# Patient Record
Sex: FEMALE | Race: WHITE | NOT HISPANIC OR LATINO | Employment: OTHER | URBAN - METROPOLITAN AREA
[De-identification: names, ages, dates, MRNs, and addresses within clinical notes are randomized per-mention and may not be internally consistent; named-entity substitution may affect disease eponyms.]

---

## 2023-02-23 VITALS
TEMPERATURE: 98.8 F | RESPIRATION RATE: 16 BRPM | SYSTOLIC BLOOD PRESSURE: 199 MMHG | DIASTOLIC BLOOD PRESSURE: 80 MMHG | OXYGEN SATURATION: 96 % | HEART RATE: 64 BPM

## 2023-05-12 ENCOUNTER — HOSPITAL ENCOUNTER (OUTPATIENT)
Dept: DATA CONVERSION | Facility: HOSPITAL | Age: 88
End: 2023-05-20
Attending: PHYSICAL MEDICINE & REHABILITATION
Payer: MEDICARE

## 2023-05-13 LAB
ANION GAP IN SER/PLAS: 13 MMOL/L (ref 10–20)
CALCIUM (MG/DL) IN SER/PLAS: 9 MG/DL (ref 8.6–10.3)
CARBON DIOXIDE, TOTAL (MMOL/L) IN SER/PLAS: 21 MMOL/L (ref 21–32)
CHLORIDE (MMOL/L) IN SER/PLAS: 105 MMOL/L (ref 98–107)
CREATININE (MG/DL) IN SER/PLAS: 0.74 MG/DL (ref 0.5–1.05)
ERYTHROCYTE DISTRIBUTION WIDTH (RATIO) BY AUTOMATED COUNT: 13.2 % (ref 11.5–14.5)
ERYTHROCYTE MEAN CORPUSCULAR HEMOGLOBIN CONCENTRATION (G/DL) BY AUTOMATED: 33.7 G/DL (ref 32–36)
ERYTHROCYTE MEAN CORPUSCULAR VOLUME (FL) BY AUTOMATED COUNT: 95 FL (ref 80–100)
ERYTHROCYTES (10*6/UL) IN BLOOD BY AUTOMATED COUNT: 4.04 X10E12/L (ref 4–5.2)
GFR FEMALE: 75 ML/MIN/1.73M2
GLUCOSE (MG/DL) IN SER/PLAS: 174 MG/DL (ref 74–99)
HEMATOCRIT (%) IN BLOOD BY AUTOMATED COUNT: 38.3 % (ref 36–46)
HEMOGLOBIN (G/DL) IN BLOOD: 12.9 G/DL (ref 12–16)
LEUKOCYTES (10*3/UL) IN BLOOD BY AUTOMATED COUNT: 9.9 X10E9/L (ref 4.4–11.3)
PLATELETS (10*3/UL) IN BLOOD AUTOMATED COUNT: 227 X10E9/L (ref 150–450)
POTASSIUM (MMOL/L) IN SER/PLAS: 4.4 MMOL/L (ref 3.5–5.3)
SODIUM (MMOL/L) IN SER/PLAS: 135 MMOL/L (ref 136–145)
UREA NITROGEN (MG/DL) IN SER/PLAS: 16 MG/DL (ref 6–23)

## 2023-05-17 LAB
ANION GAP IN SER/PLAS: 12 MMOL/L (ref 10–20)
CALCIUM (MG/DL) IN SER/PLAS: 8.8 MG/DL (ref 8.6–10.3)
CARBON DIOXIDE, TOTAL (MMOL/L) IN SER/PLAS: 24 MMOL/L (ref 21–32)
CHLORIDE (MMOL/L) IN SER/PLAS: 107 MMOL/L (ref 98–107)
CREATININE (MG/DL) IN SER/PLAS: 0.63 MG/DL (ref 0.5–1.05)
ERYTHROCYTE DISTRIBUTION WIDTH (RATIO) BY AUTOMATED COUNT: 13.2 % (ref 11.5–14.5)
ERYTHROCYTE MEAN CORPUSCULAR HEMOGLOBIN CONCENTRATION (G/DL) BY AUTOMATED: 33.7 G/DL (ref 32–36)
ERYTHROCYTE MEAN CORPUSCULAR VOLUME (FL) BY AUTOMATED COUNT: 94 FL (ref 80–100)
ERYTHROCYTES (10*6/UL) IN BLOOD BY AUTOMATED COUNT: 3.64 X10E12/L (ref 4–5.2)
GFR FEMALE: 82 ML/MIN/1.73M2
GLUCOSE (MG/DL) IN SER/PLAS: 221 MG/DL (ref 74–99)
HEMATOCRIT (%) IN BLOOD BY AUTOMATED COUNT: 34.1 % (ref 36–46)
HEMOGLOBIN (G/DL) IN BLOOD: 11.5 G/DL (ref 12–16)
LEUKOCYTES (10*3/UL) IN BLOOD BY AUTOMATED COUNT: 7.3 X10E9/L (ref 4.4–11.3)
PLATELETS (10*3/UL) IN BLOOD AUTOMATED COUNT: 262 X10E9/L (ref 150–450)
POTASSIUM (MMOL/L) IN SER/PLAS: 4 MMOL/L (ref 3.5–5.3)
SODIUM (MMOL/L) IN SER/PLAS: 139 MMOL/L (ref 136–145)
UREA NITROGEN (MG/DL) IN SER/PLAS: 13 MG/DL (ref 6–23)

## 2023-05-18 LAB
ANION GAP IN SER/PLAS: NORMAL
CALCIUM (MG/DL) IN SER/PLAS: NORMAL
CARBON DIOXIDE, TOTAL (MMOL/L) IN SER/PLAS: NORMAL
CHLORIDE (MMOL/L) IN SER/PLAS: NORMAL
CREATININE (MG/DL) IN SER/PLAS: NORMAL
ERYTHROCYTE DISTRIBUTION WIDTH (RATIO) BY AUTOMATED COUNT: NORMAL
ERYTHROCYTE MEAN CORPUSCULAR HEMOGLOBIN CONCENTRATION (G/DL) BY AUTOMATED: NORMAL
ERYTHROCYTE MEAN CORPUSCULAR VOLUME (FL) BY AUTOMATED COUNT: NORMAL
ERYTHROCYTES (10*6/UL) IN BLOOD BY AUTOMATED COUNT: NORMAL
GFR FEMALE: NORMAL
GFR MALE: NORMAL
GLUCOSE (MG/DL) IN SER/PLAS: NORMAL
HEMATOCRIT (%) IN BLOOD BY AUTOMATED COUNT: NORMAL
HEMOGLOBIN (G/DL) IN BLOOD: NORMAL
LEUKOCYTES (10*3/UL) IN BLOOD BY AUTOMATED COUNT: NORMAL
NRBC (PER 100 WBCS) BY AUTOMATED COUNT: NORMAL
PLATELETS (10*3/UL) IN BLOOD AUTOMATED COUNT: NORMAL
POTASSIUM (MMOL/L) IN SER/PLAS: NORMAL
SODIUM (MMOL/L) IN SER/PLAS: NORMAL
UREA NITROGEN (MG/DL) IN SER/PLAS: NORMAL

## 2023-06-24 ENCOUNTER — NURSING HOME VISIT (OUTPATIENT)
Dept: POST ACUTE CARE | Facility: EXTERNAL LOCATION | Age: 88
End: 2023-06-24
Payer: MEDICARE

## 2023-06-24 DIAGNOSIS — I63.9 ACUTE CEREBROVASCULAR ACCIDENT (MULTI): Primary | ICD-10-CM

## 2023-06-24 DIAGNOSIS — E78.2 DM TYPE 2 WITH DIABETIC MIXED HYPERLIPIDEMIA (MULTI): ICD-10-CM

## 2023-06-24 DIAGNOSIS — R42 VERTIGO: ICD-10-CM

## 2023-06-24 DIAGNOSIS — E11.49 DIABETES MELLITUS TYPE 2 WITH NEUROLOGICAL MANIFESTATIONS (MULTI): ICD-10-CM

## 2023-06-24 DIAGNOSIS — I10 DIABETES MELLITUS WITH COINCIDENT HYPERTENSION (MULTI): ICD-10-CM

## 2023-06-24 DIAGNOSIS — M54.10 RADICULOPATHY, UNSPECIFIED SPINAL REGION: ICD-10-CM

## 2023-06-24 DIAGNOSIS — F32.0 CURRENT MILD EPISODE OF MAJOR DEPRESSIVE DISORDER, UNSPECIFIED WHETHER RECURRENT (CMS-HCC): ICD-10-CM

## 2023-06-24 DIAGNOSIS — E11.69 DM TYPE 2 WITH DIABETIC MIXED HYPERLIPIDEMIA (MULTI): ICD-10-CM

## 2023-06-24 DIAGNOSIS — E11.9 DIABETES MELLITUS WITH COINCIDENT HYPERTENSION (MULTI): ICD-10-CM

## 2023-06-24 DIAGNOSIS — N18.30 STAGE 3 CHRONIC KIDNEY DISEASE, UNSPECIFIED WHETHER STAGE 3A OR 3B CKD (MULTI): ICD-10-CM

## 2023-06-24 DIAGNOSIS — E03.9 ACQUIRED HYPOTHYROIDISM: ICD-10-CM

## 2023-06-24 PROBLEM — F41.8 DEPRESSION WITH ANXIETY: Status: ACTIVE | Noted: 2023-06-24

## 2023-06-24 PROBLEM — I15.2 HYPERTENSION ASSOCIATED WITH DIABETES (MULTI): Status: ACTIVE | Noted: 2023-06-24

## 2023-06-24 PROBLEM — E04.2 MULTINODULAR GOITER: Status: ACTIVE | Noted: 2023-06-24

## 2023-06-24 PROBLEM — M10.9 GOUT: Status: ACTIVE | Noted: 2023-06-24

## 2023-06-24 PROBLEM — E11.59 HYPERTENSION ASSOCIATED WITH DIABETES (MULTI): Status: ACTIVE | Noted: 2023-06-24

## 2023-06-24 PROBLEM — M19.90 ARTHRITIS: Status: ACTIVE | Noted: 2023-06-24

## 2023-06-24 PROBLEM — E11.40 DIABETIC NEUROPATHY (MULTI): Status: ACTIVE | Noted: 2023-06-24

## 2023-06-24 PROBLEM — Z95.0 PACEMAKER: Status: ACTIVE | Noted: 2023-06-24

## 2023-06-24 PROBLEM — F41.9 ANXIETY: Status: ACTIVE | Noted: 2023-06-24

## 2023-06-24 PROBLEM — M48.062 SPINAL STENOSIS, LUMBAR REGION, WITH NEUROGENIC CLAUDICATION: Status: ACTIVE | Noted: 2023-06-24

## 2023-06-24 PROBLEM — I25.10 ATHEROSCLEROSIS OF CORONARY ARTERY: Status: ACTIVE | Noted: 2023-06-24

## 2023-06-24 PROBLEM — I44.2 CHB (COMPLETE HEART BLOCK) (MULTI): Status: ACTIVE | Noted: 2023-06-24

## 2023-06-24 PROBLEM — I44.30 AV BLOCK: Status: ACTIVE | Noted: 2023-06-24

## 2023-06-24 PROCEDURE — 99306 1ST NF CARE HIGH MDM 50: CPT | Performed by: INTERNAL MEDICINE

## 2023-06-24 NOTE — PROGRESS NOTES
Subjective   Patient ID: Toshia Méndez is a 89 y.o. female who presents for postop dizziness vertigo lower extremity weakness moderate pain decreased ADL mobility ambulatory crisis  Assessment/Plan     Problem List Items Addressed This Visit    None      HPI89 year old female admitted to [facility] on 6/15 for rehab. Had a CVA on 5/7, received TNK with a subsequent hemorrhagic conversion. Was discharged to rehab and returned home. After returning home, presented to [facility] ED on 5/27 with stroke like symptoms. Was admitted and again discharged. Again presented to [facility] ED on 6/6 with dizziness, increased LLE weakness, and SOB. Thought to be post CVA related vertigo. Also developed BLE numbness L>R, most consistent with compressive mononeuropathy or radiculopathy. Did have a MRI on day of discharge.     PMHx: Iron deficiency anemia, CVA with post hemorrhagic conversion (LLE weakness), TIA, DM II, PUD, CAD, HTN, anxiety, DDD, CKD 3a, DVT, HLD, hypothyroidism, OA, AV block due to AV ablation, EF 70-75%, goiter     PSHx: PCI with stent, CABG, lumbar surgery, miguel, tonsillectomy, D&C, pacer, cataract extraction, meniscectomy of R knee, dilatation of esophageal narrowing      Social Hx: , lives alone. Denies smoking, ETOH.  6/22: BUN 10 Cr 0.6 GFR >60 Mg 1.5 gluc 192 Hg 12.2 Hct 35.4 WBC 4.7   6/16: Na-136 Cl-103 K-4.4 Glucose-193 BUN-13 Creat-0.6 Phos-4 Mag-1.4 H&H-11.7/33.8 WBC-5.1 Platelets-213    Not on File    No current outpatient medications on file.     No current facility-administered medications for this visit.      Order Order Status Revised Last Ordered    Bisacodyl Suppository 10 MG Active 6/15/2023     MIRALAX (Polyetheylene glycol) LAXATIVE POWDER Active 6/15/2023     Tuberculin PPD Solution 5 UNIT/0.1ML Active 6/15/2023   This order is potentially duplicated by other orders on the chart. Click to view details.    Multivitamin Adult Oral Tablet (Multiple Vitamin) Active 6/16/2023      Antifungal External Powder 2 % (Miconazole Nitrate (Topical)) Active 6/15/2023     Atorvastatin Calcium Oral Tablet 80 MG (Atorvastatin Calcium) Active 6/16/2023 6/15/2023    Biotin Oral Tablet 5 MG (Biotin) Active 6/16/2023   This order is potentially duplicated by other orders on the chart. Click to view details.    Metoprolol Tartrate Oral Tablet 25 MG (Metoprolol Tartrate) Active 6/15/2023 6/15/2023  This order is potentially duplicated by other orders on the chart. Click to view details.    Losartan Potassium Oral Tablet 100 MG (Losartan Potassium) Active 6/16/2023 6/15/2023    Aspirin Oral Tablet (Aspirin) Active 6/16/2023   This order is potentially duplicated by other orders on the chart. Click to view details.    amLODIPine Besylate Oral Tablet 10 MG (Amlodipine Besylate) Active 6/16/2023 6/15/2023  This order is potentially duplicated by other orders on the chart. Click to view details.    Vitamin C Oral Tablet 500 MG (Ascorbic Acid) Active 6/16/2023     Fish Oil Oral Capsule 1200 MG (Omega-3 Fatty Acids) Active 6/16/2023     Omeprazole 20 MG Capsule delayed release Active 6/16/2023 6/15/2023  This order is potentially duplicated by other orders on the chart. Click to view details.    chlorproMAZINE HCl Oral Tablet 10 MG (Chlorpromazine HCl) Active 6/15/2023 6/15/2023    Folic Acid Oral Tablet 1 MG (Folic Acid) Active 6/16/2023     Levothyroxine Sodium Tablet 137 MCG Active 6/16/2023 6/15/2023    Nitroglycerin Tablet Sublingual 0.4 MG Active 6/15/2023 6/15/2023  This order is potentially duplicated by other orders on the chart. Click to view details.    Furosemide Tablet 20 MG Active 6/15/2023 6/15/2023  This order is potentially duplicated by other orders on the chart. Click to view details.    Milk of Magnesia Suspension 400 MG/5ML (Magnesium Hydroxide) Active 6/15/2023     Acetaminophen Tablet 325 MG Active 6/15/2023     Glucagon Emergency Active 6/15/2023   This order is potentially duplicated by other  orders on the chart. Click to view details.    Trifluoperazine HCl Oral Tablet 2 MG (Trifluoperazine HCl) Active 6/17/2023 6/16/2023    ALPRAZolam Oral Tablet 0.25 MG (Alprazolam) Active 6/17/2023 6/16/2023    HumaLOG KwikPen 100 UNIT/ML Solution pen-injector Active 6/19/2023 6/17/2023    Glimepiride Oral Tablet 4 MG (Glimepiride) Active 6/20/2023 6/20/2023  This order is potentially duplicated by other orders on the chart. Click to view details.    Lasix Oral Tablet 20 MG (Furosemide) Active 6/21/2023 6/20/2023  This order is potentially duplicated by other orders on the chart. Click to view details.    MagOx 400 Oral Tablet (Magnesium Oxide (Mg Supplement)) Active 6/22/2023 6/22/2023    Jardiance Oral Tablet 10 MG (Empagliflozin) Active 6/24/2023 6/23/2023  Objective   There were no vitals taken for this visit.  Physical Exam  Current Vitals   BP: 130/70 mmHg  6/23/2023 11:10    Temp:97.6 °F  6/23/2023 11:10  Pulse:78 bpm  6/23/2023 11:10  Weight:156.3 Lbs  6/19/2023 15:09  Resp:18 Breaths/min  6/23/2023 11:10  BS:255 mg/dL  6/24/2023 08:45  O2:97 %  6/23/2023 11:10  Pain:0  6/24/2023 08:50  Constitutional:       General: He is not in acute distress.     Appearance: Normal appearance.   HENT:   Carotid bruit  Eyes:      Extraocular Movements: Extraocular movements intact.      Conjunctiva/sclera: Conjunctivae normal.   Cardiovascular:   Heart murmur irregular  Pulmonary:     Crackle Skin:     General: Skin is warm.   Neurological: Poststroke sequela with the weakness tingling numbness upper lower extremity more on the left compared to right    Psychiatric:         Anxiety depression  Musculoskeletal arthritis    Review of Systems    Orders Only on 05/17/2023   Component Date Value Ref Range Status    WBC 05/18/2023 CANCELED   Final-Edited    nRBC 05/18/2023 CANCELED   Final-Edited    RBC 05/18/2023 CANCELED   Final-Edited    Hemoglobin 05/18/2023 CANCELED   Final-Edited    Hematocrit 05/18/2023 CANCELED    Final-Edited    MCV 05/18/2023 CANCELED   Final-Edited    MCHC 05/18/2023 CANCELED   Final-Edited    Platelets 05/18/2023 CANCELED   Final-Edited    RDW 05/18/2023 CANCELED   Final-Edited   Orders Only on 05/17/2023   Component Date Value Ref Range Status    Glucose 05/18/2023 CANCELED   Final-Edited    Sodium 05/18/2023 CANCELED   Final-Edited    Potassium 05/18/2023 CANCELED   Final-Edited    Chloride 05/18/2023 CANCELED   Final-Edited    Bicarbonate 05/18/2023 CANCELED   Final-Edited    Anion Gap 05/18/2023 CANCELED   Final-Edited    Urea Nitrogen 05/18/2023 CANCELED   Final-Edited    Creatinine 05/18/2023 CANCELED   Final-Edited    GFR Female 05/18/2023 CANCELED   Final-Edited    GFR MALE 05/18/2023 CANCELED   Final-Edited    Calcium 05/18/2023 CANCELED   Final-Edited   Orders Only on 05/17/2023   Component Date Value Ref Range Status    WBC 05/17/2023 7.3  4.4 - 11.3 x10E9/L Final    RBC 05/17/2023 3.64 (L)  4.00 - 5.20 x10E12/L Final    Hemoglobin 05/17/2023 11.5 (L)  12.0 - 16.0 g/dL Final    Hematocrit 05/17/2023 34.1 (L)  36.0 - 46.0 % Final    MCV 05/17/2023 94  80 - 100 fL Final    MCHC 05/17/2023 33.7  32.0 - 36.0 g/dL Final    Platelets 05/17/2023 262  150 - 450 x10E9/L Final    RDW 05/17/2023 13.2  11.5 - 14.5 % Final   Orders Only on 05/17/2023   Component Date Value Ref Range Status    Glucose 05/17/2023 221 (H)  74 - 99 mg/dL Final    Sodium 05/17/2023 139  136 - 145 mmol/L Final    Potassium 05/17/2023 4.0  3.5 - 5.3 mmol/L Final    Chloride 05/17/2023 107  98 - 107 mmol/L Final    Bicarbonate 05/17/2023 24  21 - 32 mmol/L Final    Anion Gap 05/17/2023 12  10 - 20 mmol/L Final    Urea Nitrogen 05/17/2023 13  6 - 23 mg/dL Final    Creatinine 05/17/2023 0.63  0.50 - 1.05 mg/dL Final    GFR Female 05/17/2023 82  >90 mL/min/1.73m2 Final    Calcium 05/17/2023 8.8  8.6 - 10.3 mg/dL Final   Orders Only on 05/12/2023   Component Date Value Ref Range Status    WBC 05/13/2023 9.9  4.4 - 11.3 x10E9/L Final     RBC 05/13/2023 4.04  4.00 - 5.20 x10E12/L Final    Hemoglobin 05/13/2023 12.9  12.0 - 16.0 g/dL Final    Hematocrit 05/13/2023 38.3  36.0 - 46.0 % Final    MCV 05/13/2023 95  80 - 100 fL Final    MCHC 05/13/2023 33.7  32.0 - 36.0 g/dL Final    Platelets 05/13/2023 227  150 - 450 x10E9/L Final    RDW 05/13/2023 13.2  11.5 - 14.5 % Final   Orders Only on 05/12/2023   Component Date Value Ref Range Status    Glucose 05/13/2023 174 (H)  74 - 99 mg/dL Final    Sodium 05/13/2023 135 (L)  136 - 145 mmol/L Final    Potassium 05/13/2023 4.4  3.5 - 5.3 mmol/L Final    Chloride 05/13/2023 105  98 - 107 mmol/L Final    Bicarbonate 05/13/2023 21  21 - 32 mmol/L Final    Anion Gap 05/13/2023 13  10 - 20 mmol/L Final    Urea Nitrogen 05/13/2023 16  6 - 23 mg/dL Final    Creatinine 05/13/2023 0.74  0.50 - 1.05 mg/dL Final    GFR Female 05/13/2023 75  >90 mL/min/1.73m2 Final    Calcium 05/13/2023 9.0  8.6 - 10.3 mg/dL Final       Radiology: Reviewed imaging in powerchart.  Electrocardiogram 12 Lead    Result Date: 6/24/2023  Sinus rhythm with 1st degree AV block Left bundle branch block Abnormal ECG When compared with ECG of 26-MAY-2023 17:39, No significant change was found Confirmed by Leo Hamlin ([id number]) on 6/24/2023 1:08:08 AM    XR foot    Result Date: 6/12/2023  Interpreted By:  ESTRELLITA HENRY MD MRN: [id number] Patient Name: ESA MCNALLY  STUDY: FOOT, 2 VIEWS;  6/12/2023 4:35 pm  INDICATION: pain .  COMPARISON: None.  ACCESSION NUMBER(S): [id number]  ORDERING CLINICIAN: BRYNN CABRERA  FINDINGS: Bony structures:  Intact. Plantar and posterior calcaneal enthesophytes are noted.  Joint spaces:  Marked hallux valgus angulation and severe hammertoe deformities. Is mild dorsal osteophytosis at the tarsus.  Soft tissues:  Unremarkable without significant edema or radiodense foreign body  Other:  None significant      No acute findings.    CT brain attack head wo IV contrast    Result Date: 6/11/2023  Interpreted By:  BOOM  MD MARILU MRN: [id number] Patient Name: ESA MCNALLY  STUDY: NR CT BRAIN ATTACK HEAD WO CONTRAST;  6/11/2023 2:48 am  INDICATION: left leg numbness .  COMPARISON: 6/8/2023  ACCESSION NUMBER(S): [id number]  ORDERING CLINICIAN: AHMET ADAMSON  TECHNIQUE: Contiguous axial images of the head were obtained without intravenous contrast.  FINDINGS: There is stable edema in the right parietal and occipital lobe compatible with patient's known recent bone infarct. There is mild petechial hemorrhage or laminar necrosis which is stable in comparison to prior examination. No hydrocephalus. No midline shift. The basal cisterns are preserved. The paranasal sinuses are clear and well pneumatized.      Stable edema in the right parietal and occipital lobe corresponding to patient's known recent infarct. Mild hyperdensity at site of infarct is also stable and compatible with petechial hemorrhage or cortical laminar necrosis.      CT head wo IV contrast    Result Date: 6/9/2023  Interpreted By:  JEANMARIE MONROE MD, PHD MRN: [id number] Patient Name: ESA MCNALLY  STUDY: CT HEAD WO CONTRAST;  6/8/2023 11:46 pm  INDICATION: follow up petechial hemorrhage .  COMPARISON: 06/08/2023 approximately 4:00 p.m.  ACCESSION NUMBER(S): [id number]  ORDERING CLINICIAN: WILY SALOMON  TECHNIQUE: Noncontrast axial CT scan of head was performed 06/08/2023 at approximately 11:45 p.m.. Angled reformats in brain and bone windows were generated. The images were reviewed in bone, brain, blood and soft tissue windows.  FINDINGS: CSF Spaces: There remains sulcal effacement in right occipital and posterior medial parietal distribution. The ventricles, other sulci and basal cisterns are within normal limits. There is no extraaxial fluid collection.  Parenchyma: There remains involving hypodensity in the right superior occipital and predominantly posteromedial parietal distribution. The grey-white differentiation is otherwise intact. There is no  greater mass effect or midline shift. There remains some subtle hyperdensity on adjacent gyri without interval progression. There is no hematoma..  Calvarium: The calvarium remains within normal limits..  Paranasal sinuses and mastoids: Visualized paranasal sinuses and mastoids are clear.      Stable exam with of all vein nonhemorrhagic infarct principally in right posteromedial parietal distribution with some extension to superior occipital. There is an adjacent sulcal effacement and no hematoma.  Interpreted within [place], [place]    Venous Duplex Ultrasound DVT    Result Date: 6/8/2023  [facility] [address] [address] [place] [address] Tel [phone] Fax [phone] Vascular Lab Report Lower Venous Duplex Ultrasound Patient Name:     ESA MCNALLY Reading Physician:   [id number] Annel Hernandez MD, RPVI Study Date:       6/8/2023             Referring Physician: TALHA OVIEDO MRN/PID:          [id number]             PCP: Accession/Order#: [id number]            CC Report to: YOB: 1929            Technologist:        Renetta Wilder RVT Gender:           F                    Technologist 2: Admission Status: Inpatient            Location Performed:  [facility] Diagnosis/ICD: M79.89-Left leg swelling; M79.89-Right leg swelling Procedure/CPT: 96093 Peripheral venous duplex scan for DVT complete-[id number] Pertinent History: HTN, CAD and CVA. CONCLUSIONS: Right Lower Venous: No evidence of acute deep vein thrombus visualized in the right lower extremity. Left Lower Venous: No evidence of acute deep vein thrombus visualized in the left lower extremity. Comparison: Compared with study from 5/8/2023, no significant change.Negative for deep venous thrombus in bilateral lower extremities. Imaging & Doppler Findings: Right                 Compressible Thrombus        Flow Distal External Iliac     Yes        None   Spontaneous/Phasic CFV                       Yes         None   Spontaneous/Phasic PFV                       Yes        None FV Proximal               Yes        None   Spontaneous/Phasic FV Mid                    Yes        None FV Distal                 Yes        None Popliteal                 Yes        None   Spontaneous/Phasic Peroneal                  Yes        None PTV                       Yes        None Left                  Compress Thrombus        Flow Distal External Iliac   Yes      None   Spontaneous/Phasic CFV                     Yes      None   Spontaneous/Phasic PFV                     Yes      None FV Proximal             Yes      None   Spontaneous/Phasic FV Mid                  Yes      None FV Distal               Yes      None Popliteal               Yes      None   Spontaneous/Phasic Peroneal                Yes      None PTV                     Yes      None [id number] Annel Hernandez MD, RPVI Electronically signed by [id number] Annel Hernandez MD, RPVI on 6/8/2023 at 5:11:09    CT brain attack head wo IV contrast    Result Date: 6/8/2023  Interpreted By:  JESSE FISHER MD MRN: [id number] Patient Name: ESA MCNALLY  STUDY: NR CT BRAIN ATTACK HEAD WO CONTRAST;  6/8/2023 3:59 pm  INDICATION: dizziness and headache .  COMPARISON: Previous exam is from 06/08/2023 at 7:49 a.m...  ACCESSION NUMBER(S): [id number]  ORDERING CLINICIAN: BRYNN CABRERA  TECHNIQUE: Routine axial images were obtained from the skull base through the vertex.  Sagittal and coronal reconstruction images were generated. Brain, subdural, and bone windows were reviewed.  FINDINGS: INTRACRANIAL: Mild prominence of ventricles and sulci. There is mild patchy hypodensity throughout the deep periventricular white matter. There is an acute hypodense infarct in the posterior-medial right parietal-occipital region. There several foci of faint petechial hemorrhage along the periphery of the infarct, not significantly changed from the most recent prior exam. There is no new acute infarct and there is  no new acute intracranial bleed. There is stable effacement of adjacent sulci and gyri. No discernible or measurable midline shift. There is slight mass effect upon the right trigone. No destructive bone lesion. No depressed skull fracture. Skullbase arterial calcifications in the carotid siphons and vertebral arteries.  EXTRACRANIAL: Visualized paranasal sinuses were clear. Visualized mastoid air cells were clear.      Grossly stable acute right PCA distribution infarct with subtle small foci peripheral petechial hemorrhage, not significantly changed.  Mild volume loss.  Mild chronic white matter ischemic disease in the deep periventricular regions.   Document Only: The critical information above was relayed directly by me by Doc Halo to BRYNN CABRERA on 6/8/2023 at 4:10 pm  .    CT head wo IV contrast    Result Date: 6/8/2023  Interpreted By:  ALFIE BOURNE MD, PHD MRN: [id number] Patient Name: ESA MCNALLY  STUDY: CT HEAD WO CONTRAST;  6/8/2023 7:57 am  INDICATION: follow up vertigo; can't have MRI 2/2 PPM .  COMPARISON: Head CT, 06/06/2023 and 05/28/2020  ACCESSION NUMBER(S): [id number]  ORDERING CLINICIAN: WILY SALOMON  TECHNIQUE: Noncontrast axial CT scan of the head was performed. Angled reformats in brain and bone windows were generated. The images were reviewed in bone, brain, blood and soft tissue windows.  FINDINGS: Loss of gray-white differentiation and decreased attenuation in the left parietal and occipital lobes with associated sulcal effacement, similar to previous. An area of cortical hyperdensity at the anterior margin of the infarct (sagittal image 41) is more conspicuous than on the prior study and consistent with petechial hemorrhage. No new intracranial hemorrhage or loss of gray-white differentiation. Unchanged effacement of the atrium and occipital horn of the right lateral ventricle. No midline shift or herniation. The basal cisterns are patent.  Nonspecific low attenuation in the  white matter is similar to previous and likely secondary to chronic small vessel ischemic disease. Ventricular size is otherwise within normal limits. Bilateral lens replacements are again noted. The calvarium is unremarkable. The visualized portions of the paranasal sinuses and mastoid air cells are clear.      Evolving right PCA territory infarct with increased conspicuity of the associated petechial hemorrhage, mass effect is unchanged.    NM lung perfusion particulate    Result Date: 6/7/2023  Interpreted By:  ISADORA SAEZ MD MRN: [id number] Patient Name: ESA MCNALLY  STUDY: PERFUSION LUNG;  6/7/2023 12:06 pm  INDICATION: SOB, elevated d dimer .  COMPARISON: Chest x-ray from 06/06/2023.  ACCESSION NUMBER(S): [id number]  ORDERING CLINICIAN: KARLI MALIN  TECHNIQUE: DIVISION OF NUCLEAR MEDICINE PERFUSION LUNG SCANS  Multiple perfusion images of the lungs were acquired after the intravenous administration of 4.4 mCi of Tc-99m macroaggregated albumin (MAA). In addition, SPECT/CT of the chest was performed.  FINDINGS: Planar perfusion images of both lungs demonstrate mild heterogeneity throughout the lung fields bilaterally. No distinct wedge-shaped subsegmental or segmental perfusion defect seen on SPECT/CT to suggest acute pulmonary embolism..      1. No distinct wedge-shaped subsegmental or segmental perfusion defect seen on SPECT/CT to suggest acute pulmonary embolism (low probability).  The interpretation above is based on modified PIOPED II and PISAPED criteria.  This study was analyzed and interpreted at [place], [place].    CT head wo IV contrast    Result Date: 6/6/2023  Interpreted By:  ARCELIA BARRON MD MRN: [id number] Patient Name: ESA MCNALLY  STUDY: CT HEAD WO CONTRAST;  6/6/2023 6:36 pm  INDICATION: recent stroke hx, weak today, sob, headache  COMPARISON: 05/28/2023  ACCESSION NUMBER(S): [id number]  ORDERING CLINICIAN: JUVENCIO HERNADEZ  TECHNIQUE: Axial noncontrast CT images  of head with coronal and sagittal reconstructed images.  FINDINGS: CT HEAD:  Evidence of right parietal infarct not measurably changed from most recent examination with evolving ischemic changes. No evidence of acute hemorrhage. No mass effect or midline shift. No overt stigmata of new infarction  Vascular calcifications are seen.  If persistent concern, consider further evaluation with MRI mastoid air cells and paranasal sinuses otherwise clear      CT HEAD: Unchanged right parietal subacute infarction. No evidence of hemorrhagic conversion. If persistent concern, further evaluation can be considered with MRI . Stable examination when compared to prior    XR chest 1 view    Result Date: 6/6/2023  Interpreted By:  HAMZAH JHA MD MRN: [id number] Patient Name: ESA MCNALLY  STUDY: CHEST 1 VIEW;  6/6/2023 5:42 pm  INDICATION: Chest Pain .  COMPARISON: Chest radiograph 05/26/2020  ACCESSION NUMBER(S): [id number]  ORDERING CLINICIAN: JUVENCIO HERNADEZ  FINDINGS:   CARDIOMEDIASTINAL SILHOUETTE: Cardiomediastinal silhouette is stable in size and configuration. Left-sided pulse generator device.  LUNGS: No consolidation, pneumothorax, or significant effusion. Similar appearing presumed chronic reticular opacities bilaterally.  ABDOMEN: No remarkable upper abdominal findings.  BONES: No acute osseous changes.      1.  No evidence of acute cardiopulmonary process.      Electrocardiogram 12 Lead    Result Date: 5/31/2023  Sinus rhythm with 1st degree AV block Rightward axis Left bundle branch block Abnormal ECG When compared with ECG of 08-MAY-2023 09:39, No significant change was found Confirmed by Elvin Abdullahi ([id number]) on 5/31/2023 9:46:28 AM    CT head wo IV contrast    Result Date: 5/28/2023  Interpreted By:  MAGY OROZCO MD MRN: [id number] Patient Name: IRVING MCNALLYUERITE  STUDY: CT HEAD WO CONTRAST;  5/28/2023 8:15 am  INDICATION: leg weakness .  COMPARISON: 05/27/2023.  ACCESSION NUMBER(S): [id number]  ORDERING  CLINICIAN: ARCELIA CAMPBELL  TECHNIQUE: Contiguous unenhanced axial images were obtained through the brain.  FINDINGS: INTRACRANIAL: There is redemonstration of an evolving subacute infarct in the right parietal lobe posteriorly near midline with wedge-shaped region of low-attenuation parenchymal edema similar to prior. No new intracranial bleed is seen. There is regional sulcal effacement. No midline shift. No extra-axial fluid collection or hydrocephalus is seen. Irregular atherosclerotic calcifications again demonstrated in the internal carotid artery and vertebral artery segments.  Bones are intact.  EXTRACRANIAL: Visualized paranasal sinuses and mastoids are clear.      Evolving subacute infarct of the right parietal lobe posteriorly near midline similar to prior. No new intracranial bleed.      CT cervical spine wo IV contrast    Result Date: 5/27/2023  Interpreted By:  JESSE FISHER MD MRN: [id number] Patient Name: ESA MCNALLY  STUDY: CT C-SPINE WO CONTRAST;  5/27/2023 9:34 am  INDICATION: S/P recent stroke with hemorrhagic conversion, now presenting w with bilateral LE weakness .  COMPARISON: None.  ACCESSION NUMBER(S): [id number]  ORDERING CLINICIAN: BENITA RESENDEZ  TECHNIQUE: Thin section axial images were obtained from the skull base down through the thoracic inlet. Sagittal and coronal reconstruction images were generated. Soft tissue, lung, and bone windows were reviewed.  FINDINGS: VERTEBRAL BODIES AND POSTERIOR ELEMENTS: There is slight disc space narrowing at C5-6. Mild-to-moderate endplate osteophytosis at C5-6. Very mild endplate spurring at C2-3, C4-5 and C6-7. Mild anterior spurring also at C7-T1 and T1-2 with disc space height preservation. There is joint space loss with spurring in the anterior superior aspect of the atlantoaxial joint. Mild calcification of the transverse ligament posterior to the dens. Slight posterior disc bulge at C5-6. Multilevel interfacet hypertrophy with spur  formation. Uncovertebral hypertrophy with spur formation bilaterally at C5-6 and C6-7 and on the left at C4-5. No cervical spine compression fracture. No posterior element fracture.  No destructive bone lesion. No listhesis.  SPINAL CANAL: No gross disc herniation.  NECK SOFT TISSUES: Within normal limits.  LUNG APICES: Mild pleural and parenchymal scarring in the posterolateral right apex. Left lung apex was clear..  SKULL BASE: Within normal limits.      Cervical spine DJD as described. No CT evidence of cervical spine fracture in this exam.    CT head wo IV contrast    Result Date: 5/27/2023  Interpreted By:  JESSE FISHER MD MRN: [id number] Patient Name: ESA MCNALLY  STUDY: CT HEAD WO CONTRAST;  5/27/2023 9:33 am  INDICATION: S/P recent stroke with hemorrhagic conversion, now presentingw with bilateral LE weakness .  COMPARISON: Previous exam is from 05/26/2023..  ACCESSION NUMBER(S): [id number]  ORDERING CLINICIAN: BENITA RESENDEZ  TECHNIQUE: Routine axial images were obtained from the skull base through the vertex.  Sagittal and coronal reconstruction images were generated. Brain, subdural, and bone windows were reviewed.  FINDINGS: INTRACRANIAL: Mild prominence of ventricles and sulci. There is mild patchy hypodensity throughout the deep periventricular white matter. There is a subacute large posterior midline right parietal infarct. There is slight further decrease in the previous hyperdense components. No significant change in size. No new infarct. No new  acute intracranial bleed.  No destructive bone lesion. No depressed skull fracture. Skullbase arterial calcifications in the carotid siphons and vertebral arteries.  EXTRACRANIAL: Visualized paranasal sinuses were clear. Visualized mastoid air cells were clear.      Large subacute posterior midline right parietal lobe infarct. Slight decrease in the mild hyperdense components, but no other change.  Mild underlying volume loss.  Mild underlying chronic  "white matter ischemic disease in the deep periventricular regions.    CT head wo IV contrast    Result Date: 5/26/2023  Interpreted By:  MAXIMILIAN MONTALVO MD MRN: [id number] Patient Name: ESA MCNALLY  STUDY: CT HEAD WO CONTRAST;  5/26/2023 9:53 pm  INDICATION: \"dizziness\" recent brain bleed in May .  COMPARISON: 05/10/2023  ACCESSION NUMBER(S): [id number]  ORDERING CLINICIAN: MIKE CARTER  TECHNIQUE: Axial noncontrast CT images of the head.  FINDINGS: BRAIN PARENCHYMA: Stable loss of gray-white differentiation and sulcal effacement in the right parieto-occipital lobe, consistent with subacute to chronic infarct. Previously seen associated hemorrhage has nearly completely resolved. Gray-white matter interfaces are otherwise preserved. No mass effect or midline shift. Mild deep and periventricular white matter hypodensities are nonspecific, but favored to represent chronic small vessel ischemic changes.  HEMORRHAGE: No acute intracranial hemorrhage. VENTRICLES and EXTRA-AXIAL SPACES: The ventricles and sulci are within normal limits in size for brain volume.  No abnormal extraaxial fluid collection. EXTRACRANIAL SOFT TISSUES: Within normal limits. PARANASAL SINUSES/MASTOIDS: The visualized paranasal sinuses and mastoid air cells are aerated. CALVARIUM: No depressed skull fracture. No destructive osseous lesion.  OTHER FINDINGS: None.      No acute intracranial hemorrhage or midline shift.  Subacute to chronic infarct in the right parieto-occipital lobe with nearly completely resolved associated hemorrhage.    XR chest 1 view    Result Date: 5/26/2023  Interpreted By:  PABLITO THORNTON MD MRN: [id number] Patient Name: ESA MCNALLY  STUDY: CHEST 1 VIEW;  5/26/2023 5:59 pm  INDICATION: Chest Pain .  COMPARISON: 05/09/2023  ACCESSION NUMBER(S): [id number]  ORDERING CLINICIAN: MIKE CARTER  FINDINGS: Stable patchy bilateral suprahilar opacities. No pleural effusion or pneumothorax. Normal heart size. " Sternotomy wires. Atherosclerosis. Cardiac device. No acute osseous abnormality.      Stable bilateral suprahilar opacities.      No family history on file.  Social History     Socioeconomic History    Marital status:      Spouse name: Not on file    Number of children: Not on file    Years of education: Not on file    Highest education level: Not on file   Occupational History    Not on file   Tobacco Use    Smoking status: Not on file    Smokeless tobacco: Not on file   Substance and Sexual Activity    Alcohol use: Not on file    Drug use: Not on file    Sexual activity: Not on file   Other Topics Concern    Not on file   Social History Narrative    Not on file     Social Determinants of Health     Financial Resource Strain: Not on file   Food Insecurity: Not on file   Transportation Needs: Not on file   Physical Activity: Not on file   Stress: Not on file   Social Connections: Not on file   Intimate Partner Violence: Not on file   Housing Stability: Not on file     Past Medical History:   Diagnosis Date    Abnormal weight loss     Weight loss    Anxiety disorder, unspecified     Anxiety    Cardiomegaly     LVH (left ventricular hypertrophy)    Difficulty in walking, not elsewhere classified     Difficulty walking    Disorder of arteries and arterioles, unspecified (CMS/HCC)     Carotid artery disease    Essential (primary) hypertension 11/07/2013    Benign essential hypertension    Hyperlipidemia, unspecified 11/07/2013    Hyperlipidemia    Other symptoms and signs involving the musculoskeletal system     Limb weakness    Personal history of other diseases of the circulatory system     Personal history of coronary atherosclerosis    Personal history of other diseases of the digestive system     History of gastroesophageal reflux (GERD)    Personal history of other diseases of the nervous system and sense organs     History of diplopia    Personal history of other diseases of the respiratory system      History of chronic bronchitis    Personal history of other endocrine, nutritional and metabolic disease     History of diabetes mellitus    Personal history of other mental and behavioral disorders     History of depression    Personal history of other specified conditions     History of diarrhea    Personal history of other venous thrombosis and embolism     History of deep venous thrombosis    Stress incontinence (female) (male)     Stress incontinence     Past Surgical History:   Procedure Laterality Date    CARDIAC SURGERY  11/19/2014    Heart Surgery    CHOLECYSTECTOMY  11/19/2014    Cholecystectomy    COLONOSCOPY  11/19/2014    Colonoscopy (Fiberoptic)    CORONARY ARTERY BYPASS GRAFT  02/11/2014    CABG    DILATION AND CURETTAGE OF UTERUS  02/11/2014    Dilation And Curettage    LUMBAR LAMINECTOMY  03/04/2015    Laminectomy Lumbar    MR HEAD ANGIO WO IV CONTRAST  2/18/2020    MR HEAD ANGIO WO IV CONTRAST 2/18/2020 [facility]    MR NECK ANGIO WO IV CONTRAST  2/18/2020    MR NECK ANGIO WO IV CONTRAST 2/18/2020 [facility]    OTHER SURGICAL HISTORY  10/26/2021    Bronchoscopy    OTHER SURGICAL HISTORY  10/26/2021    Arterial stent placement    OTHER SURGICAL HISTORY  10/26/2021    Back surgery    OTHER SURGICAL HISTORY  10/26/2021    Cataract surgery    OTHER SURGICAL HISTORY  10/26/2021    Coronary artery bypass graft    OTHER SURGICAL HISTORY  10/26/2021    Pacemaker insertion    OTHER SURGICAL HISTORY  10/26/2021    Esophagogastroduodenoscopy    OTHER SURGICAL HISTORY  02/11/2014    Previous Stent Placement    TONSILLECTOMY  11/19/2014    Tonsillectomy       Charting was completed using voice recognition technology and may include unintended errors.

## 2023-06-24 NOTE — LETTER
Patient: Toshia Méndez  : 1929    Encounter Date: 2023    Subjective  Patient ID: Toshia Méndez is a 89 y.o. female who presents for postop dizziness vertigo lower extremity weakness moderate pain decreased ADL mobility ambulatory crisis  Assessment/Plan    Problem List Items Addressed This Visit    None      HPI89 year old female admitted to [facility] on 6/15 for rehab. Had a CVA on , received TNK with a subsequent hemorrhagic conversion. Was discharged to rehab and returned home. After returning home, presented to [facility] ED on  with stroke like symptoms. Was admitted and again discharged. Again presented to [facility] ED on  with dizziness, increased LLE weakness, and SOB. Thought to be post CVA related vertigo. Also developed BLE numbness L>R, most consistent with compressive mononeuropathy or radiculopathy. Did have a MRI on day of discharge.     PMHx: Iron deficiency anemia, CVA with post hemorrhagic conversion (LLE weakness), TIA, DM II, PUD, CAD, HTN, anxiety, DDD, CKD 3a, DVT, HLD, hypothyroidism, OA, AV block due to AV ablation, EF 70-75%, goiter     PSHx: PCI with stent, CABG, lumbar surgery, miguel, tonsillectomy, D&C, pacer, cataract extraction, meniscectomy of R knee, dilatation of esophageal narrowing      Social Hx: , lives alone. Denies smoking, ETOH.  : BUN 10 Cr 0.6 GFR >60 Mg 1.5 gluc 192 Hg 12.2 Hct 35.4 WBC 4.7   : Na-136 Cl-103 K-4.4 Glucose-193 BUN-13 Creat-0.6 Phos-4 Mag-1.4 H&H-11.7/33.8 WBC-5.1 Platelets-213    Not on File    No current outpatient medications on file.     No current facility-administered medications for this visit.      Order Order Status Revised Last Ordered    Bisacodyl Suppository 10 MG Active 6/15/2023     MIRALAX (Polyetheylene glycol) LAXATIVE POWDER Active 6/15/2023     Tuberculin PPD Solution 5 UNIT/0.1ML Active 6/15/2023   This order is potentially duplicated by other orders on the chart. Click to view details.     Multivitamin Adult Oral Tablet (Multiple Vitamin) Active 6/16/2023     Antifungal External Powder 2 % (Miconazole Nitrate (Topical)) Active 6/15/2023     Atorvastatin Calcium Oral Tablet 80 MG (Atorvastatin Calcium) Active 6/16/2023 6/15/2023    Biotin Oral Tablet 5 MG (Biotin) Active 6/16/2023   This order is potentially duplicated by other orders on the chart. Click to view details.    Metoprolol Tartrate Oral Tablet 25 MG (Metoprolol Tartrate) Active 6/15/2023 6/15/2023  This order is potentially duplicated by other orders on the chart. Click to view details.    Losartan Potassium Oral Tablet 100 MG (Losartan Potassium) Active 6/16/2023 6/15/2023    Aspirin Oral Tablet (Aspirin) Active 6/16/2023   This order is potentially duplicated by other orders on the chart. Click to view details.    amLODIPine Besylate Oral Tablet 10 MG (Amlodipine Besylate) Active 6/16/2023 6/15/2023  This order is potentially duplicated by other orders on the chart. Click to view details.    Vitamin C Oral Tablet 500 MG (Ascorbic Acid) Active 6/16/2023     Fish Oil Oral Capsule 1200 MG (Omega-3 Fatty Acids) Active 6/16/2023     Omeprazole 20 MG Capsule delayed release Active 6/16/2023 6/15/2023  This order is potentially duplicated by other orders on the chart. Click to view details.    chlorproMAZINE HCl Oral Tablet 10 MG (Chlorpromazine HCl) Active 6/15/2023 6/15/2023    Folic Acid Oral Tablet 1 MG (Folic Acid) Active 6/16/2023     Levothyroxine Sodium Tablet 137 MCG Active 6/16/2023 6/15/2023    Nitroglycerin Tablet Sublingual 0.4 MG Active 6/15/2023 6/15/2023  This order is potentially duplicated by other orders on the chart. Click to view details.    Furosemide Tablet 20 MG Active 6/15/2023 6/15/2023  This order is potentially duplicated by other orders on the chart. Click to view details.    Milk of Magnesia Suspension 400 MG/5ML (Magnesium Hydroxide) Active 6/15/2023     Acetaminophen Tablet 325 MG Active 6/15/2023     Glucagon  Emergency Active 6/15/2023   This order is potentially duplicated by other orders on the chart. Click to view details.    Trifluoperazine HCl Oral Tablet 2 MG (Trifluoperazine HCl) Active 6/17/2023 6/16/2023    ALPRAZolam Oral Tablet 0.25 MG (Alprazolam) Active 6/17/2023 6/16/2023    HumaLOG KwikPen 100 UNIT/ML Solution pen-injector Active 6/19/2023 6/17/2023    Glimepiride Oral Tablet 4 MG (Glimepiride) Active 6/20/2023 6/20/2023  This order is potentially duplicated by other orders on the chart. Click to view details.    Lasix Oral Tablet 20 MG (Furosemide) Active 6/21/2023 6/20/2023  This order is potentially duplicated by other orders on the chart. Click to view details.    MagOx 400 Oral Tablet (Magnesium Oxide (Mg Supplement)) Active 6/22/2023 6/22/2023    Jardiance Oral Tablet 10 MG (Empagliflozin) Active 6/24/2023 6/23/2023  Objective  There were no vitals taken for this visit.  Physical Exam  Current Vitals   BP: 130/70 mmHg  6/23/2023 11:10    Temp:97.6 °F  6/23/2023 11:10  Pulse:78 bpm  6/23/2023 11:10  Weight:156.3 Lbs  6/19/2023 15:09  Resp:18 Breaths/min  6/23/2023 11:10  BS:255 mg/dL  6/24/2023 08:45  O2:97 %  6/23/2023 11:10  Pain:0  6/24/2023 08:50  Constitutional:       General: He is not in acute distress.     Appearance: Normal appearance.   HENT:   Carotid bruit  Eyes:      Extraocular Movements: Extraocular movements intact.      Conjunctiva/sclera: Conjunctivae normal.   Cardiovascular:   Heart murmur irregular  Pulmonary:     Crackle Skin:     General: Skin is warm.   Neurological: Poststroke sequela with the weakness tingling numbness upper lower extremity more on the left compared to right    Psychiatric:         Anxiety depression  Musculoskeletal arthritis    Review of Systems    Orders Only on 05/17/2023   Component Date Value Ref Range Status   • WBC 05/18/2023 CANCELED   Final-Edited   • nRBC 05/18/2023 CANCELED   Final-Edited   • RBC 05/18/2023 CANCELED   Final-Edited   • Hemoglobin  05/18/2023 CANCELED   Final-Edited   • Hematocrit 05/18/2023 CANCELED   Final-Edited   • MCV 05/18/2023 CANCELED   Final-Edited   • MCHC 05/18/2023 CANCELED   Final-Edited   • Platelets 05/18/2023 CANCELED   Final-Edited   • RDW 05/18/2023 CANCELED   Final-Edited   Orders Only on 05/17/2023   Component Date Value Ref Range Status   • Glucose 05/18/2023 CANCELED   Final-Edited   • Sodium 05/18/2023 CANCELED   Final-Edited   • Potassium 05/18/2023 CANCELED   Final-Edited   • Chloride 05/18/2023 CANCELED   Final-Edited   • Bicarbonate 05/18/2023 CANCELED   Final-Edited   • Anion Gap 05/18/2023 CANCELED   Final-Edited   • Urea Nitrogen 05/18/2023 CANCELED   Final-Edited   • Creatinine 05/18/2023 CANCELED   Final-Edited   • GFR Female 05/18/2023 CANCELED   Final-Edited   • GFR MALE 05/18/2023 CANCELED   Final-Edited   • Calcium 05/18/2023 CANCELED   Final-Edited   Orders Only on 05/17/2023   Component Date Value Ref Range Status   • WBC 05/17/2023 7.3  4.4 - 11.3 x10E9/L Final   • RBC 05/17/2023 3.64 (L)  4.00 - 5.20 x10E12/L Final   • Hemoglobin 05/17/2023 11.5 (L)  12.0 - 16.0 g/dL Final   • Hematocrit 05/17/2023 34.1 (L)  36.0 - 46.0 % Final   • MCV 05/17/2023 94  80 - 100 fL Final   • MCHC 05/17/2023 33.7  32.0 - 36.0 g/dL Final   • Platelets 05/17/2023 262  150 - 450 x10E9/L Final   • RDW 05/17/2023 13.2  11.5 - 14.5 % Final   Orders Only on 05/17/2023   Component Date Value Ref Range Status   • Glucose 05/17/2023 221 (H)  74 - 99 mg/dL Final   • Sodium 05/17/2023 139  136 - 145 mmol/L Final   • Potassium 05/17/2023 4.0  3.5 - 5.3 mmol/L Final   • Chloride 05/17/2023 107  98 - 107 mmol/L Final   • Bicarbonate 05/17/2023 24  21 - 32 mmol/L Final   • Anion Gap 05/17/2023 12  10 - 20 mmol/L Final   • Urea Nitrogen 05/17/2023 13  6 - 23 mg/dL Final   • Creatinine 05/17/2023 0.63  0.50 - 1.05 mg/dL Final   • GFR Female 05/17/2023 82  >90 mL/min/1.73m2 Final   • Calcium 05/17/2023 8.8  8.6 - 10.3 mg/dL Final   Orders Only  on 05/12/2023   Component Date Value Ref Range Status   • WBC 05/13/2023 9.9  4.4 - 11.3 x10E9/L Final   • RBC 05/13/2023 4.04  4.00 - 5.20 x10E12/L Final   • Hemoglobin 05/13/2023 12.9  12.0 - 16.0 g/dL Final   • Hematocrit 05/13/2023 38.3  36.0 - 46.0 % Final   • MCV 05/13/2023 95  80 - 100 fL Final   • MCHC 05/13/2023 33.7  32.0 - 36.0 g/dL Final   • Platelets 05/13/2023 227  150 - 450 x10E9/L Final   • RDW 05/13/2023 13.2  11.5 - 14.5 % Final   Orders Only on 05/12/2023   Component Date Value Ref Range Status   • Glucose 05/13/2023 174 (H)  74 - 99 mg/dL Final   • Sodium 05/13/2023 135 (L)  136 - 145 mmol/L Final   • Potassium 05/13/2023 4.4  3.5 - 5.3 mmol/L Final   • Chloride 05/13/2023 105  98 - 107 mmol/L Final   • Bicarbonate 05/13/2023 21  21 - 32 mmol/L Final   • Anion Gap 05/13/2023 13  10 - 20 mmol/L Final   • Urea Nitrogen 05/13/2023 16  6 - 23 mg/dL Final   • Creatinine 05/13/2023 0.74  0.50 - 1.05 mg/dL Final   • GFR Female 05/13/2023 75  >90 mL/min/1.73m2 Final   • Calcium 05/13/2023 9.0  8.6 - 10.3 mg/dL Final       Radiology: Reviewed imaging in powerchart.  Electrocardiogram 12 Lead    Result Date: 6/24/2023  Sinus rhythm with 1st degree AV block Left bundle branch block Abnormal ECG When compared with ECG of 26-MAY-2023 17:39, No significant change was found Confirmed by Leo Hamlin ([id number]) on 6/24/2023 1:08:08 AM    XR foot    Result Date: 6/12/2023  Interpreted By:  ESTRELLITA HENRY MD MRN: [id number] Patient Name: ESA MCNALLY  STUDY: FOOT, 2 VIEWS;  6/12/2023 4:35 pm  INDICATION: pain .  COMPARISON: None.  ACCESSION NUMBER(S): [id number]  ORDERING CLINICIAN: BRYNN CABRERA  FINDINGS: Bony structures:  Intact. Plantar and posterior calcaneal enthesophytes are noted.  Joint spaces:  Marked hallux valgus angulation and severe hammertoe deformities. Is mild dorsal osteophytosis at the tarsus.  Soft tissues:  Unremarkable without significant edema or radiodense foreign body  Other:  None  significant      No acute findings.    CT brain attack head wo IV contrast    Result Date: 6/11/2023  Interpreted By:  MARILU NUR MD MRN: [id number] Patient Name: ESA MCNALLY  STUDY: NR CT BRAIN ATTACK HEAD WO CONTRAST;  6/11/2023 2:48 am  INDICATION: left leg numbness .  COMPARISON: 6/8/2023  ACCESSION NUMBER(S): [id number]  ORDERING CLINICIAN: AHMET ADAMSON  TECHNIQUE: Contiguous axial images of the head were obtained without intravenous contrast.  FINDINGS: There is stable edema in the right parietal and occipital lobe compatible with patient's known recent bone infarct. There is mild petechial hemorrhage or laminar necrosis which is stable in comparison to prior examination. No hydrocephalus. No midline shift. The basal cisterns are preserved. The paranasal sinuses are clear and well pneumatized.      Stable edema in the right parietal and occipital lobe corresponding to patient's known recent infarct. Mild hyperdensity at site of infarct is also stable and compatible with petechial hemorrhage or cortical laminar necrosis.      CT head wo IV contrast    Result Date: 6/9/2023  Interpreted By:  JEANMARIE MONROE MD, PHD MRN: [id number] Patient Name: ESA MCNALLY  STUDY: CT HEAD WO CONTRAST;  6/8/2023 11:46 pm  INDICATION: follow up petechial hemorrhage .  COMPARISON: 06/08/2023 approximately 4:00 p.m.  ACCESSION NUMBER(S): [id number]  ORDERING CLINICIAN: WILY SALOMON  TECHNIQUE: Noncontrast axial CT scan of head was performed 06/08/2023 at approximately 11:45 p.m.. Angled reformats in brain and bone windows were generated. The images were reviewed in bone, brain, blood and soft tissue windows.  FINDINGS: CSF Spaces: There remains sulcal effacement in right occipital and posterior medial parietal distribution. The ventricles, other sulci and basal cisterns are within normal limits. There is no extraaxial fluid collection.  Parenchyma: There remains involving hypodensity in the right superior  occipital and predominantly posteromedial parietal distribution. The grey-white differentiation is otherwise intact. There is no greater mass effect or midline shift. There remains some subtle hyperdensity on adjacent gyri without interval progression. There is no hematoma..  Calvarium: The calvarium remains within normal limits..  Paranasal sinuses and mastoids: Visualized paranasal sinuses and mastoids are clear.      Stable exam with of all vein nonhemorrhagic infarct principally in right posteromedial parietal distribution with some extension to superior occipital. There is an adjacent sulcal effacement and no hematoma.  Interpreted within [place], [place]    Venous Duplex Ultrasound DVT    Result Date: 6/8/2023  [facility] [address] [place] [address] Tel [phone] Fax [phone] Vascular Lab Report Lower Venous Duplex Ultrasound Patient Name:     ESA DOROTHY MCNALLY Reading Physician:   [id number] Annel Hernandez MD, RPVI Study Date:       6/8/2023             Referring Physician: TALHA OVIEDO MRN/PID:          [id number]             PCP: Accession/Order#: [id number]            CC Report to: YOB: 1929            Technologist:        Renetta Wilder RVT Gender:           F                    Technologist 2: Admission Status: Inpatient            Location Performed:  [facility] Diagnosis/ICD: M79.89-Left leg swelling; M79.89-Right leg swelling Procedure/CPT: 45488 Peripheral venous duplex scan for DVT complete-[id number] Pertinent History: HTN, CAD and CVA. CONCLUSIONS: Right Lower Venous: No evidence of acute deep vein thrombus visualized in the right lower extremity. Left Lower Venous: No evidence of acute deep vein thrombus visualized in the left lower extremity. Comparison: Compared with study from 5/8/2023, no significant change.Negative for deep venous thrombus in bilateral lower extremities. Imaging & Doppler Findings: Right                  Compressible Thrombus        Flow Distal External Iliac     Yes        None   Spontaneous/Phasic CFV                       Yes        None   Spontaneous/Phasic PFV                       Yes        None FV Proximal               Yes        None   Spontaneous/Phasic FV Mid                    Yes        None FV Distal                 Yes        None Popliteal                 Yes        None   Spontaneous/Phasic Peroneal                  Yes        None PTV                       Yes        None Left                  Compress Thrombus        Flow Distal External Iliac   Yes      None   Spontaneous/Phasic CFV                     Yes      None   Spontaneous/Phasic PFV                     Yes      None FV Proximal             Yes      None   Spontaneous/Phasic FV Mid                  Yes      None FV Distal               Yes      None Popliteal               Yes      None   Spontaneous/Phasic Peroneal                Yes      None PTV                     Yes      None [id number] Annel Hernandez MD, RPVI Electronically signed by [id number] Annel Hernandez MD, RPVI on 6/8/2023 at 5:11:09    CT brain attack head wo IV contrast    Result Date: 6/8/2023  Interpreted By:  JESSE FISHER MD MRN: [id number] Patient Name: ESA MCNALLY  STUDY: NR CT BRAIN ATTACK HEAD WO CONTRAST;  6/8/2023 3:59 pm  INDICATION: dizziness and headache .  COMPARISON: Previous exam is from 06/08/2023 at 7:49 a.m...  ACCESSION NUMBER(S): [id number]  ORDERING CLINICIAN: BRYNN CABRERA  TECHNIQUE: Routine axial images were obtained from the skull base through the vertex.  Sagittal and coronal reconstruction images were generated. Brain, subdural, and bone windows were reviewed.  FINDINGS: INTRACRANIAL: Mild prominence of ventricles and sulci. There is mild patchy hypodensity throughout the deep periventricular white matter. There is an acute hypodense infarct in the posterior-medial right parietal-occipital region. There several foci of faint petechial hemorrhage  along the periphery of the infarct, not significantly changed from the most recent prior exam. There is no new acute infarct and there is no new acute intracranial bleed. There is stable effacement of adjacent sulci and gyri. No discernible or measurable midline shift. There is slight mass effect upon the right trigone. No destructive bone lesion. No depressed skull fracture. Skullbase arterial calcifications in the carotid siphons and vertebral arteries.  EXTRACRANIAL: Visualized paranasal sinuses were clear. Visualized mastoid air cells were clear.      Grossly stable acute right PCA distribution infarct with subtle small foci peripheral petechial hemorrhage, not significantly changed.  Mild volume loss.  Mild chronic white matter ischemic disease in the deep periventricular regions.   Document Only: The critical information above was relayed directly by me by Doc Halo to BRYNN CABRERA on 6/8/2023 at 4:10 pm  .    CT head wo IV contrast    Result Date: 6/8/2023  Interpreted By:  ALFIE BOURNE MD, PHD MRN: [id number] Patient Name: ESA MCNALLY  STUDY: CT HEAD WO CONTRAST;  6/8/2023 7:57 am  INDICATION: follow up vertigo; can't have MRI 2/2 PPM .  COMPARISON: Head CT, 06/06/2023 and 05/28/2020  ACCESSION NUMBER(S): [id number]  ORDERING CLINICIAN: WILY SALOMON  TECHNIQUE: Noncontrast axial CT scan of the head was performed. Angled reformats in brain and bone windows were generated. The images were reviewed in bone, brain, blood and soft tissue windows.  FINDINGS: Loss of gray-white differentiation and decreased attenuation in the left parietal and occipital lobes with associated sulcal effacement, similar to previous. An area of cortical hyperdensity at the anterior margin of the infarct (sagittal image 41) is more conspicuous than on the prior study and consistent with petechial hemorrhage. No new intracranial hemorrhage or loss of gray-white differentiation. Unchanged effacement of the atrium and  occipital horn of the right lateral ventricle. No midline shift or herniation. The basal cisterns are patent.  Nonspecific low attenuation in the white matter is similar to previous and likely secondary to chronic small vessel ischemic disease. Ventricular size is otherwise within normal limits. Bilateral lens replacements are again noted. The calvarium is unremarkable. The visualized portions of the paranasal sinuses and mastoid air cells are clear.      Evolving right PCA territory infarct with increased conspicuity of the associated petechial hemorrhage, mass effect is unchanged.    NM lung perfusion particulate    Result Date: 6/7/2023  Interpreted By:  ISADORA SAEZ MD MRN: [id number] Patient Name: ESA MCNALLY  STUDY: PERFUSION LUNG;  6/7/2023 12:06 pm  INDICATION: SOB, elevated d dimer .  COMPARISON: Chest x-ray from 06/06/2023.  ACCESSION NUMBER(S): [id number]  ORDERING CLINICIAN: KARLI MALIN  TECHNIQUE: DIVISION OF NUCLEAR MEDICINE PERFUSION LUNG SCANS  Multiple perfusion images of the lungs were acquired after the intravenous administration of 4.4 mCi of Tc-99m macroaggregated albumin (MAA). In addition, SPECT/CT of the chest was performed.  FINDINGS: Planar perfusion images of both lungs demonstrate mild heterogeneity throughout the lung fields bilaterally. No distinct wedge-shaped subsegmental or segmental perfusion defect seen on SPECT/CT to suggest acute pulmonary embolism..      1. No distinct wedge-shaped subsegmental or segmental perfusion defect seen on SPECT/CT to suggest acute pulmonary embolism (low probability).  The interpretation above is based on modified PIOPED II and PISAPED criteria.  This study was analyzed and interpreted at [place], [place].    CT head wo IV contrast    Result Date: 6/6/2023  Interpreted By:  ARCELIA BARRON MD MRN: [id number] Patient Name: ESA MCNALLY  STUDY: CT HEAD WO CONTRAST;  6/6/2023 6:36 pm  INDICATION: recent stroke hx, weak  today, sob, headache  COMPARISON: 05/28/2023  ACCESSION NUMBER(S): [id number]  ORDERING CLINICIAN: JUVENCIO HERNADEZ  TECHNIQUE: Axial noncontrast CT images of head with coronal and sagittal reconstructed images.  FINDINGS: CT HEAD:  Evidence of right parietal infarct not measurably changed from most recent examination with evolving ischemic changes. No evidence of acute hemorrhage. No mass effect or midline shift. No overt stigmata of new infarction  Vascular calcifications are seen.  If persistent concern, consider further evaluation with MRI mastoid air cells and paranasal sinuses otherwise clear      CT HEAD: Unchanged right parietal subacute infarction. No evidence of hemorrhagic conversion. If persistent concern, further evaluation can be considered with MRI . Stable examination when compared to prior    XR chest 1 view    Result Date: 6/6/2023  Interpreted By:  HAMZAH JHA MD MRN: [id number] Patient Name: ESA MCNALLY  STUDY: CHEST 1 VIEW;  6/6/2023 5:42 pm  INDICATION: Chest Pain .  COMPARISON: Chest radiograph 05/26/2020  ACCESSION NUMBER(S): [id number]  ORDERING CLINICIAN: JUVENCIO HERNADEZ  FINDINGS:   CARDIOMEDIASTINAL SILHOUETTE: Cardiomediastinal silhouette is stable in size and configuration. Left-sided pulse generator device.  LUNGS: No consolidation, pneumothorax, or significant effusion. Similar appearing presumed chronic reticular opacities bilaterally.  ABDOMEN: No remarkable upper abdominal findings.  BONES: No acute osseous changes.      1.  No evidence of acute cardiopulmonary process.      Electrocardiogram 12 Lead    Result Date: 5/31/2023  Sinus rhythm with 1st degree AV block Rightward axis Left bundle branch block Abnormal ECG When compared with ECG of 08-MAY-2023 09:39, No significant change was found Confirmed by Elvin Abdullahi ([id number]) on 5/31/2023 9:46:28 AM    CT head wo IV contrast    Result Date: 5/28/2023  Interpreted By:  MAGY OROZCO MD MRN: [id number] Patient Name: DALI  ESA  STUDY: CT HEAD WO CONTRAST;  5/28/2023 8:15 am  INDICATION: leg weakness .  COMPARISON: 05/27/2023.  ACCESSION NUMBER(S): [id number]  ORDERING CLINICIAN: ARCELIA CAMPBELL  TECHNIQUE: Contiguous unenhanced axial images were obtained through the brain.  FINDINGS: INTRACRANIAL: There is redemonstration of an evolving subacute infarct in the right parietal lobe posteriorly near midline with wedge-shaped region of low-attenuation parenchymal edema similar to prior. No new intracranial bleed is seen. There is regional sulcal effacement. No midline shift. No extra-axial fluid collection or hydrocephalus is seen. Irregular atherosclerotic calcifications again demonstrated in the internal carotid artery and vertebral artery segments.  Bones are intact.  EXTRACRANIAL: Visualized paranasal sinuses and mastoids are clear.      Evolving subacute infarct of the right parietal lobe posteriorly near midline similar to prior. No new intracranial bleed.      CT cervical spine wo IV contrast    Result Date: 5/27/2023  Interpreted By:  JESSE FISHER MD MRN: [id number] Patient Name: ESA MCNALLY  STUDY: CT C-SPINE WO CONTRAST;  5/27/2023 9:34 am  INDICATION: S/P recent stroke with hemorrhagic conversion, now presenting w with bilateral LE weakness .  COMPARISON: None.  ACCESSION NUMBER(S): [id number]  ORDERING CLINICIAN: BENITA RESENDEZ  TECHNIQUE: Thin section axial images were obtained from the skull base down through the thoracic inlet. Sagittal and coronal reconstruction images were generated. Soft tissue, lung, and bone windows were reviewed.  FINDINGS: VERTEBRAL BODIES AND POSTERIOR ELEMENTS: There is slight disc space narrowing at C5-6. Mild-to-moderate endplate osteophytosis at C5-6. Very mild endplate spurring at C2-3, C4-5 and C6-7. Mild anterior spurring also at C7-T1 and T1-2 with disc space height preservation. There is joint space loss with spurring in the anterior superior aspect of the atlantoaxial joint. Mild  calcification of the transverse ligament posterior to the dens. Slight posterior disc bulge at C5-6. Multilevel interfacet hypertrophy with spur formation. Uncovertebral hypertrophy with spur formation bilaterally at C5-6 and C6-7 and on the left at C4-5. No cervical spine compression fracture. No posterior element fracture.  No destructive bone lesion. No listhesis.  SPINAL CANAL: No gross disc herniation.  NECK SOFT TISSUES: Within normal limits.  LUNG APICES: Mild pleural and parenchymal scarring in the posterolateral right apex. Left lung apex was clear..  SKULL BASE: Within normal limits.      Cervical spine DJD as described. No CT evidence of cervical spine fracture in this exam.    CT head wo IV contrast    Result Date: 5/27/2023  Interpreted By:  JESSE FISHER MD MRN: [id number] Patient Name: ESA MCNALLY  STUDY: CT HEAD WO CONTRAST;  5/27/2023 9:33 am  INDICATION: S/P recent stroke with hemorrhagic conversion, now presentingw with bilateral LE weakness .  COMPARISON: Previous exam is from 05/26/2023..  ACCESSION NUMBER(S): [id number]  ORDERING CLINICIAN: BENITA RESENDEZ  TECHNIQUE: Routine axial images were obtained from the skull base through the vertex.  Sagittal and coronal reconstruction images were generated. Brain, subdural, and bone windows were reviewed.  FINDINGS: INTRACRANIAL: Mild prominence of ventricles and sulci. There is mild patchy hypodensity throughout the deep periventricular white matter. There is a subacute large posterior midline right parietal infarct. There is slight further decrease in the previous hyperdense components. No significant change in size. No new infarct. No new  acute intracranial bleed.  No destructive bone lesion. No depressed skull fracture. Skullbase arterial calcifications in the carotid siphons and vertebral arteries.  EXTRACRANIAL: Visualized paranasal sinuses were clear. Visualized mastoid air cells were clear.      Large subacute posterior midline right  "parietal lobe infarct. Slight decrease in the mild hyperdense components, but no other change.  Mild underlying volume loss.  Mild underlying chronic white matter ischemic disease in the deep periventricular regions.    CT head wo IV contrast    Result Date: 5/26/2023  Interpreted By:  MAXIMILIAN MONTALVO MD MRN: [id number] Patient Name: ESA MCNALLY  STUDY: CT HEAD WO CONTRAST;  5/26/2023 9:53 pm  INDICATION: \"dizziness\" recent brain bleed in May .  COMPARISON: 05/10/2023  ACCESSION NUMBER(S): [id number]  ORDERING CLINICIAN: MIKE CARTER  TECHNIQUE: Axial noncontrast CT images of the head.  FINDINGS: BRAIN PARENCHYMA: Stable loss of gray-white differentiation and sulcal effacement in the right parieto-occipital lobe, consistent with subacute to chronic infarct. Previously seen associated hemorrhage has nearly completely resolved. Gray-white matter interfaces are otherwise preserved. No mass effect or midline shift. Mild deep and periventricular white matter hypodensities are nonspecific, but favored to represent chronic small vessel ischemic changes.  HEMORRHAGE: No acute intracranial hemorrhage. VENTRICLES and EXTRA-AXIAL SPACES: The ventricles and sulci are within normal limits in size for brain volume.  No abnormal extraaxial fluid collection. EXTRACRANIAL SOFT TISSUES: Within normal limits. PARANASAL SINUSES/MASTOIDS: The visualized paranasal sinuses and mastoid air cells are aerated. CALVARIUM: No depressed skull fracture. No destructive osseous lesion.  OTHER FINDINGS: None.      No acute intracranial hemorrhage or midline shift.  Subacute to chronic infarct in the right parieto-occipital lobe with nearly completely resolved associated hemorrhage.    XR chest 1 view    Result Date: 5/26/2023  Interpreted By:  PABLITO THORNTON MD MRN: [id number] Patient Name: ESA MCNALLY  STUDY: CHEST 1 VIEW;  5/26/2023 5:59 pm  INDICATION: Chest Pain .  COMPARISON: 05/09/2023  ACCESSION NUMBER(S): [id number]  " ORDERING CLINICIAN: MIKE CARTER  FINDINGS: Stable patchy bilateral suprahilar opacities. No pleural effusion or pneumothorax. Normal heart size. Sternotomy wires. Atherosclerosis. Cardiac device. No acute osseous abnormality.      Stable bilateral suprahilar opacities.      No family history on file.  Social History     Socioeconomic History   • Marital status:      Spouse name: Not on file   • Number of children: Not on file   • Years of education: Not on file   • Highest education level: Not on file   Occupational History   • Not on file   Tobacco Use   • Smoking status: Not on file   • Smokeless tobacco: Not on file   Substance and Sexual Activity   • Alcohol use: Not on file   • Drug use: Not on file   • Sexual activity: Not on file   Other Topics Concern   • Not on file   Social History Narrative   • Not on file     Social Determinants of Health     Financial Resource Strain: Not on file   Food Insecurity: Not on file   Transportation Needs: Not on file   Physical Activity: Not on file   Stress: Not on file   Social Connections: Not on file   Intimate Partner Violence: Not on file   Housing Stability: Not on file     Past Medical History:   Diagnosis Date   • Abnormal weight loss     Weight loss   • Anxiety disorder, unspecified     Anxiety   • Cardiomegaly     LVH (left ventricular hypertrophy)   • Difficulty in walking, not elsewhere classified     Difficulty walking   • Disorder of arteries and arterioles, unspecified (CMS/HCC)     Carotid artery disease   • Essential (primary) hypertension 11/07/2013    Benign essential hypertension   • Hyperlipidemia, unspecified 11/07/2013    Hyperlipidemia   • Other symptoms and signs involving the musculoskeletal system     Limb weakness   • Personal history of other diseases of the circulatory system     Personal history of coronary atherosclerosis   • Personal history of other diseases of the digestive system     History of gastroesophageal reflux  (GERD)   • Personal history of other diseases of the nervous system and sense organs     History of diplopia   • Personal history of other diseases of the respiratory system     History of chronic bronchitis   • Personal history of other endocrine, nutritional and metabolic disease     History of diabetes mellitus   • Personal history of other mental and behavioral disorders     History of depression   • Personal history of other specified conditions     History of diarrhea   • Personal history of other venous thrombosis and embolism     History of deep venous thrombosis   • Stress incontinence (female) (male)     Stress incontinence     Past Surgical History:   Procedure Laterality Date   • CARDIAC SURGERY  11/19/2014    Heart Surgery   • CHOLECYSTECTOMY  11/19/2014    Cholecystectomy   • COLONOSCOPY  11/19/2014    Colonoscopy (Fiberoptic)   • CORONARY ARTERY BYPASS GRAFT  02/11/2014    CABG   • DILATION AND CURETTAGE OF UTERUS  02/11/2014    Dilation And Curettage   • LUMBAR LAMINECTOMY  03/04/2015    Laminectomy Lumbar   • MR HEAD ANGIO WO IV CONTRAST  2/18/2020    MR HEAD ANGIO WO IV CONTRAST 2/18/2020 [facility] CLINICAL LEGACY   • MR NECK ANGIO WO IV CONTRAST  2/18/2020    MR NECK ANGIO WO IV CONTRAST 2/18/2020 [facility] CLINICAL LEGACY   • OTHER SURGICAL HISTORY  10/26/2021    Bronchoscopy   • OTHER SURGICAL HISTORY  10/26/2021    Arterial stent placement   • OTHER SURGICAL HISTORY  10/26/2021    Back surgery   • OTHER SURGICAL HISTORY  10/26/2021    Cataract surgery   • OTHER SURGICAL HISTORY  10/26/2021    Coronary artery bypass graft   • OTHER SURGICAL HISTORY  10/26/2021    Pacemaker insertion   • OTHER SURGICAL HISTORY  10/26/2021    Esophagogastroduodenoscopy   • OTHER SURGICAL HISTORY  02/11/2014    Previous Stent Placement   • TONSILLECTOMY  11/19/2014    Tonsillectomy       Charting was completed using voice recognition technology and may include unintended errors.           Electronically Signed By: Pretty ARIZA  MD Gabriela   6/24/23  9:39 AM

## 2023-11-23 ENCOUNTER — APPOINTMENT (OUTPATIENT)
Dept: RADIOLOGY | Facility: HOSPITAL | Age: 88
End: 2023-11-23
Payer: MEDICARE

## 2023-11-23 ENCOUNTER — HOSPITAL ENCOUNTER (OUTPATIENT)
Facility: HOSPITAL | Age: 88
Setting detail: OBSERVATION
Discharge: NURSING, REHAB OR LONG-TERM CARE | End: 2023-11-28
Attending: STUDENT IN AN ORGANIZED HEALTH CARE EDUCATION/TRAINING PROGRAM | Admitting: INTERNAL MEDICINE
Payer: MEDICARE

## 2023-11-23 DIAGNOSIS — S42.202A CLOSED FRACTURE OF PROXIMAL END OF LEFT HUMERUS, UNSPECIFIED FRACTURE MORPHOLOGY, INITIAL ENCOUNTER: Primary | ICD-10-CM

## 2023-11-23 PROBLEM — S42.352A COMMINUTED LEFT HUMERAL FRACTURE, CLOSED, INITIAL ENCOUNTER: Status: ACTIVE | Noted: 2023-11-23

## 2023-11-23 PROBLEM — S42.202P CLOSED FRACTURE OF PROXIMAL END OF LEFT HUMERUS WITH MALUNION, UNSPECIFIED FRACTURE MORPHOLOGY, SUBSEQUENT ENCOUNTER: Status: ACTIVE | Noted: 2023-11-23

## 2023-11-23 LAB
ALBUMIN SERPL BCP-MCNC: 4.8 G/DL (ref 3.4–5)
ALP SERPL-CCNC: 145 U/L (ref 33–136)
ALT SERPL W P-5'-P-CCNC: 54 U/L (ref 7–45)
ANION GAP SERPL CALC-SCNC: 21 MMOL/L (ref 10–20)
APPEARANCE UR: CLEAR
AST SERPL W P-5'-P-CCNC: 77 U/L (ref 9–39)
BASOPHILS # BLD AUTO: 0.05 X10*3/UL (ref 0–0.1)
BASOPHILS NFR BLD AUTO: 0.3 %
BILIRUB SERPL-MCNC: 0.8 MG/DL (ref 0–1.2)
BILIRUB UR STRIP.AUTO-MCNC: NEGATIVE MG/DL
BUN SERPL-MCNC: 21 MG/DL (ref 6–23)
CALCIUM SERPL-MCNC: 10 MG/DL (ref 8.6–10.3)
CHLORIDE SERPL-SCNC: 102 MMOL/L (ref 98–107)
CK SERPL-CCNC: 161 U/L (ref 0–215)
CO2 SERPL-SCNC: 21 MMOL/L (ref 21–32)
COLOR UR: ABNORMAL
CREAT SERPL-MCNC: 0.73 MG/DL (ref 0.5–1.05)
EOSINOPHIL # BLD AUTO: 0 X10*3/UL (ref 0–0.4)
EOSINOPHIL NFR BLD AUTO: 0 %
ERYTHROCYTE [DISTWIDTH] IN BLOOD BY AUTOMATED COUNT: 15.2 % (ref 11.5–14.5)
GFR SERPL CREATININE-BSD FRML MDRD: 76 ML/MIN/1.73M*2
GLUCOSE SERPL-MCNC: 265 MG/DL (ref 74–99)
GLUCOSE UR STRIP.AUTO-MCNC: ABNORMAL MG/DL
HCT VFR BLD AUTO: 50.1 % (ref 36–46)
HGB BLD-MCNC: 16.6 G/DL (ref 12–16)
HOLD SPECIMEN: NORMAL
IMM GRANULOCYTES # BLD AUTO: 0.06 X10*3/UL (ref 0–0.5)
IMM GRANULOCYTES NFR BLD AUTO: 0.4 % (ref 0–0.9)
KETONES UR STRIP.AUTO-MCNC: ABNORMAL MG/DL
LEUKOCYTE ESTERASE UR QL STRIP.AUTO: NEGATIVE
LYMPHOCYTES # BLD AUTO: 0.58 X10*3/UL (ref 0.8–3)
LYMPHOCYTES NFR BLD AUTO: 4 %
MCH RBC QN AUTO: 29.2 PG (ref 26–34)
MCHC RBC AUTO-ENTMCNC: 33.1 G/DL (ref 32–36)
MCV RBC AUTO: 88 FL (ref 80–100)
MONOCYTES # BLD AUTO: 0.61 X10*3/UL (ref 0.05–0.8)
MONOCYTES NFR BLD AUTO: 4.2 %
NEUTROPHILS # BLD AUTO: 13.15 X10*3/UL (ref 1.6–5.5)
NEUTROPHILS NFR BLD AUTO: 91.1 %
NITRITE UR QL STRIP.AUTO: NEGATIVE
NRBC BLD-RTO: 0 /100 WBCS (ref 0–0)
PH UR STRIP.AUTO: 6 [PH]
PLATELET # BLD AUTO: 159 X10*3/UL (ref 150–450)
POTASSIUM SERPL-SCNC: 3.7 MMOL/L (ref 3.5–5.3)
POTASSIUM SERPL-SCNC: 6.9 MMOL/L (ref 3.5–5.3)
PROT SERPL-MCNC: 8.7 G/DL (ref 6.4–8.2)
PROT UR STRIP.AUTO-MCNC: NEGATIVE MG/DL
RBC # BLD AUTO: 5.69 X10*6/UL (ref 4–5.2)
RBC # UR STRIP.AUTO: NEGATIVE /UL
SODIUM SERPL-SCNC: 137 MMOL/L (ref 136–145)
SP GR UR STRIP.AUTO: 1.02
UROBILINOGEN UR STRIP.AUTO-MCNC: <2 MG/DL
WBC # BLD AUTO: 14.5 X10*3/UL (ref 4.4–11.3)

## 2023-11-23 PROCEDURE — 73030 X-RAY EXAM OF SHOULDER: CPT | Mod: LT,FY

## 2023-11-23 PROCEDURE — 80053 COMPREHEN METABOLIC PANEL: CPT | Performed by: PHYSICIAN ASSISTANT

## 2023-11-23 PROCEDURE — 81003 URINALYSIS AUTO W/O SCOPE: CPT | Performed by: PHYSICIAN ASSISTANT

## 2023-11-23 PROCEDURE — 85025 COMPLETE CBC W/AUTO DIFF WBC: CPT | Performed by: PHYSICIAN ASSISTANT

## 2023-11-23 PROCEDURE — 96372 THER/PROPH/DIAG INJ SC/IM: CPT | Performed by: NURSE PRACTITIONER

## 2023-11-23 PROCEDURE — 94760 N-INVAS EAR/PLS OXIMETRY 1: CPT

## 2023-11-23 PROCEDURE — 72125 CT NECK SPINE W/O DYE: CPT | Performed by: STUDENT IN AN ORGANIZED HEALTH CARE EDUCATION/TRAINING PROGRAM

## 2023-11-23 PROCEDURE — 96376 TX/PRO/DX INJ SAME DRUG ADON: CPT

## 2023-11-23 PROCEDURE — 93010 ELECTROCARDIOGRAM REPORT: CPT | Performed by: INTERNAL MEDICINE

## 2023-11-23 PROCEDURE — 70450 CT HEAD/BRAIN W/O DYE: CPT | Performed by: STUDENT IN AN ORGANIZED HEALTH CARE EDUCATION/TRAINING PROGRAM

## 2023-11-23 PROCEDURE — 73060 X-RAY EXAM OF HUMERUS: CPT | Mod: LT,FY

## 2023-11-23 PROCEDURE — 71045 X-RAY EXAM CHEST 1 VIEW: CPT | Mod: FY

## 2023-11-23 PROCEDURE — 73090 X-RAY EXAM OF FOREARM: CPT | Mod: LT,FY

## 2023-11-23 PROCEDURE — 99285 EMERGENCY DEPT VISIT HI MDM: CPT | Mod: 25 | Performed by: STUDENT IN AN ORGANIZED HEALTH CARE EDUCATION/TRAINING PROGRAM

## 2023-11-23 PROCEDURE — 73564 X-RAY EXAM KNEE 4 OR MORE: CPT | Mod: RT

## 2023-11-23 PROCEDURE — 84132 ASSAY OF SERUM POTASSIUM: CPT | Performed by: PHYSICIAN ASSISTANT

## 2023-11-23 PROCEDURE — 73564 X-RAY EXAM KNEE 4 OR MORE: CPT | Mod: RIGHT SIDE | Performed by: RADIOLOGY

## 2023-11-23 PROCEDURE — 2500000004 HC RX 250 GENERAL PHARMACY W/ HCPCS (ALT 636 FOR OP/ED): Performed by: PHYSICIAN ASSISTANT

## 2023-11-23 PROCEDURE — 96374 THER/PROPH/DIAG INJ IV PUSH: CPT

## 2023-11-23 PROCEDURE — 36415 COLL VENOUS BLD VENIPUNCTURE: CPT | Performed by: PHYSICIAN ASSISTANT

## 2023-11-23 PROCEDURE — 73522 X-RAY EXAM HIPS BI 3-4 VIEWS: CPT | Mod: BILATERAL PROCEDURE | Performed by: RADIOLOGY

## 2023-11-23 PROCEDURE — 72125 CT NECK SPINE W/O DYE: CPT

## 2023-11-23 PROCEDURE — 82550 ASSAY OF CK (CPK): CPT | Performed by: PHYSICIAN ASSISTANT

## 2023-11-23 PROCEDURE — 2500000004 HC RX 250 GENERAL PHARMACY W/ HCPCS (ALT 636 FOR OP/ED): Performed by: NURSE PRACTITIONER

## 2023-11-23 PROCEDURE — 70450 CT HEAD/BRAIN W/O DYE: CPT

## 2023-11-23 PROCEDURE — 73060 X-RAY EXAM OF HUMERUS: CPT | Mod: LEFT SIDE | Performed by: RADIOLOGY

## 2023-11-23 PROCEDURE — G0378 HOSPITAL OBSERVATION PER HR: HCPCS

## 2023-11-23 PROCEDURE — 99285 EMERGENCY DEPT VISIT HI MDM: CPT | Performed by: STUDENT IN AN ORGANIZED HEALTH CARE EDUCATION/TRAINING PROGRAM

## 2023-11-23 PROCEDURE — 2500000001 HC RX 250 WO HCPCS SELF ADMINISTERED DRUGS (ALT 637 FOR MEDICARE OP): Performed by: NURSE PRACTITIONER

## 2023-11-23 PROCEDURE — 71045 X-RAY EXAM CHEST 1 VIEW: CPT | Performed by: RADIOLOGY

## 2023-11-23 PROCEDURE — 73521 X-RAY EXAM HIPS BI 2 VIEWS: CPT | Mod: FY

## 2023-11-23 PROCEDURE — 73030 X-RAY EXAM OF SHOULDER: CPT | Mod: LEFT SIDE | Performed by: RADIOLOGY

## 2023-11-23 PROCEDURE — 96361 HYDRATE IV INFUSION ADD-ON: CPT

## 2023-11-23 PROCEDURE — 73090 X-RAY EXAM OF FOREARM: CPT | Mod: LEFT SIDE | Performed by: RADIOLOGY

## 2023-11-23 PROCEDURE — 99223 1ST HOSP IP/OBS HIGH 75: CPT | Performed by: NURSE PRACTITIONER

## 2023-11-23 PROCEDURE — 2500000001 HC RX 250 WO HCPCS SELF ADMINISTERED DRUGS (ALT 637 FOR MEDICARE OP): Performed by: PHYSICIAN ASSISTANT

## 2023-11-23 RX ORDER — CHLORPROMAZINE HYDROCHLORIDE 10 MG/1
10 TABLET, FILM COATED ORAL NIGHTLY
Status: DISCONTINUED | OUTPATIENT
Start: 2023-11-23 | End: 2023-11-28 | Stop reason: HOSPADM

## 2023-11-23 RX ORDER — OMEGA-3-ACID ETHYL ESTERS 1 G/1
2 CAPSULE, LIQUID FILLED ORAL DAILY
COMMUNITY

## 2023-11-23 RX ORDER — INSULIN LISPRO 100 [IU]/ML
0-5 INJECTION, SOLUTION INTRAVENOUS; SUBCUTANEOUS
Status: DISCONTINUED | OUTPATIENT
Start: 2023-11-24 | End: 2023-11-28 | Stop reason: HOSPADM

## 2023-11-23 RX ORDER — ATORVASTATIN CALCIUM 80 MG/1
80 TABLET, FILM COATED ORAL DAILY
Status: DISCONTINUED | OUTPATIENT
Start: 2023-11-24 | End: 2023-11-28 | Stop reason: HOSPADM

## 2023-11-23 RX ORDER — ACETAMINOPHEN 160 MG/5ML
650 SOLUTION ORAL EVERY 4 HOURS PRN
Status: DISCONTINUED | OUTPATIENT
Start: 2023-11-23 | End: 2023-11-23

## 2023-11-23 RX ORDER — TRIFLUOPERAZINE HYDROCHLORIDE 2 MG/1
2 TABLET, FILM COATED ORAL NIGHTLY
COMMUNITY

## 2023-11-23 RX ORDER — LOSARTAN POTASSIUM 100 MG/1
100 TABLET ORAL DAILY
COMMUNITY

## 2023-11-23 RX ORDER — AMLODIPINE BESYLATE 10 MG/1
10 TABLET ORAL DAILY
Status: COMPLETED | OUTPATIENT
Start: 2023-11-23 | End: 2023-11-23

## 2023-11-23 RX ORDER — MULTIVIT-MIN/IRON FUM/FOLIC AC 7.5 MG-4
1 TABLET ORAL DAILY
COMMUNITY

## 2023-11-23 RX ORDER — METOPROLOL TARTRATE 25 MG/1
25 TABLET, FILM COATED ORAL 2 TIMES DAILY
COMMUNITY
End: 2023-11-28 | Stop reason: HOSPADM

## 2023-11-23 RX ORDER — TALC
3 POWDER (GRAM) TOPICAL DAILY
Status: DISCONTINUED | OUTPATIENT
Start: 2023-11-23 | End: 2023-11-28 | Stop reason: HOSPADM

## 2023-11-23 RX ORDER — FUROSEMIDE 20 MG/1
20 TABLET ORAL
COMMUNITY

## 2023-11-23 RX ORDER — POLYETHYLENE GLYCOL 3350 17 G/17G
17 POWDER, FOR SOLUTION ORAL DAILY PRN
Status: DISCONTINUED | OUTPATIENT
Start: 2023-11-23 | End: 2023-11-28 | Stop reason: HOSPADM

## 2023-11-23 RX ORDER — FUROSEMIDE 20 MG/1
20 TABLET ORAL
Status: DISCONTINUED | OUTPATIENT
Start: 2023-11-24 | End: 2023-11-28 | Stop reason: HOSPADM

## 2023-11-23 RX ORDER — ASPIRIN 81 MG/1
81 TABLET ORAL DAILY
COMMUNITY

## 2023-11-23 RX ORDER — SODIUM CHLORIDE 9 MG/ML
100 INJECTION, SOLUTION INTRAVENOUS CONTINUOUS
Status: DISCONTINUED | OUTPATIENT
Start: 2023-11-23 | End: 2023-11-25

## 2023-11-23 RX ORDER — AMLODIPINE BESYLATE 10 MG/1
10 TABLET ORAL DAILY
Status: DISCONTINUED | OUTPATIENT
Start: 2023-11-24 | End: 2023-11-28 | Stop reason: HOSPADM

## 2023-11-23 RX ORDER — DEXTROSE 50 % IN WATER (D50W) INTRAVENOUS SYRINGE
25
Status: DISCONTINUED | OUTPATIENT
Start: 2023-11-23 | End: 2023-11-28 | Stop reason: HOSPADM

## 2023-11-23 RX ORDER — ASCORBIC ACID 500 MG
500 TABLET ORAL DAILY
COMMUNITY

## 2023-11-23 RX ORDER — METOPROLOL TARTRATE 25 MG/1
25 TABLET, FILM COATED ORAL 2 TIMES DAILY
Status: COMPLETED | OUTPATIENT
Start: 2023-11-23 | End: 2023-11-23

## 2023-11-23 RX ORDER — MORPHINE SULFATE 2 MG/ML
2 INJECTION, SOLUTION INTRAMUSCULAR; INTRAVENOUS ONCE
Status: COMPLETED | OUTPATIENT
Start: 2023-11-23 | End: 2023-11-23

## 2023-11-23 RX ORDER — PANTOPRAZOLE SODIUM 20 MG/1
20 TABLET, DELAYED RELEASE ORAL
Status: DISCONTINUED | OUTPATIENT
Start: 2023-11-24 | End: 2023-11-28 | Stop reason: HOSPADM

## 2023-11-23 RX ORDER — ACETAMINOPHEN 650 MG/1
650 SUPPOSITORY RECTAL EVERY 4 HOURS PRN
Status: DISCONTINUED | OUTPATIENT
Start: 2023-11-23 | End: 2023-11-23

## 2023-11-23 RX ORDER — ASPIRIN 81 MG/1
81 TABLET ORAL DAILY
Status: DISCONTINUED | OUTPATIENT
Start: 2023-11-24 | End: 2023-11-28 | Stop reason: HOSPADM

## 2023-11-23 RX ORDER — LEVOTHYROXINE SODIUM 100 UG/1
100 TABLET ORAL
Status: DISCONTINUED | OUTPATIENT
Start: 2023-11-24 | End: 2023-11-28 | Stop reason: HOSPADM

## 2023-11-23 RX ORDER — ELECTROLYTES/DEXTROSE
5 SOLUTION, ORAL ORAL DAILY
COMMUNITY

## 2023-11-23 RX ORDER — ACETAMINOPHEN 325 MG/1
650 TABLET ORAL EVERY 4 HOURS PRN
Status: DISCONTINUED | OUTPATIENT
Start: 2023-11-23 | End: 2023-11-28 | Stop reason: HOSPADM

## 2023-11-23 RX ORDER — FOLIC ACID 1 MG/1
1 TABLET ORAL DAILY
COMMUNITY

## 2023-11-23 RX ORDER — ENOXAPARIN SODIUM 100 MG/ML
40 INJECTION SUBCUTANEOUS EVERY 24 HOURS
Status: DISCONTINUED | OUTPATIENT
Start: 2023-11-23 | End: 2023-11-28 | Stop reason: HOSPADM

## 2023-11-23 RX ORDER — ALPRAZOLAM 0.25 MG/1
0.12 TABLET ORAL 2 TIMES DAILY
COMMUNITY

## 2023-11-23 RX ORDER — OMEPRAZOLE 10 MG/1
10 CAPSULE, DELAYED RELEASE ORAL
COMMUNITY

## 2023-11-23 RX ORDER — AMLODIPINE BESYLATE 10 MG/1
10 TABLET ORAL DAILY
COMMUNITY

## 2023-11-23 RX ORDER — LEVOTHYROXINE SODIUM 100 UG/1
100 TABLET ORAL
COMMUNITY

## 2023-11-23 RX ORDER — DEXTROSE MONOHYDRATE 100 MG/ML
0.3 INJECTION, SOLUTION INTRAVENOUS ONCE AS NEEDED
Status: DISCONTINUED | OUTPATIENT
Start: 2023-11-23 | End: 2023-11-28 | Stop reason: HOSPADM

## 2023-11-23 RX ORDER — ALPRAZOLAM 0.25 MG/1
0.12 TABLET ORAL 2 TIMES DAILY
Status: DISCONTINUED | OUTPATIENT
Start: 2023-11-23 | End: 2023-11-28 | Stop reason: HOSPADM

## 2023-11-23 RX ORDER — LOSARTAN POTASSIUM 100 MG/1
100 TABLET ORAL DAILY
Status: DISCONTINUED | OUTPATIENT
Start: 2023-11-24 | End: 2023-11-28 | Stop reason: HOSPADM

## 2023-11-23 RX ORDER — ATORVASTATIN CALCIUM 80 MG/1
80 TABLET, FILM COATED ORAL DAILY
Status: ON HOLD | COMMUNITY
End: 2024-01-09 | Stop reason: ALTCHOICE

## 2023-11-23 RX ORDER — METOPROLOL TARTRATE 25 MG/1
25 TABLET, FILM COATED ORAL 2 TIMES DAILY
Status: DISCONTINUED | OUTPATIENT
Start: 2023-11-23 | End: 2023-11-24

## 2023-11-23 RX ORDER — CHLORPROMAZINE HYDROCHLORIDE 10 MG/1
10 TABLET, FILM COATED ORAL NIGHTLY
COMMUNITY

## 2023-11-23 RX ORDER — GLIMEPIRIDE 4 MG/1
4 TABLET ORAL 2 TIMES DAILY
COMMUNITY

## 2023-11-23 RX ORDER — MORPHINE SULFATE 2 MG/ML
2 INJECTION, SOLUTION INTRAMUSCULAR; INTRAVENOUS EVERY 4 HOURS PRN
Status: DISCONTINUED | OUTPATIENT
Start: 2023-11-23 | End: 2023-11-28 | Stop reason: HOSPADM

## 2023-11-23 RX ORDER — TRIFLUOPERAZINE HYDROCHLORIDE 2 MG/1
2 TABLET, FILM COATED ORAL NIGHTLY
Status: DISCONTINUED | OUTPATIENT
Start: 2023-11-23 | End: 2023-11-28 | Stop reason: HOSPADM

## 2023-11-23 RX ADMIN — AMLODIPINE BESYLATE 10 MG: 10 TABLET ORAL at 16:43

## 2023-11-23 RX ADMIN — ENOXAPARIN SODIUM 40 MG: 40 INJECTION SUBCUTANEOUS at 16:43

## 2023-11-23 RX ADMIN — METOPROLOL TARTRATE 25 MG: 25 TABLET, FILM COATED ORAL at 20:52

## 2023-11-23 RX ADMIN — MORPHINE SULFATE 2 MG: 2 INJECTION, SOLUTION INTRAMUSCULAR; INTRAVENOUS at 12:25

## 2023-11-23 RX ADMIN — ALPRAZOLAM 0.12 MG: 0.25 TABLET ORAL at 23:37

## 2023-11-23 RX ADMIN — MORPHINE SULFATE 2 MG: 2 INJECTION, SOLUTION INTRAMUSCULAR; INTRAVENOUS at 10:25

## 2023-11-23 RX ADMIN — SODIUM CHLORIDE, POTASSIUM CHLORIDE, SODIUM LACTATE AND CALCIUM CHLORIDE 1000 ML: 600; 310; 30; 20 INJECTION, SOLUTION INTRAVENOUS at 11:33

## 2023-11-23 RX ADMIN — CHLORPROMAZINE HYDROCHLORIDE 10 MG: 10 TABLET, FILM COATED ORAL at 23:37

## 2023-11-23 RX ADMIN — Medication 3 MG: at 20:52

## 2023-11-23 RX ADMIN — SODIUM CHLORIDE 100 ML/HR: 9 INJECTION, SOLUTION INTRAVENOUS at 16:43

## 2023-11-23 RX ADMIN — MORPHINE SULFATE 2 MG: 2 INJECTION, SOLUTION INTRAMUSCULAR; INTRAVENOUS at 16:59

## 2023-11-23 SDOH — SOCIAL STABILITY: SOCIAL INSECURITY: ARE YOU OR HAVE YOU BEEN THREATENED OR ABUSED PHYSICALLY, EMOTIONALLY, OR SEXUALLY BY ANYONE?: NO

## 2023-11-23 SDOH — SOCIAL STABILITY: SOCIAL INSECURITY: ABUSE: ADULT

## 2023-11-23 SDOH — SOCIAL STABILITY: SOCIAL INSECURITY: ARE THERE ANY APPARENT SIGNS OF INJURIES/BEHAVIORS THAT COULD BE RELATED TO ABUSE/NEGLECT?: NO

## 2023-11-23 SDOH — SOCIAL STABILITY: SOCIAL INSECURITY: HAVE YOU HAD THOUGHTS OF HARMING ANYONE ELSE?: NO

## 2023-11-23 SDOH — SOCIAL STABILITY: SOCIAL INSECURITY: DO YOU FEEL ANYONE HAS EXPLOITED OR TAKEN ADVANTAGE OF YOU FINANCIALLY OR OF YOUR PERSONAL PROPERTY?: NO

## 2023-11-23 SDOH — SOCIAL STABILITY: SOCIAL INSECURITY: DOES ANYONE TRY TO KEEP YOU FROM HAVING/CONTACTING OTHER FRIENDS OR DOING THINGS OUTSIDE YOUR HOME?: NO

## 2023-11-23 SDOH — SOCIAL STABILITY: SOCIAL INSECURITY: HAS ANYONE EVER THREATENED TO HURT YOUR FAMILY OR YOUR PETS?: NO

## 2023-11-23 SDOH — SOCIAL STABILITY: SOCIAL INSECURITY: DO YOU FEEL UNSAFE GOING BACK TO THE PLACE WHERE YOU ARE LIVING?: NO

## 2023-11-23 ASSESSMENT — LIFESTYLE VARIABLES
EVER HAD A DRINK FIRST THING IN THE MORNING TO STEADY YOUR NERVES TO GET RID OF A HANGOVER: NO
REASON UNABLE TO ASSESS: NO
HOW OFTEN DO YOU HAVE A DRINK CONTAINING ALCOHOL: NEVER
HAVE PEOPLE ANNOYED YOU BY CRITICIZING YOUR DRINKING: NO
SKIP TO QUESTIONS 9-10: 1
HOW MANY STANDARD DRINKS CONTAINING ALCOHOL DO YOU HAVE ON A TYPICAL DAY: PATIENT DOES NOT DRINK
HOW OFTEN DO YOU HAVE 6 OR MORE DRINKS ON ONE OCCASION: NEVER
EVER FELT BAD OR GUILTY ABOUT YOUR DRINKING: NO
AUDIT-C TOTAL SCORE: 0
AUDIT-C TOTAL SCORE: 0
HAVE YOU EVER FELT YOU SHOULD CUT DOWN ON YOUR DRINKING: NO

## 2023-11-23 ASSESSMENT — PATIENT HEALTH QUESTIONNAIRE - PHQ9
SUM OF ALL RESPONSES TO PHQ9 QUESTIONS 1 & 2: 0
2. FEELING DOWN, DEPRESSED OR HOPELESS: NOT AT ALL
1. LITTLE INTEREST OR PLEASURE IN DOING THINGS: NOT AT ALL

## 2023-11-23 ASSESSMENT — PAIN SCALES - GENERAL
PAINLEVEL_OUTOF10: 6
PAINLEVEL_OUTOF10: 10 - WORST POSSIBLE PAIN
PAINLEVEL_OUTOF10: 6
PAINLEVEL_OUTOF10: 4
PAINLEVEL_OUTOF10: 10 - WORST POSSIBLE PAIN
PAINLEVEL_OUTOF10: 8

## 2023-11-23 ASSESSMENT — ACTIVITIES OF DAILY LIVING (ADL)
JUDGMENT_ADEQUATE_SAFELY_COMPLETE_DAILY_ACTIVITIES: YES
LACK_OF_TRANSPORTATION: PATIENT DECLINED
GROOMING: NEEDS ASSISTANCE
DRESSING YOURSELF: NEEDS ASSISTANCE
PATIENT'S MEMORY ADEQUATE TO SAFELY COMPLETE DAILY ACTIVITIES?: YES
HEARING - LEFT EAR: FUNCTIONAL
WALKS IN HOME: NEEDS ASSISTANCE
ASSISTIVE_DEVICE: WALKER
HEARING - RIGHT EAR: FUNCTIONAL
BATHING: NEEDS ASSISTANCE
TOILETING: NEEDS ASSISTANCE
ADEQUATE_TO_COMPLETE_ADL: YES
FEEDING YOURSELF: NEEDS ASSISTANCE

## 2023-11-23 ASSESSMENT — COGNITIVE AND FUNCTIONAL STATUS - GENERAL
MOVING FROM LYING ON BACK TO SITTING ON SIDE OF FLAT BED WITH BEDRAILS: A LITTLE
DRESSING REGULAR UPPER BODY CLOTHING: A LITTLE
HELP NEEDED FOR BATHING: A LITTLE
MOBILITY SCORE: 18
DRESSING REGULAR LOWER BODY CLOTHING: A LITTLE
WALKING IN HOSPITAL ROOM: A LITTLE
MOVING TO AND FROM BED TO CHAIR: A LITTLE
EATING MEALS: A LITTLE
DAILY ACTIVITIY SCORE: 18
TOILETING: A LITTLE
TURNING FROM BACK TO SIDE WHILE IN FLAT BAD: A LITTLE
PATIENT BASELINE BEDBOUND: NO
STANDING UP FROM CHAIR USING ARMS: A LITTLE
PERSONAL GROOMING: A LITTLE
CLIMB 3 TO 5 STEPS WITH RAILING: A LITTLE

## 2023-11-23 ASSESSMENT — ENCOUNTER SYMPTOMS
ABDOMINAL PAIN: 0
POLYPHAGIA: 0
HEADACHES: 0
CHILLS: 0
MYALGIAS: 0
UNEXPECTED WEIGHT CHANGE: 0
ABDOMINAL DISTENTION: 0
DIFFICULTY URINATING: 0
DYSURIA: 0
CONFUSION: 0
DIARRHEA: 0
BLOOD IN STOOL: 0
WEAKNESS: 0
SHORTNESS OF BREATH: 0
EYE REDNESS: 0
FEVER: 0
PALPITATIONS: 0
WOUND: 0
SPEECH DIFFICULTY: 0
VOMITING: 0
DIAPHORESIS: 0
JOINT SWELLING: 1
POLYDIPSIA: 0
COUGH: 0
WHEEZING: 0
HEMATURIA: 0
FREQUENCY: 0
EYE DISCHARGE: 0
NAUSEA: 0
CHEST TIGHTNESS: 0
DIZZINESS: 0
CONSTIPATION: 0
FATIGUE: 0
HALLUCINATIONS: 0
TROUBLE SWALLOWING: 0
NUMBNESS: 0

## 2023-11-23 ASSESSMENT — PAIN - FUNCTIONAL ASSESSMENT
PAIN_FUNCTIONAL_ASSESSMENT: 0-10
PAIN_FUNCTIONAL_ASSESSMENT: 0-10

## 2023-11-23 ASSESSMENT — COLUMBIA-SUICIDE SEVERITY RATING SCALE - C-SSRS
2. HAVE YOU ACTUALLY HAD ANY THOUGHTS OF KILLING YOURSELF?: NO
1. IN THE PAST MONTH, HAVE YOU WISHED YOU WERE DEAD OR WISHED YOU COULD GO TO SLEEP AND NOT WAKE UP?: NO
6. HAVE YOU EVER DONE ANYTHING, STARTED TO DO ANYTHING, OR PREPARED TO DO ANYTHING TO END YOUR LIFE?: NO

## 2023-11-23 ASSESSMENT — PAIN DESCRIPTION - ORIENTATION
ORIENTATION: LEFT
ORIENTATION: LEFT

## 2023-11-23 ASSESSMENT — PAIN DESCRIPTION - LOCATION
LOCATION: SHOULDER
LOCATION: SHOULDER

## 2023-11-23 NOTE — H&P
History Of Present Illness  Toshia Méndez is a 89 y.o. female with medical history of HTN, CAD, HLD, anxiety, type 2 diabetes, hypothyroidism, CKD stage III, and CVA with residual left-sided weakness presented to [facility] on 11/23/2023 as post mechanical fall with left shoulder pain.  Patient was up in her bathroom at 3 AM, she was getting off the toilet reaching for a towel on a grab bar.  Towel slid off grab bar causing patient to fall to the ground.  Patient states she hit the left side of her head, left shoulder, and left hip on the floor.  She is complaining of some mild hip tenderness both hips with palpation and right knee pain along with left shoulder pain.  Patient has residual left-sided weakness and requires walker or wheelchair to get around the house.  Prior to fall patient was in her normal state of health denying any recent illness. Patient denies recent fever/chills, cough, cold symptoms, chest pain, palpitations, lightheadedness/dizziness, shortness of breath, abdominal pain, N/V/D, urinary symptoms or leg swelling.     Past Medical History  Past Medical History:   Diagnosis Date    Abnormal weight loss     Weight loss    Anxiety disorder, unspecified     Anxiety    Cardiomegaly     LVH (left ventricular hypertrophy)    Difficulty in walking, not elsewhere classified     Difficulty walking    Disorder of arteries and arterioles, unspecified (CMS/[facility])     Carotid artery disease    Essential (primary) hypertension 11/07/2013    Benign essential hypertension    Hyperlipidemia, unspecified 11/07/2013    Hyperlipidemia    Other symptoms and signs involving the musculoskeletal system     Limb weakness    Personal history of other diseases of the circulatory system     Personal history of coronary atherosclerosis    Personal history of other diseases of the digestive system     History of gastroesophageal reflux (GERD)    Personal history of other diseases of the nervous system and sense organs      History of diplopia    Personal history of other diseases of the respiratory system     History of chronic bronchitis    Personal history of other endocrine, nutritional and metabolic disease     History of diabetes mellitus    Personal history of other mental and behavioral disorders     History of depression    Personal history of other specified conditions     History of diarrhea    Personal history of other venous thrombosis and embolism     History of deep venous thrombosis    Stress incontinence (female) (male)     Stress incontinence       Surgical History  Past Surgical History:   Procedure Laterality Date    CARDIAC SURGERY  11/19/2014    Heart Surgery    CHOLECYSTECTOMY  11/19/2014    Cholecystectomy    COLONOSCOPY  11/19/2014    Colonoscopy (Fiberoptic)    CORONARY ARTERY BYPASS GRAFT  02/11/2014    CABG    DILATION AND CURETTAGE OF UTERUS  02/11/2014    Dilation And Curettage    LUMBAR LAMINECTOMY  03/04/2015    Laminectomy Lumbar    MR HEAD ANGIO WO IV CONTRAST  2/18/2020    MR HEAD ANGIO WO IV CONTRAST 2/18/2020 [facility] CLINICAL LEGACY    MR NECK ANGIO WO IV CONTRAST  2/18/2020    MR NECK ANGIO WO IV CONTRAST 2/18/2020 [facility] CLINICAL LEGACY    OTHER SURGICAL HISTORY  10/26/2021    Bronchoscopy    OTHER SURGICAL HISTORY  10/26/2021    Arterial stent placement    OTHER SURGICAL HISTORY  10/26/2021    Back surgery    OTHER SURGICAL HISTORY  10/26/2021    Cataract surgery    OTHER SURGICAL HISTORY  10/26/2021    Coronary artery bypass graft    OTHER SURGICAL HISTORY  10/26/2021    Pacemaker insertion    OTHER SURGICAL HISTORY  10/26/2021    Esophagogastroduodenoscopy    OTHER SURGICAL HISTORY  02/11/2014    Previous Stent Placement    TONSILLECTOMY  11/19/2014    Tonsillectomy        Social History  Social History     Tobacco Use    Smoking status: Unknown    Smokeless tobacco: Never   Substance Use Topics    Alcohol use: Not Currently    Drug use: Not Currently        Family History  No family history on  file.     Allergies  Iodinated contrast media, Iodine, Sulfa (sulfonamide antibiotics), and Sulfamethoxazole-trimethoprim    Review of Systems   Constitutional:  Negative for chills, diaphoresis, fatigue, fever and unexpected weight change.   HENT:  Negative for congestion and trouble swallowing.    Eyes:  Negative for discharge, redness and visual disturbance.   Respiratory:  Negative for cough, chest tightness, shortness of breath and wheezing.    Cardiovascular:  Negative for chest pain, palpitations and leg swelling.   Gastrointestinal:  Negative for abdominal distention, abdominal pain, blood in stool, constipation, diarrhea, nausea and vomiting.   Endocrine: Negative for polydipsia, polyphagia and polyuria.   Genitourinary:  Negative for difficulty urinating, dysuria, frequency, hematuria and urgency.   Musculoskeletal:  Positive for joint swelling. Negative for myalgias.   Skin:  Negative for rash and wound.   Neurological:  Negative for dizziness, syncope, speech difficulty, weakness, numbness and headaches.   Psychiatric/Behavioral:  Negative for behavioral problems, confusion, hallucinations and suicidal ideas.             Physical Exam  Constitutional:       Appearance: Normal appearance.   HENT:      Head: Normocephalic.      Mouth/Throat:      Mouth: Mucous membranes are moist.      Pharynx: Oropharynx is clear.   Eyes:      Extraocular Movements: Extraocular movements intact.      Pupils: Pupils are equal, round, and reactive to light.   Cardiovascular:      Rate and Rhythm: Normal rate and regular rhythm.      Pulses: Normal pulses.      Heart sounds: Normal heart sounds.   Pulmonary:      Effort: Pulmonary effort is normal.      Breath sounds: Normal breath sounds.   Abdominal:      General: Abdomen is flat. Bowel sounds are normal.      Palpations: Abdomen is soft.   Musculoskeletal:         General: Tenderness (bilateral hips and right knee) and signs of injury (left shoulder) present. Normal range  of motion.      Cervical back: Normal range of motion.   Skin:     General: Skin is warm and dry.      Capillary Refill: Capillary refill takes less than 2 seconds.   Neurological:      General: No focal deficit present.      Mental Status: She is alert and oriented to person, place, and time. Mental status is at baseline.   Psychiatric:         Mood and Affect: Mood normal.             Last Recorded Vitals  Visit Vitals  BP (!) 193/76   Pulse 94   Temp 36.9 °C (98.4 °F)   Resp 18   Wt 67.9 kg (149 lb 11.1 oz)   SpO2 93%   BMI 27.41 kg/m²   Smoking Status Unknown   BSA 1.72 m²        Relevant Results  Results for orders placed or performed during the hospital encounter of 11/23/23 (from the past 24 hour(s))   CBC and Auto Differential   Result Value Ref Range    WBC 14.5 (H) 4.4 - 11.3 x10*3/uL    nRBC 0.0 0.0 - 0.0 /100 WBCs    RBC 5.69 (H) 4.00 - 5.20 x10*6/uL    Hemoglobin 16.6 (H) 12.0 - 16.0 g/dL    Hematocrit 50.1 (H) 36.0 - 46.0 %    MCV 88 80 - 100 fL    MCH 29.2 26.0 - 34.0 pg    MCHC 33.1 32.0 - 36.0 g/dL    RDW 15.2 (H) 11.5 - 14.5 %    Platelets 159 150 - 450 x10*3/uL    Neutrophils % 91.1 40.0 - 80.0 %    Immature Granulocytes %, Automated 0.4 0.0 - 0.9 %    Lymphocytes % 4.0 13.0 - 44.0 %    Monocytes % 4.2 2.0 - 10.0 %    Eosinophils % 0.0 0.0 - 6.0 %    Basophils % 0.3 0.0 - 2.0 %    Neutrophils Absolute 13.15 (H) 1.60 - 5.50 x10*3/uL    Immature Granulocytes Absolute, Automated 0.06 0.00 - 0.50 x10*3/uL    Lymphocytes Absolute 0.58 (L) 0.80 - 3.00 x10*3/uL    Monocytes Absolute 0.61 0.05 - 0.80 x10*3/uL    Eosinophils Absolute 0.00 0.00 - 0.40 x10*3/uL    Basophils Absolute 0.05 0.00 - 0.10 x10*3/uL   Comprehensive Metabolic Panel   Result Value Ref Range    Glucose 265 (H) 74 - 99 mg/dL    Sodium 137 136 - 145 mmol/L    Potassium 6.9 (HH) 3.5 - 5.3 mmol/L    Chloride 102 98 - 107 mmol/L    Bicarbonate 21 21 - 32 mmol/L    Anion Gap 21 (H) 10 - 20 mmol/L    Urea Nitrogen 21 6 - 23 mg/dL     Creatinine 0.73 0.50 - 1.05 mg/dL    eGFR 76 >60 mL/min/1.73m*2    Calcium 10.0 8.6 - 10.3 mg/dL    Albumin 4.8 3.4 - 5.0 g/dL    Alkaline Phosphatase 145 (H) 33 - 136 U/L    Total Protein 8.7 (H) 6.4 - 8.2 g/dL    AST 77 (H) 9 - 39 U/L    Bilirubin, Total 0.8 0.0 - 1.2 mg/dL    ALT 54 (H) 7 - 45 U/L   Cardiac Enzymes - CPK   Result Value Ref Range    Creatine Kinase 161 0 - 215 U/L   Urinalysis with Reflex Microscopic and Culture   Result Value Ref Range    Color, Urine Straw Straw, Yellow    Appearance, Urine Clear Clear    Specific Gravity, Urine 1.016 1.005 - 1.035    pH, Urine 6.0 5.0, 5.5, 6.0, 6.5, 7.0, 7.5, 8.0    Protein, Urine NEGATIVE NEGATIVE mg/dL    Glucose, Urine >=500 (3+) (A) NEGATIVE mg/dL    Blood, Urine NEGATIVE NEGATIVE    Ketones, Urine 20 (1+) (A) NEGATIVE mg/dL    Bilirubin, Urine NEGATIVE NEGATIVE    Urobilinogen, Urine <2.0 <2.0 mg/dL    Nitrite, Urine NEGATIVE NEGATIVE    Leukocyte Esterase, Urine NEGATIVE NEGATIVE   Extra Urine Gray Tube   Result Value Ref Range    Extra Tube Hold for add-ons.    Potassium   Result Value Ref Range    Potassium 3.7 3.5 - 5.3 mmol/L      EKG:  No results found for this or any previous visit (from the past 4464 hour(s)).  Echo:  No results found for this or any previous visit.      Home Medications  Prior to Admission medications    Not on File       Medications  Scheduled medications  enoxaparin, 40 mg, subcutaneous, q24h  melatonin, 3 mg, oral, Daily      Continuous medications  sodium chloride 0.9%, 100 mL/hr      PRN medications  [Held by provider] acetaminophen, 650 mg, q4h PRN  morphine, 2 mg, q4h PRN  polyethylene glycol, 17 g, Daily PRN           Assessment/Plan   Principal Problem:    Comminuted left humeral fracture, closed, initial encounter  Active Problems:    Closed fracture of proximal end of left humerus with malunion, unspecified fracture morphology, subsequent encounter        Plan:  Admit to medical floor  Ortho consult in ER to Dr. Andrade.   Dr. Andrade states fracture is nonsurgical patient to wear sling.  Patient with complaint of bilateral hip pain and knee pain on assessment x-rays have been ordered  Initial potassium 6.9 sample hemolyzed repeat 3.6  Morphine ordered for pain  Patient to have sling on arm  Gentle IV hydration provided    VTE prophylaxis:   DVT prophylaxis: subcutaneous Lovenox    Medication reconciliation to be completed when home medications are verified by pharmacy.   See additional orders for further plan of care.   Further evaluation and management per attending and consulting physicians.      Code Status  Full code    I spent 45 minutes in the professional and overall care of this patient.      Tiffany Benitez, APRN-[facility]  Pager [phone]

## 2023-11-23 NOTE — CARE PLAN
Problem: Nutrition  Goal: Less than 5 days NPO/clear liquids  11/23/2023 1628 by Tony Richmond RN  Outcome: Progressing  11/23/2023 1626 by Tony Richmond RN  Outcome: Progressing  Goal: Oral intake greater than 50%  11/23/2023 1628 by Tony Richmond RN  Outcome: Progressing  11/23/2023 1626 by Tony Richmond RN  Outcome: Progressing  Goal: Oral intake greater 75%  11/23/2023 1628 by Tony Richmond RN  Outcome: Progressing  11/23/2023 1626 by Tony Richmond RN  Outcome: Progressing  Goal: Consume prescribed supplement  11/23/2023 1628 by Tony Richmond RN  Outcome: Progressing  11/23/2023 1626 by Tony Richmond RN  Outcome: Progressing  Goal: Adequate PO fluid intake  11/23/2023 1628 by Tony Richmond RN  Outcome: Progressing  11/23/2023 1626 by Tony Richmond RN  Outcome: Progressing  Goal: Nutrition support goals are met within 48 hrs  11/23/2023 1628 by Tony Richmond RN  Outcome: Progressing  11/23/2023 1626 by Tony Richmond RN  Outcome: Progressing  Goal: Nutrition support is meeting 75% of nutrient needs  11/23/2023 1628 by Tony Richmond RN  Outcome: Progressing  11/23/2023 1626 by Tony Richmond RN  Outcome: Progressing  Goal: Tube feed tolerance  11/23/2023 1628 by Tony Richmond RN  Outcome: Progressing  11/23/2023 1626 by Tony Richmond RN  Outcome: Progressing  Goal: BG  mg/dL  11/23/2023 1628 by Tony Richmond RN  Outcome: Progressing  11/23/2023 1626 by Tony Richmond RN  Outcome: Progressing  Goal: Lab values WNL  11/23/2023 1628 by Tony Richmond RN  Outcome: Progressing  11/23/2023 1626 by Tony Richmond RN  Outcome: Progressing  Goal: Electrolytes WNL  11/23/2023 1628 by Tony Richmond RN  Outcome: Progressing  11/23/2023 1626 by Tony Richmond RN  Outcome: Progressing  Goal: Promote healing  11/23/2023 1628 by Tony Richmond RN  Outcome: Progressing  11/23/2023 1626 by Tony Richmond  RN  Outcome: Progressing  Goal: Maintain stable weight  11/23/2023 1628 by Tony Richmond RN  Outcome: Progressing  11/23/2023 1626 by Tony Richmond RN  Outcome: Progressing  Goal: Reduce weight from edema/fluid  11/23/2023 1628 by Tony Richmond RN  Outcome: Progressing  11/23/2023 1626 by Tony Richmond RN  Outcome: Progressing  Goal: Gradual weight gain  11/23/2023 1628 by Tony Richmond RN  Outcome: Progressing  11/23/2023 1626 by Tony Richmond RN  Outcome: Progressing  Goal: Improve ostomy output  11/23/2023 1628 by Tony Richmond RN  Outcome: Progressing  11/23/2023 1626 by Tony Richmond RN  Outcome: Progressing     Problem: Skin  Goal: Decreased wound size/increased tissue granulation at next dressing change  Outcome: Progressing  Goal: Participates in plan/prevention/treatment measures  Outcome: Progressing  Goal: Prevent/manage excess moisture  Outcome: Progressing  Goal: Prevent/minimize sheer/friction injuries  Outcome: Progressing  Goal: Promote/optimize nutrition  Outcome: Progressing  Goal: Promote skin healing  Outcome: Progressing   The patient's goals for the shift include      The clinical goals for the shift include saftey/pain management

## 2023-11-23 NOTE — CARE PLAN
Problem: Nutrition  Goal: Less than 5 days NPO/clear liquids  Outcome: Progressing  Goal: Oral intake greater than 50%  Outcome: Progressing  Goal: Oral intake greater 75%  Outcome: Progressing  Goal: Consume prescribed supplement  Outcome: Progressing  Goal: Adequate PO fluid intake  Outcome: Progressing  Goal: Nutrition support goals are met within 48 hrs  Outcome: Progressing  Goal: Nutrition support is meeting 75% of nutrient needs  Outcome: Progressing  Goal: Tube feed tolerance  Outcome: Progressing  Goal: BG  mg/dL  Outcome: Progressing  Goal: Lab values WNL  Outcome: Progressing  Goal: Electrolytes WNL  Outcome: Progressing  Goal: Promote healing  Outcome: Progressing  Goal: Maintain stable weight  Outcome: Progressing  Goal: Reduce weight from edema/fluid  Outcome: Progressing  Goal: Gradual weight gain  Outcome: Progressing  Goal: Improve ostomy output  Outcome: Progressing   The patient's goals for the shift include      The clinical goals for the shift include saftey/pain management

## 2023-11-23 NOTE — ED TRIAGE NOTES
Patient states that she fell while in the bathroom around 0300. She went to pull herself up off the toilet but a towel was on it and she slipped and fell to the floor; denies LOC and thinners.  Pain to L shoulder

## 2023-11-23 NOTE — ED PROVIDER NOTES
HPI   No chief complaint on file.    This is a 89-year-old female with a history of  HTN, CAD, dyslipidemia, anxiety, DM 2, hypothyroidism, CKD, CVA (parieto-occipital  stroke) 5/7/2023 with LLE ED residual weakness presents to the emergency department status post fall.  The patient states she was at home at approximately 3 AM when she was just sitting on the toilet, was standing grabbing onto the side rail and slipped.  She states she hit her right head and her left shoulder and is having significant left shoulder pain.  She was on the ground for about 6 hours before her granddaughter found her.  The patient currently is endorsing just left shoulder pain, otherwise has no other concerns.  She is alert and oriented and denies headache, blurry vision, double vision, neck pain, back pain, chest pain, abdominal pain, leg pain.               No data recorded                Patient History   Past Medical History:   Diagnosis Date   • Abnormal weight loss     Weight loss   • Anxiety disorder, unspecified     Anxiety   • Cardiomegaly     LVH (left ventricular hypertrophy)   • Difficulty in walking, not elsewhere classified     Difficulty walking   • Disorder of arteries and arterioles, unspecified (CMS/HCC)     Carotid artery disease   • Essential (primary) hypertension 11/07/2013    Benign essential hypertension   • Hyperlipidemia, unspecified 11/07/2013    Hyperlipidemia   • Other symptoms and signs involving the musculoskeletal system     Limb weakness   • Personal history of other diseases of the circulatory system     Personal history of coronary atherosclerosis   • Personal history of other diseases of the digestive system     History of gastroesophageal reflux (GERD)   • Personal history of other diseases of the nervous system and sense organs     History of diplopia   • Personal history of other diseases of the respiratory system     History of chronic bronchitis   • Personal history of other endocrine, nutritional  and metabolic disease     History of diabetes mellitus   • Personal history of other mental and behavioral disorders     History of depression   • Personal history of other specified conditions     History of diarrhea   • Personal history of other venous thrombosis and embolism     History of deep venous thrombosis   • Stress incontinence (female) (male)     Stress incontinence     Past Surgical History:   Procedure Laterality Date   • CARDIAC SURGERY  11/19/2014    Heart Surgery   • CHOLECYSTECTOMY  11/19/2014    Cholecystectomy   • COLONOSCOPY  11/19/2014    Colonoscopy (Fiberoptic)   • CORONARY ARTERY BYPASS GRAFT  02/11/2014    CABG   • DILATION AND CURETTAGE OF UTERUS  02/11/2014    Dilation And Curettage   • LUMBAR LAMINECTOMY  03/04/2015    Laminectomy Lumbar   • MR HEAD ANGIO WO IV CONTRAST  2/18/2020    MR HEAD ANGIO WO IV CONTRAST 2/18/2020 [facility]   • MR NECK ANGIO WO IV CONTRAST  2/18/2020    MR NECK ANGIO WO IV CONTRAST 2/18/2020 [facility]   • OTHER SURGICAL HISTORY  10/26/2021    Bronchoscopy   • OTHER SURGICAL HISTORY  10/26/2021    Arterial stent placement   • OTHER SURGICAL HISTORY  10/26/2021    Back surgery   • OTHER SURGICAL HISTORY  10/26/2021    Cataract surgery   • OTHER SURGICAL HISTORY  10/26/2021    Coronary artery bypass graft   • OTHER SURGICAL HISTORY  10/26/2021    Pacemaker insertion   • OTHER SURGICAL HISTORY  10/26/2021    Esophagogastroduodenoscopy   • OTHER SURGICAL HISTORY  02/11/2014    Previous Stent Placement   • TONSILLECTOMY  11/19/2014    Tonsillectomy     No family history on file.  Social History     Tobacco Use   • Smoking status: Unknown   • Smokeless tobacco: Never   Substance Use Topics   • Alcohol use: Not Currently   • Drug use: Not Currently       Physical Exam   ED Triage Vitals   Temp Pulse Resp BP   -- -- -- --      SpO2 Temp src Heart Rate Source Patient Position   -- -- -- --      BP Location FiO2 (%)     -- --       Physical  Exam  Vitals reviewed.   Constitutional:       Appearance: Normal appearance.   HENT:      Head: Normocephalic.   Eyes:      Extraocular Movements: Extraocular movements intact.      Pupils: Pupils are equal, round, and reactive to light.   Cardiovascular:      Rate and Rhythm: Normal rate and regular rhythm.   Pulmonary:      Effort: Pulmonary effort is normal.   Abdominal:      Palpations: Abdomen is soft.      Tenderness: There is no abdominal tenderness.   Musculoskeletal:      Cervical back: Neck supple.      Comments: Significant tenderness in the left shoulder at the area of the anterior humeral head, unable to move the left shoulder or elbow, has decreased left  strength.  No midline spinal tenderness.   Neurological:      Mental Status: She is alert and oriented to person, place, and time. Mental status is at baseline.   Psychiatric:         Mood and Affect: Mood normal.         ED Course & MDM   ED Course as of 11/23/23 1216   Thu Nov 23, 2023   1107 XR chest 1 view [JK]      ED Course User Index  [JK] Osmar Dowell,          Diagnoses as of 11/23/23 1216   Closed fracture of proximal end of left humerus, unspecified fracture morphology, initial encounter       Medical Decision Making  89-year-old female, is alert and oriented x 3, afebrile and hemodynamically stable.  Presenting status post fall.    Examination reveals deformity in the left shoulder, unable to move the left shoulder, elbow, weak strength in the left wrist and .  Sensation diminished in the left upper extremity.  She has no spinal tenderness.    Granddaughter later came to visit the patient.  States the patient was on the ground and appeared flushed when she saw her.  An x-ray of the left shoulder, humerus, elbow, forearm was performed revealing a comminuted displaced left proximal humerus fracture.  CT of the head and C-spine were without acute injuries. Laboratory workup reveals an elevated hemoglobin of 16.6, likely  hemoconcentration in setting of possible dehydration.  Pain was optimally controlled with 2 mg of IV push morphine.  She was given a liter of LR.    Discussed the case with the orthopedic surgeon on-call, Dr Andrade, from [facility], who recommended no operation, applying a sling and follow-up at their clinic for reevaluation/repeat x-ray.    The patient lives on her own, though her daughter and granddaughter visits her frequently to help care for her.  She requires a walker or wheelchair to get around.      Discussed with the patient and her granddaughter that she will be unable to use her left shoulder and arm and this will hence severely limited her mobility and functional capacity.  Discussed potential options from hereon including possible skilled nursing facility placement.  The patient is in agreement with placement if needed.    Given such, decision was made to admit the patient for PT/OT/social work and plan for possible SNF placement.      Procedure  Procedures     Filiberto Champagne PA-C  11/23/23 [id number]

## 2023-11-24 LAB
ALBUMIN SERPL BCP-MCNC: 3.7 G/DL (ref 3.4–5)
ALP SERPL-CCNC: 101 U/L (ref 33–136)
ALT SERPL W P-5'-P-CCNC: 31 U/L (ref 7–45)
ANION GAP SERPL CALC-SCNC: 13 MMOL/L (ref 10–20)
AST SERPL W P-5'-P-CCNC: 28 U/L (ref 9–39)
BILIRUB SERPL-MCNC: 0.8 MG/DL (ref 0–1.2)
BUN SERPL-MCNC: 14 MG/DL (ref 6–23)
CALCIUM SERPL-MCNC: 8.9 MG/DL (ref 8.6–10.3)
CHLORIDE SERPL-SCNC: 104 MMOL/L (ref 98–107)
CO2 SERPL-SCNC: 22 MMOL/L (ref 21–32)
CREAT SERPL-MCNC: 0.51 MG/DL (ref 0.5–1.05)
ERYTHROCYTE [DISTWIDTH] IN BLOOD BY AUTOMATED COUNT: 15.5 % (ref 11.5–14.5)
GFR SERPL CREATININE-BSD FRML MDRD: 87 ML/MIN/1.73M*2
GLUCOSE BLD MANUAL STRIP-MCNC: 143 MG/DL (ref 74–99)
GLUCOSE BLD MANUAL STRIP-MCNC: 162 MG/DL (ref 74–99)
GLUCOSE BLD MANUAL STRIP-MCNC: 249 MG/DL (ref 74–99)
GLUCOSE SERPL-MCNC: 154 MG/DL (ref 74–99)
HCT VFR BLD AUTO: 42.2 % (ref 36–46)
HGB BLD-MCNC: 13.4 G/DL (ref 12–16)
MCH RBC QN AUTO: 28.5 PG (ref 26–34)
MCHC RBC AUTO-ENTMCNC: 31.8 G/DL (ref 32–36)
MCV RBC AUTO: 90 FL (ref 80–100)
NRBC BLD-RTO: 0 /100 WBCS (ref 0–0)
PLATELET # BLD AUTO: 193 X10*3/UL (ref 150–450)
POTASSIUM SERPL-SCNC: 3.5 MMOL/L (ref 3.5–5.3)
PROT SERPL-MCNC: 6.6 G/DL (ref 6.4–8.2)
RBC # BLD AUTO: 4.7 X10*6/UL (ref 4–5.2)
SODIUM SERPL-SCNC: 135 MMOL/L (ref 136–145)
WBC # BLD AUTO: 8 X10*3/UL (ref 4.4–11.3)

## 2023-11-24 PROCEDURE — 94760 N-INVAS EAR/PLS OXIMETRY 1: CPT

## 2023-11-24 PROCEDURE — 85027 COMPLETE CBC AUTOMATED: CPT | Performed by: NURSE PRACTITIONER

## 2023-11-24 PROCEDURE — 82435 ASSAY OF BLOOD CHLORIDE: CPT | Performed by: NURSE PRACTITIONER

## 2023-11-24 PROCEDURE — 97166 OT EVAL MOD COMPLEX 45 MIN: CPT | Mod: GO | Performed by: OCCUPATIONAL THERAPIST

## 2023-11-24 PROCEDURE — 2500000001 HC RX 250 WO HCPCS SELF ADMINISTERED DRUGS (ALT 637 FOR MEDICARE OP): Performed by: NURSE PRACTITIONER

## 2023-11-24 PROCEDURE — 97161 PT EVAL LOW COMPLEX 20 MIN: CPT | Mod: GP

## 2023-11-24 PROCEDURE — 2500000001 HC RX 250 WO HCPCS SELF ADMINISTERED DRUGS (ALT 637 FOR MEDICARE OP): Performed by: PHYSICIAN ASSISTANT

## 2023-11-24 PROCEDURE — 2500000004 HC RX 250 GENERAL PHARMACY W/ HCPCS (ALT 636 FOR OP/ED): Performed by: PHYSICIAN ASSISTANT

## 2023-11-24 PROCEDURE — 2500000001 HC RX 250 WO HCPCS SELF ADMINISTERED DRUGS (ALT 637 FOR MEDICARE OP): Performed by: INTERNAL MEDICINE

## 2023-11-24 PROCEDURE — 2500000004 HC RX 250 GENERAL PHARMACY W/ HCPCS (ALT 636 FOR OP/ED): Performed by: INTERNAL MEDICINE

## 2023-11-24 PROCEDURE — 36415 COLL VENOUS BLD VENIPUNCTURE: CPT | Performed by: NURSE PRACTITIONER

## 2023-11-24 PROCEDURE — G0378 HOSPITAL OBSERVATION PER HR: HCPCS

## 2023-11-24 PROCEDURE — 2500000004 HC RX 250 GENERAL PHARMACY W/ HCPCS (ALT 636 FOR OP/ED): Performed by: NURSE PRACTITIONER

## 2023-11-24 PROCEDURE — 97530 THERAPEUTIC ACTIVITIES: CPT | Mod: GP

## 2023-11-24 PROCEDURE — 2500000002 HC RX 250 W HCPCS SELF ADMINISTERED DRUGS (ALT 637 FOR MEDICARE OP, ALT 636 FOR OP/ED): Performed by: PHYSICIAN ASSISTANT

## 2023-11-24 PROCEDURE — 96376 TX/PRO/DX INJ SAME DRUG ADON: CPT

## 2023-11-24 PROCEDURE — 96372 THER/PROPH/DIAG INJ SC/IM: CPT | Performed by: NURSE PRACTITIONER

## 2023-11-24 PROCEDURE — 82947 ASSAY GLUCOSE BLOOD QUANT: CPT

## 2023-11-24 RX ORDER — POTASSIUM CHLORIDE 20 MEQ/1
20 TABLET, EXTENDED RELEASE ORAL ONCE
Status: COMPLETED | OUTPATIENT
Start: 2023-11-24 | End: 2023-11-24

## 2023-11-24 RX ORDER — METOPROLOL TARTRATE 50 MG/1
50 TABLET ORAL 2 TIMES DAILY
Status: DISCONTINUED | OUTPATIENT
Start: 2023-11-24 | End: 2023-11-24

## 2023-11-24 RX ADMIN — ALPRAZOLAM 0.12 MG: 0.25 TABLET ORAL at 09:22

## 2023-11-24 RX ADMIN — ENOXAPARIN SODIUM 40 MG: 40 INJECTION SUBCUTANEOUS at 15:38

## 2023-11-24 RX ADMIN — Medication 3 MG: at 18:57

## 2023-11-24 RX ADMIN — INSULIN LISPRO 1 UNITS: 100 INJECTION, SOLUTION INTRAVENOUS; SUBCUTANEOUS at 17:25

## 2023-11-24 RX ADMIN — ASPIRIN 81 MG: 81 TABLET, COATED ORAL at 09:22

## 2023-11-24 RX ADMIN — METOPROLOL TARTRATE 25 MG: 25 TABLET, FILM COATED ORAL at 09:22

## 2023-11-24 RX ADMIN — CHLORPROMAZINE HYDROCHLORIDE 10 MG: 10 TABLET, FILM COATED ORAL at 21:21

## 2023-11-24 RX ADMIN — MORPHINE SULFATE 2 MG: 2 INJECTION, SOLUTION INTRAMUSCULAR; INTRAVENOUS at 06:23

## 2023-11-24 RX ADMIN — ATORVASTATIN CALCIUM 80 MG: 80 TABLET, FILM COATED ORAL at 09:22

## 2023-11-24 RX ADMIN — SODIUM CHLORIDE 100 ML/HR: 9 INJECTION, SOLUTION INTRAVENOUS at 16:10

## 2023-11-24 RX ADMIN — LOSARTAN POTASSIUM 100 MG: 100 TABLET, FILM COATED ORAL at 09:22

## 2023-11-24 RX ADMIN — POTASSIUM CHLORIDE 20 MEQ: 1500 TABLET, EXTENDED RELEASE ORAL at 16:49

## 2023-11-24 RX ADMIN — INSULIN LISPRO 2 UNITS: 100 INJECTION, SOLUTION INTRAVENOUS; SUBCUTANEOUS at 14:39

## 2023-11-24 RX ADMIN — LEVOTHYROXINE SODIUM 100 MCG: 0.1 TABLET ORAL at 06:20

## 2023-11-24 RX ADMIN — ALPRAZOLAM 0.12 MG: 0.25 TABLET ORAL at 21:21

## 2023-11-24 RX ADMIN — FUROSEMIDE 20 MG: 20 TABLET ORAL at 21:21

## 2023-11-24 RX ADMIN — AMLODIPINE BESYLATE 10 MG: 10 TABLET ORAL at 09:22

## 2023-11-24 RX ADMIN — PANTOPRAZOLE SODIUM 20 MG: 20 TABLET, DELAYED RELEASE ORAL at 06:20

## 2023-11-24 RX ADMIN — METOPROLOL TARTRATE 75 MG: 50 TABLET, FILM COATED ORAL at 21:21

## 2023-11-24 ASSESSMENT — COGNITIVE AND FUNCTIONAL STATUS - GENERAL
TOILETING: A LITTLE
CLIMB 3 TO 5 STEPS WITH RAILING: TOTAL
MOVING TO AND FROM BED TO CHAIR: A LOT
HELP NEEDED FOR BATHING: A LOT
DRESSING REGULAR LOWER BODY CLOTHING: A LITTLE
CLIMB 3 TO 5 STEPS WITH RAILING: A LITTLE
TOILETING: A LOT
STANDING UP FROM CHAIR USING ARMS: A LITTLE
DRESSING REGULAR UPPER BODY CLOTHING: A LOT
WALKING IN HOSPITAL ROOM: A LITTLE
DRESSING REGULAR UPPER BODY CLOTHING: A LITTLE
MOVING FROM LYING ON BACK TO SITTING ON SIDE OF FLAT BED WITH BEDRAILS: A LOT
TURNING FROM BACK TO SIDE WHILE IN FLAT BAD: A LOT
PERSONAL GROOMING: A LITTLE
DRESSING REGULAR LOWER BODY CLOTHING: A LOT
MOBILITY SCORE: 11
TURNING FROM BACK TO SIDE WHILE IN FLAT BAD: A LITTLE
MOVING FROM LYING ON BACK TO SITTING ON SIDE OF FLAT BED WITH BEDRAILS: A LITTLE
EATING MEALS: A LITTLE
EATING MEALS: A LITTLE
STANDING UP FROM CHAIR USING ARMS: A LOT
PERSONAL GROOMING: A LITTLE
MOVING TO AND FROM BED TO CHAIR: A LITTLE
DAILY ACTIVITIY SCORE: 18
DAILY ACTIVITIY SCORE: 14
MOBILITY SCORE: 18
HELP NEEDED FOR BATHING: A LITTLE
WALKING IN HOSPITAL ROOM: A LOT

## 2023-11-24 ASSESSMENT — PAIN - FUNCTIONAL ASSESSMENT
PAIN_FUNCTIONAL_ASSESSMENT: 0-10

## 2023-11-24 ASSESSMENT — PAIN SCALES - GENERAL
PAINLEVEL_OUTOF10: 0 - NO PAIN
PAINLEVEL_OUTOF10: 0 - NO PAIN
PAINLEVEL_OUTOF10: 3
PAINLEVEL_OUTOF10: 8
PAINLEVEL_OUTOF10: 2
PAINLEVEL_OUTOF10: 2

## 2023-11-24 ASSESSMENT — PAIN DESCRIPTION - ORIENTATION: ORIENTATION: LEFT

## 2023-11-24 ASSESSMENT — PAIN DESCRIPTION - LOCATION: LOCATION: ARM

## 2023-11-24 ASSESSMENT — PAIN DESCRIPTION - DESCRIPTORS
DESCRIPTORS: ACHING;SORE

## 2023-11-24 NOTE — H&P
History Of Present Illness  Toshia Méndez is a 89 y.o. female presenting with past medical history of hypertension coronary artery disease type 2 diabetes chronic kidney disease stage III admitted to the hospital secondary to mechanical fall and landing on the left shoulder patient seen in emergency room x-ray consistent with left humerus fracture orthopedic was consulted and the plan to treat her conservatively considering her age patient having a hard time walking with a walker will work on placing her in a skilled nursing facility.     Past Medical History  She has a past medical history of Abnormal weight loss, Anxiety disorder, unspecified, Cardiomegaly, Difficulty in walking, not elsewhere classified, Disorder of arteries and arterioles, unspecified (CMS/[facility]), Essential (primary) hypertension (11/07/2013), Hyperlipidemia, unspecified (11/07/2013), Other symptoms and signs involving the musculoskeletal system, Personal history of other diseases of the circulatory system, Personal history of other diseases of the digestive system, Personal history of other diseases of the nervous system and sense organs, Personal history of other diseases of the respiratory system, Personal history of other endocrine, nutritional and metabolic disease, Personal history of other mental and behavioral disorders, Personal history of other specified conditions, Personal history of other venous thrombosis and embolism, and Stress incontinence (female) (male).    Surgical History  She has a past surgical history that includes Other surgical history (10/26/2021); Other surgical history (10/26/2021); Other surgical history (10/26/2021); Other surgical history (10/26/2021); Other surgical history (10/26/2021); Other surgical history (10/26/2021); Other surgical history (10/26/2021); Lumbar laminectomy (03/04/2015); Cholecystectomy (11/19/2014); Tonsillectomy (11/19/2014); Cardiac surgery (11/19/2014); Colonoscopy (11/19/2014);  Coronary artery bypass graft (02/11/2014); Other surgical history (02/11/2014); Dilation and curettage of uterus (02/11/2014); MR angio head wo IV contrast (2/18/2020); and MR angio neck wo IV contrast (2/18/2020).     Social History  She reports that she has never smoked. She has never used smokeless tobacco. She reports that she does not currently use alcohol. She reports that she does not currently use drugs.    Family History  No family history on file.     Allergies  Iodinated contrast media, Iodine, Sulfa (sulfonamide antibiotics), and Sulfamethoxazole-trimethoprim    Review of Systems   Constitutional:  Negative for diaphoresis and fatigue.   HENT:  Negative for ear pain, facial swelling, tinnitus and trouble swallowing.    Eyes:  Negative for photophobia and visual disturbance.   Respiratory:  Negative for choking and stridor.    Cardiovascular:  Negative for chest pain and palpitations.   Gastrointestinal:  Negative for abdominal pain, blood in stool and diarrhea.   Endocrine: Negative for cold intolerance, heat intolerance, polydipsia and polyuria.   Musculoskeletal:  Negative for back pain and joint swelling.   Skin:  Negative for color change and rash.   Allergic/Immunologic: Negative for food allergies.   Neurological:  Negative for tremors, facial asymmetry and weakness.   Psychiatric/Behavioral:  The patient is not hyperactive.       Physical Exam  HENT:      Right Ear: External ear normal.      Left Ear: External ear normal.      Mouth/Throat:      Mouth: Mucous membranes are moist.   Cardiovascular:      Rate and Rhythm: Normal rate and regular rhythm.      Heart sounds: No murmur heard.     No friction rub. No gallop.   Pulmonary:      Effort: No accessory muscle usage or respiratory distress.      Breath sounds: No stridor. No wheezing or rhonchi.   Chest:      Chest wall: No tenderness.   Abdominal:      General: There is no distension.      Palpations: There is no mass.      Tenderness: There is  "no abdominal tenderness. There is no guarding or rebound.   Musculoskeletal:      Limited range of motion over left shoulder  Skin:     Coloration: Skin is not jaundiced or pale.      Findings: No lesion.   Neurological:      General: No focal deficit present.      Mental Status: He is alert, oriented to person, place, and time and easily aroused.      Cranial Nerves: No cranial nerve deficit.      Sensory: No sensory deficit.      Motor: No weakness.        Last Recorded Vitals  Blood pressure 161/72, pulse 90, temperature 36.5 °C (97.7 °F), temperature source Temporal, resp. rate 17, height 1.574 m (5' 1.97\"), weight 67.9 kg (149 lb 11.1 oz), SpO2 95 %.    Labs    Admission on 11/23/2023   Component Date Value Ref Range Status    WBC 11/23/2023 14.5 (H)  4.4 - 11.3 x10*3/uL Final    nRBC 11/23/2023 0.0  0.0 - 0.0 /100 WBCs Final    RBC 11/23/2023 5.69 (H)  4.00 - 5.20 x10*6/uL Final    Hemoglobin 11/23/2023 16.6 (H)  12.0 - 16.0 g/dL Final    Hematocrit 11/23/2023 50.1 (H)  36.0 - 46.0 % Final    MCV 11/23/2023 88  80 - 100 fL Final    MCH 11/23/2023 29.2  26.0 - 34.0 pg Final    MCHC 11/23/2023 33.1  32.0 - 36.0 g/dL Final    RDW 11/23/2023 15.2 (H)  11.5 - 14.5 % Final    Platelets 11/23/2023 159  150 - 450 x10*3/uL Final    Neutrophils % 11/23/2023 91.1  40.0 - 80.0 % Final    Immature Granulocytes %, Automated 11/23/2023 0.4  0.0 - 0.9 % Final    Immature Granulocyte Count (IG) includes promyelocytes, myelocytes and metamyelocytes but does not include bands. Percent differential counts (%) should be interpreted in the context of the absolute cell counts (cells/UL).    Lymphocytes % 11/23/2023 4.0  13.0 - 44.0 % Final    Monocytes % 11/23/2023 4.2  2.0 - 10.0 % Final    Eosinophils % 11/23/2023 0.0  0.0 - 6.0 % Final    Basophils % 11/23/2023 0.3  0.0 - 2.0 % Final    Neutrophils Absolute 11/23/2023 13.15 (H)  1.60 - 5.50 x10*3/uL Final    Percent differential counts (%) should be interpreted in the context of " the absolute cell counts (cells/uL).    Immature Granulocytes Absolute, Au* 11/23/2023 0.06  0.00 - 0.50 x10*3/uL Final    Lymphocytes Absolute 11/23/2023 0.58 (L)  0.80 - 3.00 x10*3/uL Final    Monocytes Absolute 11/23/2023 0.61  0.05 - 0.80 x10*3/uL Final    Eosinophils Absolute 11/23/2023 0.00  0.00 - 0.40 x10*3/uL Final    Basophils Absolute 11/23/2023 0.05  0.00 - 0.10 x10*3/uL Final    Glucose 11/23/2023 265 (H)  74 - 99 mg/dL Final    Sodium 11/23/2023 137  136 - 145 mmol/L Final    Potassium 11/23/2023 6.9 (HH)  3.5 - 5.3 mmol/L Final    MARKED HEMOLYSIS DETECTED. The result may be falsely elevated due to hemolysis or other interferents. Clinical correlation is recommended. Repeat testing may be considered.    Chloride 11/23/2023 102  98 - 107 mmol/L Final    Bicarbonate 11/23/2023 21  21 - 32 mmol/L Final    Anion Gap 11/23/2023 21 (H)  10 - 20 mmol/L Final    Urea Nitrogen 11/23/2023 21  6 - 23 mg/dL Final    Creatinine 11/23/2023 0.73  0.50 - 1.05 mg/dL Final    eGFR 11/23/2023 76  >60 mL/min/1.73m*2 Final    Calculations of estimated GFR are performed using the 2021 CKD-EPI Study Refit equation without the race variable for the IDMS-Traceable creatinine methods.  https://jasn.asnjournals.org/content/early/2021/09/22/ASN.2204401353    Calcium 11/23/2023 10.0  8.6 - 10.3 mg/dL Final    Albumin 11/23/2023 4.8  3.4 - 5.0 g/dL Final    MARKED HEMOLYSIS DETECTED. The result may be falsely elevated due to hemolysis or other interferents. Clinical correlation is recommended. Repeat testing may be considered.    Alkaline Phosphatase 11/23/2023 145 (H)  33 - 136 U/L Final    MARKED HEMOLYSIS DETECTED. The result may be falsely decreased due to hemolysis or other interferents. Clinical correlation is recommended. Repeat testing may be considered.    Total Protein 11/23/2023 8.7 (H)  6.4 - 8.2 g/dL Final    AST 11/23/2023 77 (H)  9 - 39 U/L Final    MARKED HEMOLYSIS DETECTED. The result may be falsely elevated due  to hemolysis or other interferents. Clinical correlation is recommended. Repeat testing may be considered.    Bilirubin, Total 11/23/2023 0.8  0.0 - 1.2 mg/dL Final    ALT 11/23/2023 54 (H)  7 - 45 U/L Final    Patients treated with Sulfasalazine may generate falsely decreased results for ALT.    Creatine Kinase 11/23/2023 161  0 - 215 U/L Final    Color, Urine 11/23/2023 Straw  Straw, Yellow Final    Appearance, Urine 11/23/2023 Clear  Clear Final    Specific Gravity, Urine 11/23/2023 1.016  1.005 - 1.035 Final    pH, Urine 11/23/2023 6.0  5.0, 5.5, 6.0, 6.5, 7.0, 7.5, 8.0 Final    Protein, Urine 11/23/2023 NEGATIVE  NEGATIVE mg/dL Final    Glucose, Urine 11/23/2023 >=500 (3+) (A)  NEGATIVE mg/dL Final    Blood, Urine 11/23/2023 NEGATIVE  NEGATIVE Final    Ketones, Urine 11/23/2023 20 (1+) (A)  NEGATIVE mg/dL Final    Bilirubin, Urine 11/23/2023 NEGATIVE  NEGATIVE Final    Urobilinogen, Urine 11/23/2023 <2.0  <2.0 mg/dL Final    Nitrite, Urine 11/23/2023 NEGATIVE  NEGATIVE Final    Leukocyte Esterase, Urine 11/23/2023 NEGATIVE  NEGATIVE Final    Extra Tube 11/23/2023 Hold for add-ons.   Final    Auto resulted.    Potassium 11/23/2023 3.7  3.5 - 5.3 mmol/L Final    WBC 11/24/2023 8.0  4.4 - 11.3 x10*3/uL Final    nRBC 11/24/2023 0.0  0.0 - 0.0 /100 WBCs Final    RBC 11/24/2023 4.70  4.00 - 5.20 x10*6/uL Final    Hemoglobin 11/24/2023 13.4  12.0 - 16.0 g/dL Final    Hematocrit 11/24/2023 42.2  36.0 - 46.0 % Final    MCV 11/24/2023 90  80 - 100 fL Final    MCH 11/24/2023 28.5  26.0 - 34.0 pg Final    MCHC 11/24/2023 31.8 (L)  32.0 - 36.0 g/dL Final    RDW 11/24/2023 15.5 (H)  11.5 - 14.5 % Final    Platelets 11/24/2023 193  150 - 450 x10*3/uL Final    Glucose 11/24/2023 154 (H)  74 - 99 mg/dL Final    Sodium 11/24/2023 135 (L)  136 - 145 mmol/L Final    Potassium 11/24/2023 3.5  3.5 - 5.3 mmol/L Final    Chloride 11/24/2023 104  98 - 107 mmol/L Final    Bicarbonate 11/24/2023 22  21 - 32 mmol/L Final    Anion Gap  11/24/2023 13  10 - 20 mmol/L Final    Urea Nitrogen 11/24/2023 14  6 - 23 mg/dL Final    Creatinine 11/24/2023 0.51  0.50 - 1.05 mg/dL Final    eGFR 11/24/2023 87  >60 mL/min/1.73m*2 Final    Calculations of estimated GFR are performed using the 2021 CKD-EPI Study Refit equation without the race variable for the IDMS-Traceable creatinine methods.  https://jasn.asnjournals.org/content/early/2021/09/22/ASN.4968338557    Calcium 11/24/2023 8.9  8.6 - 10.3 mg/dL Final    Albumin 11/24/2023 3.7  3.4 - 5.0 g/dL Final    Alkaline Phosphatase 11/24/2023 101  33 - 136 U/L Final    Total Protein 11/24/2023 6.6  6.4 - 8.2 g/dL Final    AST 11/24/2023 28  9 - 39 U/L Final    Bilirubin, Total 11/24/2023 0.8  0.0 - 1.2 mg/dL Final    ALT 11/24/2023 31  7 - 45 U/L Final    Patients treated with Sulfasalazine may generate falsely decreased results for ALT.    POCT Glucose 11/24/2023 143 (H)  74 - 99 mg/dL Final         Imaging     XR hips bilateral 2 VW w pelvis when performed  Narrative: Interpreted By:  Dudley Lemon,   STUDY:  XR HIPS BILATERAL 2 VW WITH PELVIS WHEN PERFORMED;  11/23/2023 2:30 pm      INDICATION:  Signs/Symptoms:hip pain s/p fall.      COMPARISON:  None.      ACCESSION NUMBER(S):  [id number]      ORDERING CLINICIAN:  ANY REN      TECHNIQUE:  3 radiographs of the right hip and pelvis were performed.      FINDINGS:  The osseous structures are diffusely demineralized. The lower lumbar  spine and sacrum are obscured by bowel gas and fecal debris. There is  no plain film sign of acute fracture or dislocation. There is no bone  destruction or aggressive periosteal reaction.      There are moderate osteoarthritic changes of both hips and mild  osteoarthritic changes of both sacroiliac joints. There are  discogenic degenerative changes of the lower lumbar spine.      Impression: Osteoarthritic changes of both hips and sacroiliac joints.      Severe osteopenia.      No plain film sign of acute fracture or  dislocation. Detection for  nondisplaced fracture is somewhat limited by osteopenia.      Signed by: Dudley Lemon 11/23/2023 2:44 PM  Dictation workstation:   [id number]  XR knee right 4+ views  Narrative: Interpreted By:  Dudley Lemon,   STUDY:  XR KNEE RIGHT 4+ VIEWS;  11/23/2023 2:30 pm      INDICATION:  Signs/Symptoms:right knee pain s/p fall.      COMPARISON:  None.      ACCESSION NUMBER(S):  [id number]      ORDERING CLINICIAN:  ANY REN      TECHNIQUE:  4 radiographs of the right knee are performed.      FINDINGS:  The osseous structures are diffusely demineralized. There are  findings of tricompartmental joint space narrowing and marginal  osteophytes, greater in the medial compartment. There is  chondrocalcinosis visualized in the lateral compartment and likely  medial compartment. There is no plain film sign of acute fracture or  dislocation. There is no bone destruction or abnormal periosteal  reaction. There is a small suprapatellar joint effusion. There are  diffuse atherosclerotic arterial calcifications posteriorly.      Impression: Severe findings of tricompartmental osteoarthritis and/or CPPD  arthropathy.      Osteopenia.      No acute osseous abnormality.      Small joint effusion.      Signed by: Dudley Lemon 11/23/2023 2:34 PM  Dictation workstation:   [id number]  XR shoulder left 2+ views, XR humerus left, XR forearm left 2 views  Narrative: Interpreted By:  Rayne Cook,   STUDY:  XR SHOULDER LEFT 2+ VIEWS; XR FOREARM LEFT 2 VIEWS; XR HUMERUS LEFT;  11/23/2023 10:38 am      INDICATION:  Signs/Symptoms:L shoulder/elbow pain s/p fall; Signs/Symptoms:fall to  L shoulder/elbow.      COMPARISON:  None.      ACCESSION NUMBER(S):  [id number]; [id number]; [id number]      ORDERING CLINICIAN:  ELENA QUEZADA      FINDINGS:  Multiple views of the left shoulder, left humerus and left forearm  are obtained. There is a comminuted and displaced proximal left  humeral  fracture. No dislocation. The left forearm is is grossly  unremarkable.      Impression: Comminuted and displaced proximal left humeral fracture. No  dislocation. The left forearm is unremarkable.          Signed by: Rayne Cook 11/23/2023 10:44 AM  Dictation workstation:   [id number]  XR chest 1 view  Narrative: Interpreted By:  Rayne Cook,   STUDY:  XR CHEST 1 VIEW;  11/23/2023 10:38 am      INDICATION:  Signs/Symptoms:fall.      COMPARISON:  06/06/2023      ACCESSION NUMBER(S):  [id number]      ORDERING CLINICIAN:  ELENA QUEZADA      FINDINGS:  AP portable view of the chest is obtained.  Limited exam due to  portable nature. Magnified cardiac silhouette. Cardiac pacer. No  infiltrates. No effusion. No pneumothorax.      Displaced proximal left humeral fracture.      Impression: 1.  No evidence of acute cardiopulmonary process. Displaced proximal  left humeral fracture.              MACRO:  None      Signed by: Rayne Cook 11/23/2023 10:41 AM  Dictation workstation:   [id number]  CT cervical spine wo IV contrast  Narrative: Interpreted By:  Angelo Kiser,   STUDY:  CT CERVICAL SPINE WO IV CONTRAST;  11/23/2023 10:28 am      INDICATION:  Signs/Symptoms:fall head trauma.      COMPARISON:  CT scan dated 05/27/2023      ACCESSION NUMBER(S):  [id number]      ORDERING CLINICIAN:  ELENA QUEZADA      TECHNIQUE:  Axial CT images of the cervical spine are obtained. Axial, coronal  and sagittal reconstructions are provided for review.      FINDINGS:      Fractures: There is no evidence for an acute fracture of the cervical  spine.      Vertebral Alignment: Within normal limits.      Craniocervical Junction: Unchanged joint space loss with spurring in  the anterior superior atlantoaxial joint. Stable calcification of the  transverse ligament posterior to dens      Vertebrae/Disc Spaces:  The cervical vertebral body heights.  Multilevel spondylosis disc space narrowing. Unchanged multilevel  endplate spurring. Multilevel mild  central canal stenosis      Prevertebral/Paraspinal Soft Tissues: The prevertebral and paraspinal  soft tissues are unremarkable. Mild biapical pleural scarring.      Impression: 1. No acute cervical spine fracture or major malalignment.  2. Multilevel cervical spondylosis as in prior. No major central  canal compression.      MACRO:  None      Signed by: Angelo Kiser 11/23/2023 10:40 AM  Dictation workstation:   [id number]  CT head wo IV contrast  Narrative: Interpreted By:  Angelo Kiser,   STUDY:  CT HEAD WO IV CONTRAST;  11/23/2023 10:28 am      INDICATION:  fall to R head with L shoulder pain.      COMPARISON:  CT scan dated 06/11/2023      ACCESSION NUMBER(S):  [id number]      ORDERING CLINICIAN:  ELENA QUEZADA      TECHNIQUE:  Noncontrast axial CT scan of head was performed. Angled reformats in  brain and bone windows were generated. The images were reviewed in  bone, brain, blood and soft tissue windows.      FINDINGS:  CSF Spaces: The ventricles, sulci and basal cisterns are within  normal limits. There is no extraaxial fluid collection.      Parenchyma: Right posterior parieto-occipital CSF density area likely  related to prior infarct. Bilateral periventricular white matter  changes related to chronic microvascular ischemia. The grey-white  differentiation is intact. There is no mass effect or midline shift.  There is no intracranial hemorrhage.      Calvarium: The calvarium is unremarkable.      Paranasal sinuses and mastoids: Visualized paranasal sinuses and  mastoids are clear.      Impression: 1. No acute intracranial hemorrhage or serious brain herniation. No  depressed skull fracture.  2. 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      MACRO:  None      Signed by: Angelo Kiser 11/23/2023 10:35 AM  Dictation workstation:   [id number]       Patient Active Problem List   Diagnosis    Stage 3 chronic kidney disease, unspecified whether stage 3a or 3b CKD (CMS/HCC)    Anxiety    Arthritis    Atherosclerosis  of coronary artery    AV block    CHB (complete heart block) (CMS/[facility])    Depression with anxiety    Diabetes mellitus, type II (CMS/[facility])    Diabetic neuropathy (CMS/[facility])    Gout    Diabetes mellitus with coincident hypertension (CMS/[facility])    Hypothyroidism    Spinal stenosis, lumbar region, with neurogenic claudication    Multinodular goiter    Pacemaker    Diabetes mellitus type 2 with neurological manifestations (CMS/[facility])    DM type 2 with diabetic mixed hyperlipidemia (CMS/[facility])    Current mild episode of major depressive disorder (CMS/[facility])    Acute cerebrovascular accident (CMS/[facility])    Radiculopathy    Comminuted left humeral fracture, closed, initial encounter    Closed fracture of proximal end of left humerus with malunion, unspecified fracture morphology, subsequent encounter         Assessment/Plan   Principal Problem:    Comminuted left humeral fracture, closed, initial encounter  Active Problems:    Closed fracture of proximal end of left humerus with malunion, unspecified fracture morphology, subsequent encounter      Physical therapy was ordered on the patient orthopedic on consult patient need to have a sling over the left arm  History of chronic kidney disease continue to monitor currently stable  Hypertension blood pressure continue to be elevated I will adjust her medication history of diabetes continue to monitor  Hyperkalemia related to hemolysis repeating potassium was normal             DOROTHY Polo MD

## 2023-11-24 NOTE — PROGRESS NOTES
Physical Therapy                 Therapy Communication Note    Patient Name: Toshia Méndez  MRN: [id number]  Today's Date: 11/24/2023     Discipline: Physical Therapy    Missed Visit Reason: Missed Visit Reason:  (Pt with L proximal humerus fracture; awaiting ortho consult; order placed for sling but not yet in pt's room)    Missed Time: Attempt    Comment:  Will hold PT eval at this time and re-attempt pending POC.

## 2023-11-24 NOTE — CONSULTS
Reason For Consult  Left proximal humerus fracture    History Of Present Illness  Toshia Méndez is a 89 y.o. female presenting with fall sustaining a left proximal humerus fracture. She was subsequently admitted to the medicine service. I saw her this AM around 7 AM. She complains of pain to the shoulder and knee. Had not been out of bed as of this AM. Reports baseline numbness in LUE secondary to history of stroke.     Past Medical History  She has a past medical history of Abnormal weight loss, Anxiety disorder, unspecified, Cardiomegaly, Difficulty in walking, not elsewhere classified, Disorder of arteries and arterioles, unspecified (CMS/HCC), Essential (primary) hypertension (11/07/2013), Hyperlipidemia, unspecified (11/07/2013), Other symptoms and signs involving the musculoskeletal system, Personal history of other diseases of the circulatory system, Personal history of other diseases of the digestive system, Personal history of other diseases of the nervous system and sense organs, Personal history of other diseases of the respiratory system, Personal history of other endocrine, nutritional and metabolic disease, Personal history of other mental and behavioral disorders, Personal history of other specified conditions, Personal history of other venous thrombosis and embolism, and Stress incontinence (female) (male).    Surgical History  She has a past surgical history that includes Other surgical history (10/26/2021); Other surgical history (10/26/2021); Other surgical history (10/26/2021); Other surgical history (10/26/2021); Other surgical history (10/26/2021); Other surgical history (10/26/2021); Other surgical history (10/26/2021); Lumbar laminectomy (03/04/2015); Cholecystectomy (11/19/2014); Tonsillectomy (11/19/2014); Cardiac surgery (11/19/2014); Colonoscopy (11/19/2014); Coronary artery bypass graft (02/11/2014); Other surgical history (02/11/2014); Dilation and curettage of uterus (02/11/2014);  MR angio head wo IV contrast (2/18/2020); and MR angio neck wo IV contrast (2/18/2020).     Social History  She reports that she has never smoked. She has never used smokeless tobacco. She reports that she does not currently use alcohol. She reports that she does not currently use drugs.    Family History  No family history on file.     Allergies  Iodinated contrast media, Iodine, Sulfa (sulfonamide antibiotics), and Sulfamethoxazole-trimethoprim       Physical Exam  RUE:  Skin intact   No tenderness, deformity, step-off or crepitus noted about extremity  No pain with palpation or passive motion of the shoulder, elbow, forearm, wrist or hand  Sensation intact to light touch radial / median / ulnar nerve distributions  Anterior interosseous nerve / posterior interosseous nerve / and ulnar nerve motor function intact  Fingers warm and well perfused, palpable radial pulse  Compartments soft, no pain with passive stretch    LUE:  Skin intact, swelling to left upper arm  TTP left upper arm and shoulder otherwise no tenderness, deformity, step-off or crepitus noted about extremity  No pain with palpation or passive motion of the forearm, wrist or hand but pain to left shoulder with ROM of the lower joints  Sensation intact to light touch radial / median / ulnar nerve distributions though she describes it as duller than contralateral (at baseline)  Anterior interosseous nerve / posterior interosseous nerve / and ulnar nerve motor function intact  Fingers warm and well perfused, palpable radial pulse  Compartments soft, no pain with passive stretch    RLE:  Skin intact   Some joint line tenderness to palpation at the knee otherwise no tenderness, deformity, step-off or crepitus noted about extremity  No pain with palpation or passive motion of the hip, ankle or foot  No pain with hip log-rolling   Sensation intact to light touch deep peroneal / superficial peroneal / tibial / saphenous / sural nerve distributions  Intact  "EHL/FHL/TA/G motor function  Toes warm and well perfused, brisk capillary refill  Compartments soft, no pain with passive stretch    LLE:  Skin intact   Some joint line tenderness to palpation at the knee otherwise no tenderness, deformity, step-off or crepitus noted about extremity  No pain with palpation or passive motion of the hip, ankle or foot  No pain with hip log-rolling   Sensation intact to light touch deep peroneal / superficial peroneal / tibial / saphenous / sural nerve distributions  Intact EHL/FHL/TA/G motor function  Toes warm and well perfused, brisk capillary refill  Compartments soft, no pain with passive stretch       Last Recorded Vitals  Blood pressure 157/70, pulse 76, temperature 36.6 °C (97.9 °F), temperature source Temporal, resp. rate 18, height 1.574 m (5' 1.97\"), weight 67.9 kg (149 lb 11.1 oz), SpO2 94 %.    Relevant Results  XR hips bilateral 2 VW w pelvis when performed    Result Date: 11/23/2023  Interpreted By:  Dudley Lemon, STUDY: XR HIPS BILATERAL 2 VW WITH PELVIS WHEN PERFORMED;  11/23/2023 2:30 pm   INDICATION: Signs/Symptoms:hip pain s/p fall.   COMPARISON: None.   ACCESSION NUMBER(S): [id number]   ORDERING CLINICIAN: ANY REN   TECHNIQUE: 3 radiographs of the right hip and pelvis were performed.   FINDINGS: The osseous structures are diffusely demineralized. The lower lumbar spine and sacrum are obscured by bowel gas and fecal debris. There is no plain film sign of acute fracture or dislocation. There is no bone destruction or aggressive periosteal reaction.   There are moderate osteoarthritic changes of both hips and mild osteoarthritic changes of both sacroiliac joints. There are discogenic degenerative changes of the lower lumbar spine.       Osteoarthritic changes of both hips and sacroiliac joints.   Severe osteopenia.   No plain film sign of acute fracture or dislocation. Detection for nondisplaced fracture is somewhat limited by osteopenia.   Signed by: " Dudley Lemon 11/23/2023 2:44 PM Dictation workstation:   [id number]    XR knee right 4+ views    Result Date: 11/23/2023  Interpreted By:  Dudley Lemon, STUDY: XR KNEE RIGHT 4+ VIEWS;  11/23/2023 2:30 pm   INDICATION: Signs/Symptoms:right knee pain s/p fall.   COMPARISON: None.   ACCESSION NUMBER(S): [id number]   ORDERING CLINICIAN: ANY REN   TECHNIQUE: 4 radiographs of the right knee are performed.   FINDINGS: The osseous structures are diffusely demineralized. There are findings of tricompartmental joint space narrowing and marginal osteophytes, greater in the medial compartment. There is chondrocalcinosis visualized in the lateral compartment and likely medial compartment. There is no plain film sign of acute fracture or dislocation. There is no bone destruction or abnormal periosteal reaction. There is a small suprapatellar joint effusion. There are diffuse atherosclerotic arterial calcifications posteriorly.       Severe findings of tricompartmental osteoarthritis and/or CPPD arthropathy.   Osteopenia.   No acute osseous abnormality.   Small joint effusion.   Signed by: Dudley Lemon 11/23/2023 2:34 PM Dictation workstation:   [id number]    XR shoulder left 2+ views    Result Date: 11/23/2023  Interpreted By:  Rayne Cook, STUDY: XR SHOULDER LEFT 2+ VIEWS; XR FOREARM LEFT 2 VIEWS; XR HUMERUS LEFT; 11/23/2023 10:38 am   INDICATION: Signs/Symptoms:L shoulder/elbow pain s/p fall; Signs/Symptoms:fall to L shoulder/elbow.   COMPARISON: None.   ACCESSION NUMBER(S): [id number]; [id number]; [id number]   ORDERING CLINICIAN: ELENA QUEZADA   FINDINGS: Multiple views of the left shoulder, left humerus and left forearm are obtained. There is a comminuted and displaced proximal left humeral fracture. No dislocation. The left forearm is is grossly unremarkable.       Comminuted and displaced proximal left humeral fracture. No dislocation. The left forearm is unremarkable.     Signed by:  Rayne Cook 11/23/2023 10:44 AM Dictation workstation:   [id number]    XR humerus left    Result Date: 11/23/2023  Interpreted By:  Rayne Cook, STUDY: XR SHOULDER LEFT 2+ VIEWS; XR FOREARM LEFT 2 VIEWS; XR HUMERUS LEFT; 11/23/2023 10:38 am   INDICATION: Signs/Symptoms:L shoulder/elbow pain s/p fall; Signs/Symptoms:fall to L shoulder/elbow.   COMPARISON: None.   ACCESSION NUMBER(S): [id number]; [id number]; [id number]   ORDERING CLINICIAN: ELENA QUEZADA   FINDINGS: Multiple views of the left shoulder, left humerus and left forearm are obtained. There is a comminuted and displaced proximal left humeral fracture. No dislocation. The left forearm is is grossly unremarkable.       Comminuted and displaced proximal left humeral fracture. No dislocation. The left forearm is unremarkable.     Signed by: Rayne Cook 11/23/2023 10:44 AM Dictation workstation:   [id number]    XR forearm left 2 views    Result Date: 11/23/2023  Interpreted By:  Rayne Cook, STUDY: XR SHOULDER LEFT 2+ VIEWS; XR FOREARM LEFT 2 VIEWS; XR HUMERUS LEFT; 11/23/2023 10:38 am   INDICATION: Signs/Symptoms:L shoulder/elbow pain s/p fall; Signs/Symptoms:fall to L shoulder/elbow.   COMPARISON: None.   ACCESSION NUMBER(S): [id number]; [id number]; [id number]   ORDERING CLINICIAN: ELENA QUEZADA   FINDINGS: Multiple views of the left shoulder, left humerus and left forearm are obtained. There is a comminuted and displaced proximal left humeral fracture. No dislocation. The left forearm is is grossly unremarkable.       Comminuted and displaced proximal left humeral fracture. No dislocation. The left forearm is unremarkable.     Signed by: Rayne Cook 11/23/2023 10:44 AM Dictation workstation:   [id number]    XR chest 1 view    Result Date: 11/23/2023  Interpreted By:  Rayne Cook, STUDY: XR CHEST 1 VIEW;  11/23/2023 10:38 am   INDICATION: Signs/Symptoms:fall.   COMPARISON: 06/06/2023   ACCESSION NUMBER(S): [id number]   ORDERING CLINICIAN: ELENA  DAMIEN   FINDINGS: AP portable view of the chest is obtained.  Limited exam due to portable nature. Magnified cardiac silhouette. Cardiac pacer. No infiltrates. No effusion. No pneumothorax.   Displaced proximal left humeral fracture.       1.  No evidence of acute cardiopulmonary process. Displaced proximal left humeral fracture.       MACRO: None   Signed by: Rayne Cook 11/23/2023 10:41 AM Dictation workstation:   [id number]    CT cervical spine wo IV contrast    Result Date: 11/23/2023  Interpreted By:  Angelo Kiser, STUDY: CT CERVICAL SPINE WO IV CONTRAST;  11/23/2023 10:28 am   INDICATION: Signs/Symptoms:fall head trauma.   COMPARISON: CT scan dated 05/27/2023   ACCESSION NUMBER(S): [id number]   ORDERING CLINICIAN: ELENA QUEZADA   TECHNIQUE: Axial CT images of the cervical spine are obtained. Axial, coronal and sagittal reconstructions are provided for review.   FINDINGS:   Fractures: There is no evidence for an acute fracture of the cervical spine.   Vertebral Alignment: Within normal limits.   Craniocervical Junction: Unchanged joint space loss with spurring in the anterior superior atlantoaxial joint. Stable calcification of the transverse ligament posterior to dens   Vertebrae/Disc Spaces:  The cervical vertebral body heights. Multilevel spondylosis disc space narrowing. Unchanged multilevel endplate spurring. Multilevel mild central canal stenosis   Prevertebral/Paraspinal Soft Tissues: The prevertebral and paraspinal soft tissues are unremarkable. Mild biapical pleural scarring.       1. No acute cervical spine fracture or major malalignment. 2. Multilevel cervical spondylosis as in prior. No major central canal compression.   MACRO: None   Signed by: Angelo Kiser 11/23/2023 10:40 AM Dictation workstation:   [id number]    CT head wo IV contrast    Result Date: 11/23/2023  Interpreted By:  Angelo Kiser, STUDY: CT HEAD WO IV CONTRAST;  11/23/2023 10:28 am   INDICATION: fall to R head with L shoulder pain.    COMPARISON: CT scan dated 06/11/2023   ACCESSION NUMBER(S): [id number]   ORDERING CLINICIAN: ELENA QUEZADA   TECHNIQUE: Noncontrast axial CT scan of head was performed. Angled reformats in brain and bone windows were generated. The images were reviewed in bone, brain, blood and soft tissue windows.   FINDINGS: CSF Spaces: The ventricles, sulci and basal cisterns are within normal limits. There is no extraaxial fluid collection.   Parenchyma: Right posterior parieto-occipital CSF density area likely related to prior infarct. Bilateral periventricular white matter changes related to chronic microvascular ischemia. The grey-white differentiation is intact. There is no mass effect or midline shift. There is no intracranial hemorrhage.   Calvarium: The calvarium is unremarkable.   Paranasal sinuses and mastoids: Visualized paranasal sinuses and mastoids are clear.       1. No acute intracranial hemorrhage or serious brain herniation. No depressed skull fracture. 2. 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   MACRO: None   Signed by: Angelo Kiser 11/23/2023 10:35 AM Dictation workstation:   [id number]      Scheduled medications  ALPRAZolam, 0.125 mg, oral, BID  amLODIPine, 10 mg, oral, Daily  aspirin, 81 mg, oral, Daily  atorvastatin, 80 mg, oral, Daily  chlorproMAZINE, 10 mg, oral, Nightly  enoxaparin, 40 mg, subcutaneous, q24h  furosemide, 20 mg, oral, Every Mon/Wed/Fri  insulin lispro, 0-5 Units, subcutaneous, TID with meals  levothyroxine, 100 mcg, oral, Daily before breakfast  losartan, 100 mg, oral, Daily  melatonin, 3 mg, oral, Daily  metoprolol tartrate, 50 mg, oral, BID  pantoprazole, 20 mg, oral, Daily before breakfast  trifluoperazine, 2 mg, oral, Nightly      Continuous medications  sodium chloride 0.9%, 100 mL/hr, Last Rate: 100 mL/hr (11/23/23 1647)      PRN medications  PRN medications: [Held by provider] acetaminophen **OR** [DISCONTINUED] acetaminophen **OR** [DISCONTINUED] acetaminophen, dextrose 10 % in  water (D10W), dextrose, glucagon, morphine, polyethylene glycol  Results for orders placed or performed during the hospital encounter of 11/23/23 (from the past 24 hour(s))   CBC   Result Value Ref Range    WBC 8.0 4.4 - 11.3 x10*3/uL    nRBC 0.0 0.0 - 0.0 /100 WBCs    RBC 4.70 4.00 - 5.20 x10*6/uL    Hemoglobin 13.4 12.0 - 16.0 g/dL    Hematocrit 42.2 36.0 - 46.0 %    MCV 90 80 - 100 fL    MCH 28.5 26.0 - 34.0 pg    MCHC 31.8 (L) 32.0 - 36.0 g/dL    RDW 15.5 (H) 11.5 - 14.5 %    Platelets 193 150 - 450 x10*3/uL   Comprehensive metabolic panel   Result Value Ref Range    Glucose 154 (H) 74 - 99 mg/dL    Sodium 135 (L) 136 - 145 mmol/L    Potassium 3.5 3.5 - 5.3 mmol/L    Chloride 104 98 - 107 mmol/L    Bicarbonate 22 21 - 32 mmol/L    Anion Gap 13 10 - 20 mmol/L    Urea Nitrogen 14 6 - 23 mg/dL    Creatinine 0.51 0.50 - 1.05 mg/dL    eGFR 87 >60 mL/min/1.73m*2    Calcium 8.9 8.6 - 10.3 mg/dL    Albumin 3.7 3.4 - 5.0 g/dL    Alkaline Phosphatase 101 33 - 136 U/L    Total Protein 6.6 6.4 - 8.2 g/dL    AST 28 9 - 39 U/L    Bilirubin, Total 0.8 0.0 - 1.2 mg/dL    ALT 31 7 - 45 U/L   POCT GLUCOSE   Result Value Ref Range    POCT Glucose 143 (H) 74 - 99 mg/dL   POCT GLUCOSE   Result Value Ref Range    POCT Glucose 249 (H) 74 - 99 mg/dL        Assessment/Plan   I independently reviewed the xrays of the left humerus/shoulder/forearm, bilateral hips and right knee which show comminuted displaced left proximal humerus fracture, the head articulates with the glenoid, and degenerative changes to the hips and knee without fracture. I reviewed with the patient for the treatment of the left shoulder proximal humerus fracture I would recommend nonoperative management with use of sling and nonweightbearing to the LUE at this time. For the pain in the knee this is likely exacerbation of pain relating to the underlying knee arthritis. Recommend acetaminophen, topical medications such as lidocaine patches, and consideration for steroid  injection for the knees at the time of follow up in office if her pain is refractory. I will plan to see her back in 2 weeks after discharge from hospital.     MDM is moderate as this is an acute complicated injury requiring fracture care management and decision for non-operative treatment.    Kris Andrade MD

## 2023-11-24 NOTE — CARE PLAN
The patient's goals for the shift include      The clinical goals for the shift include Manage pain    All goals were met this shift.

## 2023-11-24 NOTE — PROGRESS NOTES
Occupational Therapy    Evaluation    Patient Name: Toshia Méndez  MRN: [id number]  Today's Date: 11/24/2023  Time Calculation  Start Time: 1315  Stop Time: 1335  Time Calculation (min): 20 min    Assessment  IP OT Assessment  Prognosis: Good  Evaluation/Treatment Tolerance: Patient tolerated treatment well  Medical Staff Made Aware: Yes    Plan:  Treatment Interventions: ADL retraining, UE strengthening/ROM, Equipment evaluation/education, Functional transfer training  OT Frequency: 3 times per week  OT Discharge Recommendations: Moderate intensity level of continued care  Equipment Recommended upon Discharge: Other (comment) (Pt has reachers and LH sponge)  OT - OK to Discharge: Yes    Subjective     Current Problem:  1. Closed fracture of proximal end of left humerus, unspecified fracture morphology, initial encounter            General:  General  Reason for Referral: S/p Fall with Lt comminuted humeral fx. Lt hip no fractures.  Co-Treatment: PT  Prior to Session Communication: Bedside nurse  Patient Position Received: Bed, 3 rail up  General Comment: Pt has chair check purewick  sling Lt arm when up NWB Lt arm. Pt does need verbal cues for safety and follows through    Precautions:  UE Weight Bearing Status: Left Non-Weight Bearing (sling on when up)  LE Weight Bearing Status: Weight Bearing as Tolerated  Medical Precautions: Fall precautions    Vital Signs:       Pain:  Pain Assessment  Pain Assessment: 0-10  Pain Score: 3  Pain Location: Shoulder  Pain Orientation:  (Lt. Lt arm in sling)  Pain Interventions: Other (Comment) (Pt is on morphine)    Objective     Cognition:  Overall Cognitive Status: Within Functional Limits             Home Living:  Type of Home: House  Lives With: Alone (Daughter and Grand Daughter help with cooking cleaning and errands)  Home Layout: Multi-level  Home Access: Stairs to enter with rails (1 then has stair lift to bed bath area)  Bathroom Shower/Tub: Walk-in shower  Bathroom  Equipment: Shower chair with back, Grab bars in shower     Prior Function:  Level of [place]: Independent with ADLs and functional transfers, Needs assistance with ADLs  Hand Dominance: Right    IADL History:  Homemaking Responsibilities: No  Meal Prep Responsibility:  (Daughter and Grand Daughter)  Mode of Transportation: Car, Family    ADL:  Eating Assistance: Stand by (witih set up)  Grooming Assistance: Stand by  UE Dressing Assistance: Moderate  LE Dressing Assistance: Maximal  Toileting Assistance with Device: Maximal  ADL Comments: Pt only able to use Rt arm   Lt arm in sling. P t needs Min A with donning doffing sling.    Activity Tolerance:  Endurance: Endurance does not limit participation in activity    Bed Mobility/Transfers:   Bed Mobility  Bed Mobility: Yes  Bed Mobility 1  Bed Mobility 1: Supine to sitting  Level of Assistance 1: Moderate assistance (x2  sling on Lt arm when up)  Transfers  Transfer: Yes  Transfer 1  Transfer From 1: Sit to  Transfer to 1: Stand  Transfer Level of Assistance 1: +2, Moderate assistance (arm in arm belt on)  Transfers 2  Transfer From 2: Bed to  Transfer to 2: Chair with arms  Technique 2: Stand pivot  Transfer Device 2:  (arm in arm of 2 Lt arm sling on)  Transfer Level of Assistance 2: +2, Moderate assistance    Ambulation/Gait Training:       Sitting Balance:       Standing Balance:       Vision: Vision - Basic Assessment  Current Vision: No visual deficits   and      Sensation:  Light Touch: No apparent deficits    Strength:       Perception:  Inattention/Neglect: Appears intact    Coordination:  Movements are Fluid and Coordinated: Yes     Hand Function:  Hand Function  Gross Grasp: Functional    Extremities: RUE   RUE : Within Functional Limits (4/5 strength) and LUE   LUE:  (no tested Lt arm sling on WNB LT arm)    Outcome Measures: [facility] Daily Activity  Putting on and taking off regular lower body clothing: A lot  Bathing (including washing, rinsing,  drying): A lot  Putting on and taking off regular upper body clothing: A lot  Toileting, which includes using toilet, bedpan or urinal: A lot  Taking care of personal grooming such as brushing teeth: A little  Eating Meals: A little  Daily Activity - Total Score: 14                    EDUCATION:  Education  Individual(s) Educated: Patient  Education Provided: Fall precautons, Risk and benefits of OT discussed with patient or other  Education Documentation  Precautions, taught by Juan Manuel Zarate OT at 11/24/2023  2:01 PM.  Learner: Patient  Readiness: Acceptance  Method: Explanation, Demonstration  Response: Verbalizes Understanding, Needs Reinforcement    Body Mechanics, taught by Juan Manuel Zarate OT at 11/24/2023  2:01 PM.  Learner: Patient  Readiness: Acceptance  Method: Explanation, Demonstration  Response: Verbalizes Understanding, Needs Reinforcement    Mobility Training, taught by Juan Manuel Zarate OT at 11/24/2023  2:01 PM.  Learner: Patient  Readiness: Acceptance  Method: Explanation, Demonstration  Response: Verbalizes Understanding, Needs Reinforcement    Education Comments  No comments found.        Goals:   Encounter Problems       Encounter Problems (Active)       Dressing Upper Extremities       STG -2  (Progressing)       Start:  11/24/23    Expected End:  12/08/23       Pt will perform UE dressing and donning doffing Lt arm sling with contact guard set up and cues.             Dressings Lower Extremities       STG - 1 (Progressing)       Start:  11/24/23    Expected End:  12/08/23       Pt will perform LE dressing with adaptive equipment prn Mod A of 1 with cues safely.             Eating       STG - 3 (Progressing)       Start:  11/24/23    Expected End:  12/08/23       Pt will use One handed technique to open containers and feed self meal with supervision set up.             Mobility       Goal 4 (Progressing)       Start:  11/24/23    Expected End:  12/08/23       Pt will perform standing tolerance  NWB LT arm Lt arm in sling when up Min A of 1-2 safely with appropriate assistive device for 5-10 minutes to assist with transfers.             Safety       LTG - Patient will adhere to hip precautions during ADL's and transfers (Progressing)       Start:  11/24/23            LTG - Patient will demonstrate safety requirements appropriate to situation/environment (Progressing)       Start:  11/24/23            LTG - Patient will utilize safety techniques (Progressing)       Start:  11/24/23            STG - Patient locks brakes on wheelchair (Progressing)       Start:  11/24/23            STG - Patient uses call light consistently to request assistance with transfers (Progressing)       Start:  11/24/23            STG - Patient uses gait belt during all transfers (Progressing)       Start:  11/24/23            Goal 1 (Progressing)       Start:  11/24/23            Goal 2 (Progressing)       Start:  11/24/23            Goal 3 (Progressing)       Start:  11/24/23               Safety       Goal 5 (Progressing)       Start:  11/24/23    Expected End:  12/08/23       Pt will be independent with NWB LT arm with verbal cues. Pt will perform Lt hand gripping with sponge daily for assist with ROM to Lt hand.             Toileting       STG - 6 (Progressing)       Start:  11/24/23    Expected End:  12/08/23       Pt will perform toileting tasks with Mod A of 1 safely.             Transfers       Goal 7 (Progressing)       Start:  11/24/23    Expected End:  12/08/23       Pt will transfer bed to chair/ commode with appropriate device safely with cues NWB LT arm Lt arm in sling with Min A of 1-2.

## 2023-11-24 NOTE — PROGRESS NOTES
Physical Therapy    Physical Therapy Evaluation    Patient Name: Toshia Méndez  MRN: [id number]  Today's Date: 11/24/2023   Time Calculation  Start Time: 1315  Stop Time: 1337  Time Calculation (min): 22 min  3111    Assessment/Plan   PT Assessment: Pt demonstrates decreased strength, increased pain, decreased activity tolerance, and impaired balance/mobility.  Based on current level of function, pt would benefit from continued skilled therapy while in the hospital to ensure safety, decrease risk of falls, and regains strength/mobility back to baseline.  Once stable enough for discharge, pt would benefit from moderate intensity therapy prior to returning home.   PT Assessment Results: Decreased strength, Decreased endurance, Impaired balance, Decreased mobility, Decreased safety awareness, Pain  Rehab Prognosis: Good  Evaluation/Treatment Tolerance: Patient limited by pain, Patient limited by fatigue  Medical Staff Made Aware: Yes  End of Session Communication: Bedside nurse (pt nauseated at EOS; RN alerted)  End of Session Patient Position: Up in chair, Alarm on  IP OR SWING BED PT PLAN  Inpatient or Swing Bed: Inpatient  PT Plan  Treatment/Interventions: Bed mobility, Transfer training, Gait training, Stair training, Balance training, Neuromuscular re-education, Strengthening, Range of motion, Therapeutic exercise, Therapeutic activity  PT Plan: Skilled PT  PT Frequency: 3 times per week  PT Discharge Recommendations: Moderate intensity level of continued care  Equipment Recommended upon Discharge: Straight cane  PT Recommended Transfer Status: Assist x2  PT - OK to Discharge: Yes - To next level of care when cleared by medical team    Subjective     Current Problem:  Patient Active Problem List   Diagnosis    Stage 3 chronic kidney disease, unspecified whether stage 3a or 3b CKD (CMS/[facility])    Anxiety    Arthritis    Atherosclerosis of coronary artery    AV block    CHB (complete heart block) (CMS/[facility])     Depression with anxiety    Diabetes mellitus, type II (CMS/[facility])    Diabetic neuropathy (CMS/HCC)    Gout    Diabetes mellitus with coincident hypertension (CMS/[facility])    Hypothyroidism    Spinal stenosis, lumbar region, with neurogenic claudication    Multinodular goiter    Pacemaker    Diabetes mellitus type 2 with neurological manifestations (CMS/HCC)    DM type 2 with diabetic mixed hyperlipidemia (CMS/[facility])    Current mild episode of major depressive disorder (CMS/[facility])    Acute cerebrovascular accident (CMS/[facility])    Radiculopathy    Comminuted left humeral fracture, closed, initial encounter    Closed fracture of proximal end of left humerus with malunion, unspecified fracture morphology, subsequent encounter       General Visit Information:  Per EMR: pt presents to the emergency department status post fall.  The patient states she was at home at approximately 3 AM when she was just sitting on the toilet, was standing grabbing onto the side rail and slipped.  She states she hit her right head and her left shoulder and is having significant left shoulder pain.  She was on the ground for about 6 hours before her granddaughter found her.  The patient currently is endorsing just left shoulder pain, otherwise has no other concerns.  She is alert and oriented and denies headache, blurry vision, double vision, neck pain, back pain, chest pain, abdominal pain, leg pain.   General: On arrival, pt supine in bed with sling on side table.  Pt in no apparent distress and agreeable to therapy.  Reason for Referral: impaired mobility  Referred By: DOROTHY Polo  Past Medical History Relevant to Rehab: (+) L proximal humerus fx  Missed Visit: Yes  Missed Visit Reason:  (Pt with L proximal humerus fracture; awaiting ortho consult; order placed for sling but not yet in pt's room)  Co-Treatment: OT  Prior to Session Communication: Bedside nurse  Patient Position Received: Bed, 3 rail up, Alarm on    Home Living/PLOF:  Pt lives alone  in split level home with 1 JERZY to kitchen level.  No half bath on main level.  Bedroom and full bath upstairs, but pt has stair left.  Pt does not use lower level.  Has WIS with chair and Gbs.  Mod I with mobility and ADLs using FWW.  Granddaughter does cooking, cleaning, and provide transportation.  Pt denies any other falls.     Precautions:  Precautions  Medical Precautions: Fall precautions  Precautions Comment:  (LUE NWB with sling donned)       Objective     Pain:  Pain Assessment  Pain Assessment: 0-10  Pain Score:  (pt reports moderate pain)    Cognition:  Cognition  Overall Cognitive Status: Within Functional Limits  Orientation Level: Oriented X4    General Assessments:      Activity Tolerance  Endurance: Tolerates less than 10 min exercise, no significant change in vital signs     Static Sitting Balance  Static Sitting-Comment/Number of Minutes: fair  Dynamic Sitting Balance  Dynamic Sitting-Comments: fair minus  Static Standing Balance  Static Standing-Comment/Number of Minutes: fair  Dynamic Standing Balance  Dynamic Standing-Comments: fair minus    Functional Assessments:  Bed Mobility   Supine to sit: Mod A x2; good maintenance of LUE NWB  Transfers  Sit to stand: Mod A x2; R arm in arm assist  Stand to sit: Mod A x2; R arm in arm assist  Ambulation/Gait Training  Pt ambulated 3 ft from bed to chair with Mod A x2 and E arm in arm assist; NBOS and slow movements; pt fearful of movement     Extremity/Trunk Assessments:  BLE strength/ROM: NT secondary to increased assist needed with dynamic sit balance    Outcome Measures:  [facility] Basic Mobility  Turning from your back to your side while in a flat bed without using bedrails: A lot  Moving from lying on your back to sitting on the side of a flat bed without using bedrails: A lot  Moving to and from bed to chair (including a wheelchair): A lot  Standing up from a chair using your arms (e.g. wheelchair or bedside chair): A lot  To walk in hospital room: A  lot  Climbing 3-5 steps with railing: Total  Basic Mobility - Total Score: 11    Goals:  Encounter Problems       Encounter Problems (Active)       PT Problem       Pt will be able to perform all bed mobility tasks with CGA-Min A while maintaining LUE NWB.  (Progressing)       Start:  11/24/23    Expected End:  12/08/23            Pt will perform all transfers with CGA-Min A and LRAD with proper safety mechanics while maintaining LUE NWB (Progressing)       Start:  11/24/23    Expected End:  12/08/23            Pt will ambulate 50 ft with Min A using LRAD for improved functional independence.  (Progressing)       Start:  11/24/23    Expected End:  12/08/23            Pt will be able to maintain LUE NWB precautions while performing functional mobility and tasks.  (Progressing)       Start:  11/24/23    Expected End:  12/08/23            Pt will be able to negotiate 1 steps with 1 HR with Min A.  (Progressing)       Start:  11/24/23    Expected End:  12/08/23                       Education Documentation  Precautions, taught by Elizabeth Abdi PT at 11/24/2023  1:52 PM.  Learner: Patient  Readiness: Acceptance  Method: Explanation  Response: Verbalizes Understanding, Needs Reinforcement    Body Mechanics, taught by Elizabeth Abdi, PT at 11/24/2023  1:52 PM.  Learner: Patient  Readiness: Acceptance  Method: Explanation  Response: Verbalizes Understanding, Needs Reinforcement    Mobility Training, taught by Elizabeth Abdi, PT at 11/24/2023  1:52 PM.  Learner: Patient  Readiness: Acceptance  Method: Explanation  Response: Verbalizes Understanding, Needs Reinforcement    Education Comments  No comments found.

## 2023-11-25 ENCOUNTER — APPOINTMENT (OUTPATIENT)
Dept: RADIOLOGY | Facility: HOSPITAL | Age: 88
End: 2023-11-25
Payer: MEDICARE

## 2023-11-25 LAB
ALBUMIN SERPL BCP-MCNC: 3.7 G/DL (ref 3.4–5)
ALP SERPL-CCNC: 100 U/L (ref 33–136)
ALT SERPL W P-5'-P-CCNC: 25 U/L (ref 7–45)
ANION GAP SERPL CALC-SCNC: 13 MMOL/L (ref 10–20)
AST SERPL W P-5'-P-CCNC: 21 U/L (ref 9–39)
BILIRUB SERPL-MCNC: 0.9 MG/DL (ref 0–1.2)
BUN SERPL-MCNC: 11 MG/DL (ref 6–23)
CALCIUM SERPL-MCNC: 9.2 MG/DL (ref 8.6–10.3)
CHLORIDE SERPL-SCNC: 104 MMOL/L (ref 98–107)
CO2 SERPL-SCNC: 23 MMOL/L (ref 21–32)
CREAT SERPL-MCNC: 0.51 MG/DL (ref 0.5–1.05)
ERYTHROCYTE [DISTWIDTH] IN BLOOD BY AUTOMATED COUNT: 15.4 % (ref 11.5–14.5)
GFR SERPL CREATININE-BSD FRML MDRD: 87 ML/MIN/1.73M*2
GLUCOSE BLD MANUAL STRIP-MCNC: 172 MG/DL (ref 74–99)
GLUCOSE BLD MANUAL STRIP-MCNC: 204 MG/DL (ref 74–99)
GLUCOSE BLD MANUAL STRIP-MCNC: 234 MG/DL (ref 74–99)
GLUCOSE BLD MANUAL STRIP-MCNC: 266 MG/DL (ref 74–99)
GLUCOSE SERPL-MCNC: 194 MG/DL (ref 74–99)
HCT VFR BLD AUTO: 44.8 % (ref 36–46)
HGB BLD-MCNC: 14.6 G/DL (ref 12–16)
MCH RBC QN AUTO: 29.2 PG (ref 26–34)
MCHC RBC AUTO-ENTMCNC: 32.6 G/DL (ref 32–36)
MCV RBC AUTO: 90 FL (ref 80–100)
NRBC BLD-RTO: 0 /100 WBCS (ref 0–0)
PLATELET # BLD AUTO: 210 X10*3/UL (ref 150–450)
POTASSIUM SERPL-SCNC: 3.6 MMOL/L (ref 3.5–5.3)
PROT SERPL-MCNC: 6.8 G/DL (ref 6.4–8.2)
RBC # BLD AUTO: 5 X10*6/UL (ref 4–5.2)
SODIUM SERPL-SCNC: 136 MMOL/L (ref 136–145)
WBC # BLD AUTO: 10.3 X10*3/UL (ref 4.4–11.3)

## 2023-11-25 PROCEDURE — 2500000001 HC RX 250 WO HCPCS SELF ADMINISTERED DRUGS (ALT 637 FOR MEDICARE OP): Performed by: NURSE PRACTITIONER

## 2023-11-25 PROCEDURE — 96376 TX/PRO/DX INJ SAME DRUG ADON: CPT

## 2023-11-25 PROCEDURE — 36415 COLL VENOUS BLD VENIPUNCTURE: CPT | Performed by: NURSE PRACTITIONER

## 2023-11-25 PROCEDURE — 2500000002 HC RX 250 W HCPCS SELF ADMINISTERED DRUGS (ALT 637 FOR MEDICARE OP, ALT 636 FOR OP/ED): Performed by: PHYSICIAN ASSISTANT

## 2023-11-25 PROCEDURE — 73564 X-RAY EXAM KNEE 4 OR MORE: CPT | Mod: LEFT SIDE | Performed by: RADIOLOGY

## 2023-11-25 PROCEDURE — 73610 X-RAY EXAM OF ANKLE: CPT | Mod: LT

## 2023-11-25 PROCEDURE — 2500000004 HC RX 250 GENERAL PHARMACY W/ HCPCS (ALT 636 FOR OP/ED): Performed by: NURSE PRACTITIONER

## 2023-11-25 PROCEDURE — 2500000001 HC RX 250 WO HCPCS SELF ADMINISTERED DRUGS (ALT 637 FOR MEDICARE OP): Performed by: INTERNAL MEDICINE

## 2023-11-25 PROCEDURE — 82947 ASSAY GLUCOSE BLOOD QUANT: CPT | Mod: 59

## 2023-11-25 PROCEDURE — 73610 X-RAY EXAM OF ANKLE: CPT | Mod: LEFT SIDE | Performed by: RADIOLOGY

## 2023-11-25 PROCEDURE — 80053 COMPREHEN METABOLIC PANEL: CPT | Performed by: NURSE PRACTITIONER

## 2023-11-25 PROCEDURE — 96372 THER/PROPH/DIAG INJ SC/IM: CPT | Performed by: NURSE PRACTITIONER

## 2023-11-25 PROCEDURE — 2500000001 HC RX 250 WO HCPCS SELF ADMINISTERED DRUGS (ALT 637 FOR MEDICARE OP): Performed by: PHYSICIAN ASSISTANT

## 2023-11-25 PROCEDURE — G0378 HOSPITAL OBSERVATION PER HR: HCPCS

## 2023-11-25 PROCEDURE — 73564 X-RAY EXAM KNEE 4 OR MORE: CPT | Mod: LT

## 2023-11-25 PROCEDURE — 2500000004 HC RX 250 GENERAL PHARMACY W/ HCPCS (ALT 636 FOR OP/ED): Performed by: PHYSICIAN ASSISTANT

## 2023-11-25 PROCEDURE — 85027 COMPLETE CBC AUTOMATED: CPT | Performed by: NURSE PRACTITIONER

## 2023-11-25 RX ORDER — MAGNESIUM HYDROXIDE 2400 MG/10ML
10 SUSPENSION ORAL DAILY PRN
Status: DISCONTINUED | OUTPATIENT
Start: 2023-11-25 | End: 2023-11-28 | Stop reason: HOSPADM

## 2023-11-25 RX ORDER — POLYETHYLENE GLYCOL 3350 17 G/17G
17 POWDER, FOR SOLUTION ORAL DAILY PRN
Start: 2023-11-25

## 2023-11-25 RX ORDER — METOPROLOL TARTRATE 75 MG/1
75 TABLET, FILM COATED ORAL 2 TIMES DAILY
Start: 2023-11-25

## 2023-11-25 RX ORDER — ACETAMINOPHEN 325 MG/1
650 TABLET ORAL EVERY 4 HOURS PRN
Qty: 30 TABLET | Refills: 0 | Status: SHIPPED | OUTPATIENT
Start: 2023-11-25 | End: 2024-01-12 | Stop reason: HOSPADM

## 2023-11-25 RX ADMIN — ACETAMINOPHEN 650 MG: 325 TABLET ORAL at 20:43

## 2023-11-25 RX ADMIN — PANTOPRAZOLE SODIUM 20 MG: 20 TABLET, DELAYED RELEASE ORAL at 06:20

## 2023-11-25 RX ADMIN — ALPRAZOLAM 0.12 MG: 0.25 TABLET ORAL at 09:21

## 2023-11-25 RX ADMIN — INSULIN LISPRO 3 UNITS: 100 INJECTION, SOLUTION INTRAVENOUS; SUBCUTANEOUS at 18:17

## 2023-11-25 RX ADMIN — ALPRAZOLAM 0.12 MG: 0.25 TABLET ORAL at 20:43

## 2023-11-25 RX ADMIN — MAGNESIUM HYDROXIDE 10 ML: 2400 SUSPENSION ORAL at 09:20

## 2023-11-25 RX ADMIN — Medication 3 MG: at 20:43

## 2023-11-25 RX ADMIN — ATORVASTATIN CALCIUM 80 MG: 80 TABLET, FILM COATED ORAL at 09:20

## 2023-11-25 RX ADMIN — ASPIRIN 81 MG: 81 TABLET, COATED ORAL at 09:21

## 2023-11-25 RX ADMIN — ENOXAPARIN SODIUM 40 MG: 40 INJECTION SUBCUTANEOUS at 16:10

## 2023-11-25 RX ADMIN — LOSARTAN POTASSIUM 100 MG: 100 TABLET, FILM COATED ORAL at 09:20

## 2023-11-25 RX ADMIN — INSULIN LISPRO 2 UNITS: 100 INJECTION, SOLUTION INTRAVENOUS; SUBCUTANEOUS at 09:07

## 2023-11-25 RX ADMIN — LEVOTHYROXINE SODIUM 100 MCG: 0.1 TABLET ORAL at 06:20

## 2023-11-25 RX ADMIN — METOPROLOL TARTRATE 75 MG: 50 TABLET, FILM COATED ORAL at 09:21

## 2023-11-25 RX ADMIN — MORPHINE SULFATE 2 MG: 2 INJECTION, SOLUTION INTRAMUSCULAR; INTRAVENOUS at 12:25

## 2023-11-25 RX ADMIN — INSULIN LISPRO 1 UNITS: 100 INJECTION, SOLUTION INTRAVENOUS; SUBCUTANEOUS at 12:01

## 2023-11-25 RX ADMIN — CHLORPROMAZINE HYDROCHLORIDE 10 MG: 10 TABLET, FILM COATED ORAL at 20:43

## 2023-11-25 RX ADMIN — METOPROLOL TARTRATE 75 MG: 50 TABLET, FILM COATED ORAL at 20:43

## 2023-11-25 RX ADMIN — AMLODIPINE BESYLATE 10 MG: 10 TABLET ORAL at 09:21

## 2023-11-25 RX ADMIN — SODIUM CHLORIDE 100 ML/HR: 9 INJECTION, SOLUTION INTRAVENOUS at 01:13

## 2023-11-25 ASSESSMENT — COGNITIVE AND FUNCTIONAL STATUS - GENERAL
WALKING IN HOSPITAL ROOM: A LOT
MOVING TO AND FROM BED TO CHAIR: A LOT
PERSONAL GROOMING: A LITTLE
DRESSING REGULAR LOWER BODY CLOTHING: A LOT
HELP NEEDED FOR BATHING: A LOT
DRESSING REGULAR UPPER BODY CLOTHING: A LOT
MOBILITY SCORE: 11
TOILETING: A LOT
MOVING FROM LYING ON BACK TO SITTING ON SIDE OF FLAT BED WITH BEDRAILS: A LOT
STANDING UP FROM CHAIR USING ARMS: A LOT
EATING MEALS: A LITTLE
DAILY ACTIVITIY SCORE: 14
TURNING FROM BACK TO SIDE WHILE IN FLAT BAD: A LOT
CLIMB 3 TO 5 STEPS WITH RAILING: TOTAL

## 2023-11-25 ASSESSMENT — PAIN SCALES - GENERAL
PAINLEVEL_OUTOF10: 7
PAINLEVEL_OUTOF10: 5 - MODERATE PAIN
PAINLEVEL_OUTOF10: 3
PAINLEVEL_OUTOF10: 3

## 2023-11-25 ASSESSMENT — PAIN DESCRIPTION - LOCATION: LOCATION: SHOULDER

## 2023-11-25 ASSESSMENT — PAIN DESCRIPTION - ORIENTATION
ORIENTATION: LEFT
ORIENTATION: LEFT

## 2023-11-25 ASSESSMENT — PAIN - FUNCTIONAL ASSESSMENT
PAIN_FUNCTIONAL_ASSESSMENT: 0-10
PAIN_FUNCTIONAL_ASSESSMENT: 0-10

## 2023-11-25 NOTE — DISCHARGE SUMMARY
Discharge Diagnosis  Comminuted left humeral fracture, closed, initial encounter    Issues Requiring Follow-Up  Discharged to skilled nursing facility    Discharge Meds     Your medication list        ASK your doctor about these medications        Instructions Last Dose Given Next Dose Due   ALPRAZolam 0.25 mg tablet  Commonly known as: Xanax           amLODIPine 10 mg tablet  Commonly known as: Norvasc           ascorbic acid 500 mg tablet  Commonly known as: Vitamin C           aspirin 81 mg EC tablet           atorvastatin 80 mg tablet  Commonly known as: Lipitor           biotin 5 mg capsule           chlorproMAZINE 10 mg tablet  Commonly known as: Thorazine           empagliflozin 10 mg  Commonly known as: Jardiance           folic acid 1 mg tablet  Commonly known as: Folvite           furosemide 20 mg tablet  Commonly known as: Lasix           glimepiride 4 mg tablet  Commonly known as: Amaryl           levothyroxine 100 mcg tablet  Commonly known as: Synthroid, Levoxyl           losartan 100 mg tablet  Commonly known as: Cozaar           metoprolol tartrate 25 mg tablet  Commonly known as: Lopressor           multivitamin with minerals tablet           omega-3 acid ethyl esters 1 gram capsule  Commonly known as: Lovaza           omeprazole 10 mg DR capsule  Commonly known as: PriLOSEC           trifluoperazine 2 mg tablet  Commonly known as: Stelazine                    Test Results Pending At Discharge  Pending Labs       No current pending labs.            Hospital Course   Was admitted to the hospital status post fall and fracture of the left humerus patient was seen by orthopedic and no surgical intervention patient had left shoulder immobilizer will discharge to skilled nursing with physical therapy and follow-up with orthopedic    Pertinent Physical Exam At Time of Discharge  Physical Exam  HENT:      Right Ear: External ear normal.      Left Ear: External ear normal.      Mouth/Throat:      Mouth:  Mucous membranes are moist.   Cardiovascular:      Rate and Rhythm: Normal rate and regular rhythm.      Heart sounds: No murmur heard.     No friction rub. No gallop.   Pulmonary:      Effort: No accessory muscle usage or respiratory distress.      Breath sounds: No stridor. No wheezing or rhonchi.   Chest:      Chest wall: No tenderness.   Abdominal:      General: There is no distension.      Palpations: There is no mass.      Tenderness: There is no abdominal tenderness. There is no guarding or rebound.   Musculoskeletal:         General: Tenderness present. No deformity or signs of injury.      Cervical back: No rigidity or tenderness. Normal range of motion.      Right lower leg: No edema.      Left lower leg: No edema.      Comments: Limited range of motion of the left shoulder and immobilizer over the left shoulder   Skin:     Coloration: Skin is not jaundiced or pale.      Findings: No lesion.   Neurological:      General: No focal deficit present.      Mental Status: She is alert, oriented to person, place, and time and easily aroused.      Cranial Nerves: No cranial nerve deficit.      Sensory: No sensory deficit.      Motor: No weakness.     11/27/2023 patient is doing well no change in condition still waiting for insurance approval to transfer to skilled nursing facility patient was examined blood work was reviewed  11/28/2023 patient is doing okayNo change in condition waiting for transfer to skilled nursing facility   Outpatient Follow-Up  Future Appointments   Date Time Provider Department Center   2/12/2024  2:00 PM Jessica Condon MD [id number] Tino Polo MD

## 2023-11-25 NOTE — CARE PLAN
The patient's goals for the shift include remain fall free    The clinical goals for the shift include remain afebrile

## 2023-11-26 LAB
ALBUMIN SERPL BCP-MCNC: 3.4 G/DL (ref 3.4–5)
ALP SERPL-CCNC: 79 U/L (ref 33–136)
ALT SERPL W P-5'-P-CCNC: 21 U/L (ref 7–45)
ANION GAP SERPL CALC-SCNC: 11 MMOL/L (ref 10–20)
AST SERPL W P-5'-P-CCNC: 20 U/L (ref 9–39)
BILIRUB SERPL-MCNC: 0.8 MG/DL (ref 0–1.2)
BUN SERPL-MCNC: 16 MG/DL (ref 6–23)
CALCIUM SERPL-MCNC: 9 MG/DL (ref 8.6–10.3)
CHLORIDE SERPL-SCNC: 104 MMOL/L (ref 98–107)
CO2 SERPL-SCNC: 25 MMOL/L (ref 21–32)
CREAT SERPL-MCNC: 0.49 MG/DL (ref 0.5–1.05)
ERYTHROCYTE [DISTWIDTH] IN BLOOD BY AUTOMATED COUNT: 15.2 % (ref 11.5–14.5)
GFR SERPL CREATININE-BSD FRML MDRD: 87 ML/MIN/1.73M*2
GLUCOSE BLD MANUAL STRIP-MCNC: 210 MG/DL (ref 74–99)
GLUCOSE BLD MANUAL STRIP-MCNC: 219 MG/DL (ref 74–99)
GLUCOSE BLD MANUAL STRIP-MCNC: 240 MG/DL (ref 74–99)
GLUCOSE SERPL-MCNC: 209 MG/DL (ref 74–99)
HCT VFR BLD AUTO: 40.1 % (ref 36–46)
HGB BLD-MCNC: 13.1 G/DL (ref 12–16)
MCH RBC QN AUTO: 28.9 PG (ref 26–34)
MCHC RBC AUTO-ENTMCNC: 32.7 G/DL (ref 32–36)
MCV RBC AUTO: 88 FL (ref 80–100)
NRBC BLD-RTO: 0 /100 WBCS (ref 0–0)
PLATELET # BLD AUTO: 210 X10*3/UL (ref 150–450)
POTASSIUM SERPL-SCNC: 3.7 MMOL/L (ref 3.5–5.3)
PROT SERPL-MCNC: 6.1 G/DL (ref 6.4–8.2)
RBC # BLD AUTO: 4.54 X10*6/UL (ref 4–5.2)
SODIUM SERPL-SCNC: 136 MMOL/L (ref 136–145)
WBC # BLD AUTO: 10.7 X10*3/UL (ref 4.4–11.3)

## 2023-11-26 PROCEDURE — 2500000002 HC RX 250 W HCPCS SELF ADMINISTERED DRUGS (ALT 637 FOR MEDICARE OP, ALT 636 FOR OP/ED): Performed by: PHYSICIAN ASSISTANT

## 2023-11-26 PROCEDURE — 2500000004 HC RX 250 GENERAL PHARMACY W/ HCPCS (ALT 636 FOR OP/ED): Performed by: PHYSICIAN ASSISTANT

## 2023-11-26 PROCEDURE — 96372 THER/PROPH/DIAG INJ SC/IM: CPT | Performed by: NURSE PRACTITIONER

## 2023-11-26 PROCEDURE — 2500000004 HC RX 250 GENERAL PHARMACY W/ HCPCS (ALT 636 FOR OP/ED): Performed by: NURSE PRACTITIONER

## 2023-11-26 PROCEDURE — 2500000001 HC RX 250 WO HCPCS SELF ADMINISTERED DRUGS (ALT 637 FOR MEDICARE OP): Performed by: NURSE PRACTITIONER

## 2023-11-26 PROCEDURE — 2500000001 HC RX 250 WO HCPCS SELF ADMINISTERED DRUGS (ALT 637 FOR MEDICARE OP): Performed by: PHYSICIAN ASSISTANT

## 2023-11-26 PROCEDURE — 80053 COMPREHEN METABOLIC PANEL: CPT | Performed by: NURSE PRACTITIONER

## 2023-11-26 PROCEDURE — 82947 ASSAY GLUCOSE BLOOD QUANT: CPT

## 2023-11-26 PROCEDURE — 36415 COLL VENOUS BLD VENIPUNCTURE: CPT | Performed by: NURSE PRACTITIONER

## 2023-11-26 PROCEDURE — G0378 HOSPITAL OBSERVATION PER HR: HCPCS

## 2023-11-26 PROCEDURE — 2500000001 HC RX 250 WO HCPCS SELF ADMINISTERED DRUGS (ALT 637 FOR MEDICARE OP): Performed by: INTERNAL MEDICINE

## 2023-11-26 PROCEDURE — 85027 COMPLETE CBC AUTOMATED: CPT | Performed by: NURSE PRACTITIONER

## 2023-11-26 RX ADMIN — ALPRAZOLAM 0.12 MG: 0.25 TABLET ORAL at 09:01

## 2023-11-26 RX ADMIN — INSULIN LISPRO 2 UNITS: 100 INJECTION, SOLUTION INTRAVENOUS; SUBCUTANEOUS at 17:02

## 2023-11-26 RX ADMIN — METOPROLOL TARTRATE 75 MG: 50 TABLET, FILM COATED ORAL at 09:02

## 2023-11-26 RX ADMIN — LEVOTHYROXINE SODIUM 100 MCG: 0.1 TABLET ORAL at 06:27

## 2023-11-26 RX ADMIN — AMLODIPINE BESYLATE 10 MG: 10 TABLET ORAL at 09:02

## 2023-11-26 RX ADMIN — LOSARTAN POTASSIUM 100 MG: 100 TABLET, FILM COATED ORAL at 09:02

## 2023-11-26 RX ADMIN — ATORVASTATIN CALCIUM 80 MG: 80 TABLET, FILM COATED ORAL at 09:02

## 2023-11-26 RX ADMIN — ACETAMINOPHEN 650 MG: 325 TABLET ORAL at 11:56

## 2023-11-26 RX ADMIN — INSULIN LISPRO 2 UNITS: 100 INJECTION, SOLUTION INTRAVENOUS; SUBCUTANEOUS at 12:26

## 2023-11-26 RX ADMIN — ASPIRIN 81 MG: 81 TABLET, COATED ORAL at 09:02

## 2023-11-26 RX ADMIN — Medication 3 MG: at 20:01

## 2023-11-26 RX ADMIN — CHLORPROMAZINE HYDROCHLORIDE 10 MG: 10 TABLET, FILM COATED ORAL at 20:01

## 2023-11-26 RX ADMIN — ENOXAPARIN SODIUM 40 MG: 40 INJECTION SUBCUTANEOUS at 17:02

## 2023-11-26 RX ADMIN — ALPRAZOLAM 0.12 MG: 0.25 TABLET ORAL at 20:01

## 2023-11-26 RX ADMIN — INSULIN LISPRO 2 UNITS: 100 INJECTION, SOLUTION INTRAVENOUS; SUBCUTANEOUS at 09:03

## 2023-11-26 RX ADMIN — PANTOPRAZOLE SODIUM 20 MG: 20 TABLET, DELAYED RELEASE ORAL at 06:27

## 2023-11-26 RX ADMIN — METOPROLOL TARTRATE 75 MG: 50 TABLET, FILM COATED ORAL at 20:01

## 2023-11-26 ASSESSMENT — COGNITIVE AND FUNCTIONAL STATUS - GENERAL
HELP NEEDED FOR BATHING: A LOT
PERSONAL GROOMING: A LOT
DRESSING REGULAR UPPER BODY CLOTHING: A LOT
MOVING TO AND FROM BED TO CHAIR: A LOT
WALKING IN HOSPITAL ROOM: A LOT
STANDING UP FROM CHAIR USING ARMS: A LOT
TOILETING: A LOT
DRESSING REGULAR LOWER BODY CLOTHING: A LOT
TURNING FROM BACK TO SIDE WHILE IN FLAT BAD: A LOT
MOVING FROM LYING ON BACK TO SITTING ON SIDE OF FLAT BED WITH BEDRAILS: A LOT
MOBILITY SCORE: 11
DAILY ACTIVITIY SCORE: 13
EATING MEALS: A LITTLE
CLIMB 3 TO 5 STEPS WITH RAILING: TOTAL

## 2023-11-26 ASSESSMENT — PAIN SCALES - GENERAL
PAINLEVEL_OUTOF10: 2
PAINLEVEL_OUTOF10: 5 - MODERATE PAIN

## 2023-11-26 ASSESSMENT — PAIN - FUNCTIONAL ASSESSMENT: PAIN_FUNCTIONAL_ASSESSMENT: 0-10

## 2023-11-26 NOTE — PROGRESS NOTES
"Subjective  Waiting to transfer to skilled nursing facility  Objectives    Last Recorded Vitals  Blood pressure 138/62, pulse 70, temperature 36.5 °C (97.7 °F), temperature source Temporal, resp. rate 17, height 1.574 m (5' 1.97\"), weight 67.9 kg (149 lb 11.1 oz), SpO2 97 %.    Physical Exam         Labs    Admission on 11/23/2023   Component Date Value Ref Range Status    WBC 11/23/2023 14.5 (H)  4.4 - 11.3 x10*3/uL Final    nRBC 11/23/2023 0.0  0.0 - 0.0 /100 WBCs Final    RBC 11/23/2023 5.69 (H)  4.00 - 5.20 x10*6/uL Final    Hemoglobin 11/23/2023 16.6 (H)  12.0 - 16.0 g/dL Final    Hematocrit 11/23/2023 50.1 (H)  36.0 - 46.0 % Final    MCV 11/23/2023 88  80 - 100 fL Final    MCH 11/23/2023 29.2  26.0 - 34.0 pg Final    MCHC 11/23/2023 33.1  32.0 - 36.0 g/dL Final    RDW 11/23/2023 15.2 (H)  11.5 - 14.5 % Final    Platelets 11/23/2023 159  150 - 450 x10*3/uL Final    Neutrophils % 11/23/2023 91.1  40.0 - 80.0 % Final    Immature Granulocytes %, Automated 11/23/2023 0.4  0.0 - 0.9 % Final    Immature Granulocyte Count (IG) includes promyelocytes, myelocytes and metamyelocytes but does not include bands. Percent differential counts (%) should be interpreted in the context of the absolute cell counts (cells/UL).    Lymphocytes % 11/23/2023 4.0  13.0 - 44.0 % Final    Monocytes % 11/23/2023 4.2  2.0 - 10.0 % Final    Eosinophils % 11/23/2023 0.0  0.0 - 6.0 % Final    Basophils % 11/23/2023 0.3  0.0 - 2.0 % Final    Neutrophils Absolute 11/23/2023 13.15 (H)  1.60 - 5.50 x10*3/uL Final    Percent differential counts (%) should be interpreted in the context of the absolute cell counts (cells/uL).    Immature Granulocytes Absolute, Au* 11/23/2023 0.06  0.00 - 0.50 x10*3/uL Final    Lymphocytes Absolute 11/23/2023 0.58 (L)  0.80 - 3.00 x10*3/uL Final    Monocytes Absolute 11/23/2023 0.61  0.05 - 0.80 x10*3/uL Final    Eosinophils Absolute 11/23/2023 0.00  0.00 - 0.40 x10*3/uL Final    Basophils Absolute 11/23/2023 0.05  " 0.00 - 0.10 x10*3/uL Final    Glucose 11/23/2023 265 (H)  74 - 99 mg/dL Final    Sodium 11/23/2023 137  136 - 145 mmol/L Final    Potassium 11/23/2023 6.9 (HH)  3.5 - 5.3 mmol/L Final    MARKED HEMOLYSIS DETECTED. The result may be falsely elevated due to hemolysis or other interferents. Clinical correlation is recommended. Repeat testing may be considered.    Chloride 11/23/2023 102  98 - 107 mmol/L Final    Bicarbonate 11/23/2023 21  21 - 32 mmol/L Final    Anion Gap 11/23/2023 21 (H)  10 - 20 mmol/L Final    Urea Nitrogen 11/23/2023 21  6 - 23 mg/dL Final    Creatinine 11/23/2023 0.73  0.50 - 1.05 mg/dL Final    eGFR 11/23/2023 76  >60 mL/min/1.73m*2 Final    Calculations of estimated GFR are performed using the 2021 CKD-EPI Study Refit equation without the race variable for the IDMS-Traceable creatinine methods.  https://jasn.asnjournals.org/content/early/2021/09/22/ASN.1039947911    Calcium 11/23/2023 10.0  8.6 - 10.3 mg/dL Final    Albumin 11/23/2023 4.8  3.4 - 5.0 g/dL Final    MARKED HEMOLYSIS DETECTED. The result may be falsely elevated due to hemolysis or other interferents. Clinical correlation is recommended. Repeat testing may be considered.    Alkaline Phosphatase 11/23/2023 145 (H)  33 - 136 U/L Final    MARKED HEMOLYSIS DETECTED. The result may be falsely decreased due to hemolysis or other interferents. Clinical correlation is recommended. Repeat testing may be considered.    Total Protein 11/23/2023 8.7 (H)  6.4 - 8.2 g/dL Final    AST 11/23/2023 77 (H)  9 - 39 U/L Final    MARKED HEMOLYSIS DETECTED. The result may be falsely elevated due to hemolysis or other interferents. Clinical correlation is recommended. Repeat testing may be considered.    Bilirubin, Total 11/23/2023 0.8  0.0 - 1.2 mg/dL Final    ALT 11/23/2023 54 (H)  7 - 45 U/L Final    Patients treated with Sulfasalazine may generate falsely decreased results for ALT.    Creatine Kinase 11/23/2023 161  0 - 215 U/L Final    Color, Urine  11/23/2023 Straw  Straw, Yellow Final    Appearance, Urine 11/23/2023 Clear  Clear Final    Specific Gravity, Urine 11/23/2023 1.016  1.005 - 1.035 Final    pH, Urine 11/23/2023 6.0  5.0, 5.5, 6.0, 6.5, 7.0, 7.5, 8.0 Final    Protein, Urine 11/23/2023 NEGATIVE  NEGATIVE mg/dL Final    Glucose, Urine 11/23/2023 >=500 (3+) (A)  NEGATIVE mg/dL Final    Blood, Urine 11/23/2023 NEGATIVE  NEGATIVE Final    Ketones, Urine 11/23/2023 20 (1+) (A)  NEGATIVE mg/dL Final    Bilirubin, Urine 11/23/2023 NEGATIVE  NEGATIVE Final    Urobilinogen, Urine 11/23/2023 <2.0  <2.0 mg/dL Final    Nitrite, Urine 11/23/2023 NEGATIVE  NEGATIVE Final    Leukocyte Esterase, Urine 11/23/2023 NEGATIVE  NEGATIVE Final    Extra Tube 11/23/2023 Hold for add-ons.   Final    Auto resulted.    Potassium 11/23/2023 3.7  3.5 - 5.3 mmol/L Final    WBC 11/24/2023 8.0  4.4 - 11.3 x10*3/uL Final    nRBC 11/24/2023 0.0  0.0 - 0.0 /100 WBCs Final    RBC 11/24/2023 4.70  4.00 - 5.20 x10*6/uL Final    Hemoglobin 11/24/2023 13.4  12.0 - 16.0 g/dL Final    Hematocrit 11/24/2023 42.2  36.0 - 46.0 % Final    MCV 11/24/2023 90  80 - 100 fL Final    MCH 11/24/2023 28.5  26.0 - 34.0 pg Final    MCHC 11/24/2023 31.8 (L)  32.0 - 36.0 g/dL Final    RDW 11/24/2023 15.5 (H)  11.5 - 14.5 % Final    Platelets 11/24/2023 193  150 - 450 x10*3/uL Final    Glucose 11/24/2023 154 (H)  74 - 99 mg/dL Final    Sodium 11/24/2023 135 (L)  136 - 145 mmol/L Final    Potassium 11/24/2023 3.5  3.5 - 5.3 mmol/L Final    Chloride 11/24/2023 104  98 - 107 mmol/L Final    Bicarbonate 11/24/2023 22  21 - 32 mmol/L Final    Anion Gap 11/24/2023 13  10 - 20 mmol/L Final    Urea Nitrogen 11/24/2023 14  6 - 23 mg/dL Final    Creatinine 11/24/2023 0.51  0.50 - 1.05 mg/dL Final    eGFR 11/24/2023 87  >60 mL/min/1.73m*2 Final    Calculations of estimated GFR are performed using the 2021 CKD-EPI Study Refit equation without the race variable for the IDMS-Traceable creatinine  methods.  https://jasn.asnjournals.org/content/early/2021/09/22/ASN.5941040905    Calcium 11/24/2023 8.9  8.6 - 10.3 mg/dL Final    Albumin 11/24/2023 3.7  3.4 - 5.0 g/dL Final    Alkaline Phosphatase 11/24/2023 101  33 - 136 U/L Final    Total Protein 11/24/2023 6.6  6.4 - 8.2 g/dL Final    AST 11/24/2023 28  9 - 39 U/L Final    Bilirubin, Total 11/24/2023 0.8  0.0 - 1.2 mg/dL Final    ALT 11/24/2023 31  7 - 45 U/L Final    Patients treated with Sulfasalazine may generate falsely decreased results for ALT.    POCT Glucose 11/24/2023 143 (H)  74 - 99 mg/dL Final    POCT Glucose 11/24/2023 249 (H)  74 - 99 mg/dL Final    POCT Glucose 11/24/2023 162 (H)  74 - 99 mg/dL Final    WBC 11/25/2023 10.3  4.4 - 11.3 x10*3/uL Final    nRBC 11/25/2023 0.0  0.0 - 0.0 /100 WBCs Final    RBC 11/25/2023 5.00  4.00 - 5.20 x10*6/uL Final    Hemoglobin 11/25/2023 14.6  12.0 - 16.0 g/dL Final    Hematocrit 11/25/2023 44.8  36.0 - 46.0 % Final    MCV 11/25/2023 90  80 - 100 fL Final    MCH 11/25/2023 29.2  26.0 - 34.0 pg Final    MCHC 11/25/2023 32.6  32.0 - 36.0 g/dL Final    RDW 11/25/2023 15.4 (H)  11.5 - 14.5 % Final    Platelets 11/25/2023 210  150 - 450 x10*3/uL Final    Glucose 11/25/2023 194 (H)  74 - 99 mg/dL Final    Sodium 11/25/2023 136  136 - 145 mmol/L Final    Potassium 11/25/2023 3.6  3.5 - 5.3 mmol/L Final    Chloride 11/25/2023 104  98 - 107 mmol/L Final    Bicarbonate 11/25/2023 23  21 - 32 mmol/L Final    Anion Gap 11/25/2023 13  10 - 20 mmol/L Final    Urea Nitrogen 11/25/2023 11  6 - 23 mg/dL Final    Creatinine 11/25/2023 0.51  0.50 - 1.05 mg/dL Final    eGFR 11/25/2023 87  >60 mL/min/1.73m*2 Final    Calculations of estimated GFR are performed using the 2021 CKD-EPI Study Refit equation without the race variable for the IDMS-Traceable creatinine methods.  https://jasn.asnjournals.org/content/early/2021/09/22/ASN.7825648993    Calcium 11/25/2023 9.2  8.6 - 10.3 mg/dL Final    Albumin 11/25/2023 3.7  3.4 - 5.0 g/dL  Final    Alkaline Phosphatase 11/25/2023 100  33 - 136 U/L Final    Total Protein 11/25/2023 6.8  6.4 - 8.2 g/dL Final    AST 11/25/2023 21  9 - 39 U/L Final    Bilirubin, Total 11/25/2023 0.9  0.0 - 1.2 mg/dL Final    ALT 11/25/2023 25  7 - 45 U/L Final    Patients treated with Sulfasalazine may generate falsely decreased results for ALT.    POCT Glucose 11/25/2023 204 (H)  74 - 99 mg/dL Final    POCT Glucose 11/25/2023 172 (H)  74 - 99 mg/dL Final    POCT Glucose 11/25/2023 266 (H)  74 - 99 mg/dL Final    WBC 11/26/2023 10.7  4.4 - 11.3 x10*3/uL Final    nRBC 11/26/2023 0.0  0.0 - 0.0 /100 WBCs Final    RBC 11/26/2023 4.54  4.00 - 5.20 x10*6/uL Final    Hemoglobin 11/26/2023 13.1  12.0 - 16.0 g/dL Final    Hematocrit 11/26/2023 40.1  36.0 - 46.0 % Final    MCV 11/26/2023 88  80 - 100 fL Final    MCH 11/26/2023 28.9  26.0 - 34.0 pg Final    MCHC 11/26/2023 32.7  32.0 - 36.0 g/dL Final    RDW 11/26/2023 15.2 (H)  11.5 - 14.5 % Final    Platelets 11/26/2023 210  150 - 450 x10*3/uL Final    Glucose 11/26/2023 209 (H)  74 - 99 mg/dL Final    Sodium 11/26/2023 136  136 - 145 mmol/L Final    Potassium 11/26/2023 3.7  3.5 - 5.3 mmol/L Final    Chloride 11/26/2023 104  98 - 107 mmol/L Final    Bicarbonate 11/26/2023 25  21 - 32 mmol/L Final    Anion Gap 11/26/2023 11  10 - 20 mmol/L Final    Urea Nitrogen 11/26/2023 16  6 - 23 mg/dL Final    Creatinine 11/26/2023 0.49 (L)  0.50 - 1.05 mg/dL Final    eGFR 11/26/2023 87  >60 mL/min/1.73m*2 Final    Calculations of estimated GFR are performed using the 2021 CKD-EPI Study Refit equation without the race variable for the IDMS-Traceable creatinine methods.  https://jasn.asnjournals.org/content/early/2021/09/22/ASN.1267534225    Calcium 11/26/2023 9.0  8.6 - 10.3 mg/dL Final    Albumin 11/26/2023 3.4  3.4 - 5.0 g/dL Final    Alkaline Phosphatase 11/26/2023 79  33 - 136 U/L Final    Total Protein 11/26/2023 6.1 (L)  6.4 - 8.2 g/dL Final    AST 11/26/2023 20  9 - 39 U/L Final     Bilirubin, Total 11/26/2023 0.8  0.0 - 1.2 mg/dL Final    ALT 11/26/2023 21  7 - 45 U/L Final    Patients treated with Sulfasalazine may generate falsely decreased results for ALT.    POCT Glucose 11/25/2023 234 (H)  74 - 99 mg/dL Final    POCT Glucose 11/26/2023 210 (H)  74 - 99 mg/dL Final    POCT Glucose 11/26/2023 240 (H)  74 - 99 mg/dL Final         Imaging     XR hips bilateral 2 VW w pelvis when performed  Narrative: Interpreted By:  Dudley Lemon,   STUDY:  XR HIPS BILATERAL 2 VW WITH PELVIS WHEN PERFORMED;  11/23/2023 2:30 pm      INDICATION:  Signs/Symptoms:hip pain s/p fall.      COMPARISON:  None.      ACCESSION NUMBER(S):  [id number]      ORDERING CLINICIAN:  ANY REN      TECHNIQUE:  3 radiographs of the right hip and pelvis were performed.      FINDINGS:  The osseous structures are diffusely demineralized. The lower lumbar  spine and sacrum are obscured by bowel gas and fecal debris. There is  no plain film sign of acute fracture or dislocation. There is no bone  destruction or aggressive periosteal reaction.      There are moderate osteoarthritic changes of both hips and mild  osteoarthritic changes of both sacroiliac joints. There are  discogenic degenerative changes of the lower lumbar spine.      Impression: Osteoarthritic changes of both hips and sacroiliac joints.      Severe osteopenia.      No plain film sign of acute fracture or dislocation. Detection for  nondisplaced fracture is somewhat limited by osteopenia.      Signed by: Dudley Lemon 11/23/2023 2:44 PM  Dictation workstation:   [id number]  XR knee right 4+ views  Narrative: Interpreted By:  Dudley Lemon,   STUDY:  XR KNEE RIGHT 4+ VIEWS;  11/23/2023 2:30 pm      INDICATION:  Signs/Symptoms:right knee pain s/p fall.      COMPARISON:  None.      ACCESSION NUMBER(S):  [id number]      ORDERING CLINICIAN:  ANY REN      TECHNIQUE:  4 radiographs of the right knee are performed.      FINDINGS:  The  osseous structures are diffusely demineralized. There are  findings of tricompartmental joint space narrowing and marginal  osteophytes, greater in the medial compartment. There is  chondrocalcinosis visualized in the lateral compartment and likely  medial compartment. There is no plain film sign of acute fracture or  dislocation. There is no bone destruction or abnormal periosteal  reaction. There is a small suprapatellar joint effusion. There are  diffuse atherosclerotic arterial calcifications posteriorly.      Impression: Severe findings of tricompartmental osteoarthritis and/or CPPD  arthropathy.      Osteopenia.      No acute osseous abnormality.      Small joint effusion.      Signed by: Dudley Lemon 11/23/2023 2:34 PM  Dictation workstation:   [id number]  XR shoulder left 2+ views, XR humerus left, XR forearm left 2 views  Narrative: Interpreted By:  Rayne Cook,   STUDY:  XR SHOULDER LEFT 2+ VIEWS; XR FOREARM LEFT 2 VIEWS; XR HUMERUS LEFT;  11/23/2023 10:38 am      INDICATION:  Signs/Symptoms:L shoulder/elbow pain s/p fall; Signs/Symptoms:fall to  L shoulder/elbow.      COMPARISON:  None.      ACCESSION NUMBER(S):  [id number]; [id number]; [id number]      ORDERING CLINICIAN:  ELENA QUEZADA      FINDINGS:  Multiple views of the left shoulder, left humerus and left forearm  are obtained. There is a comminuted and displaced proximal left  humeral fracture. No dislocation. The left forearm is is grossly  unremarkable.      Impression: Comminuted and displaced proximal left humeral fracture. No  dislocation. The left forearm is unremarkable.          Signed by: Rayne Cook 11/23/2023 10:44 AM  Dictation workstation:   [id number]  XR chest 1 view  Narrative: Interpreted By:  Rayne Cook,   STUDY:  XR CHEST 1 VIEW;  11/23/2023 10:38 am      INDICATION:  Signs/Symptoms:fall.      COMPARISON:  06/06/2023      ACCESSION NUMBER(S):  [id number]      ORDERING CLINICIAN:  ELENA QUEZADA      FINDINGS:  AP  portable view of the chest is obtained.  Limited exam due to  portable nature. Magnified cardiac silhouette. Cardiac pacer. No  infiltrates. No effusion. No pneumothorax.      Displaced proximal left humeral fracture.      Impression: 1.  No evidence of acute cardiopulmonary process. Displaced proximal  left humeral fracture.              MACRO:  None      Signed by: Rayne Cook 11/23/2023 10:41 AM  Dictation workstation:   [id number]  CT cervical spine wo IV contrast  Narrative: Interpreted By:  Angelo Kiser,   STUDY:  CT CERVICAL SPINE WO IV CONTRAST;  11/23/2023 10:28 am      INDICATION:  Signs/Symptoms:fall head trauma.      COMPARISON:  CT scan dated 05/27/2023      ACCESSION NUMBER(S):  [id number]      ORDERING CLINICIAN:  ELENA QUEZADA      TECHNIQUE:  Axial CT images of the cervical spine are obtained. Axial, coronal  and sagittal reconstructions are provided for review.      FINDINGS:      Fractures: There is no evidence for an acute fracture of the cervical  spine.      Vertebral Alignment: Within normal limits.      Craniocervical Junction: Unchanged joint space loss with spurring in  the anterior superior atlantoaxial joint. Stable calcification of the  transverse ligament posterior to dens      Vertebrae/Disc Spaces:  The cervical vertebral body heights.  Multilevel spondylosis disc space narrowing. Unchanged multilevel  endplate spurring. Multilevel mild central canal stenosis      Prevertebral/Paraspinal Soft Tissues: The prevertebral and paraspinal  soft tissues are unremarkable. Mild biapical pleural scarring.      Impression: 1. No acute cervical spine fracture or major malalignment.  2. Multilevel cervical spondylosis as in prior. No major central  canal compression.      MACRO:  None      Signed by: Angelo Kiser 11/23/2023 10:40 AM  Dictation workstation:   [id number]  CT head wo IV contrast  Narrative: Interpreted By:  Angelo Kiser,   STUDY:  CT HEAD WO IV CONTRAST;  11/23/2023 10:28 am       INDICATION:  fall to R head with L shoulder pain.      COMPARISON:  CT scan dated 06/11/2023      ACCESSION NUMBER(S):  [id number]      ORDERING CLINICIAN:  ELENA QUEZADA      TECHNIQUE:  Noncontrast axial CT scan of head was performed. Angled reformats in  brain and bone windows were generated. The images were reviewed in  bone, brain, blood and soft tissue windows.      FINDINGS:  CSF Spaces: The ventricles, sulci and basal cisterns are within  normal limits. There is no extraaxial fluid collection.      Parenchyma: Right posterior parieto-occipital CSF density area likely  related to prior infarct. Bilateral periventricular white matter  changes related to chronic microvascular ischemia. The grey-white  differentiation is intact. There is no mass effect or midline shift.  There is no intracranial hemorrhage.      Calvarium: The calvarium is unremarkable.      Paranasal sinuses and mastoids: Visualized paranasal sinuses and  mastoids are clear.      Impression: 1. No acute intracranial hemorrhage or serious brain herniation. No  depressed skull fracture.  2. 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      MACRO:  None      Signed by: Angelo Kisre 11/23/2023 10:35 AM  Dictation workstation:   [id number]       Patient Active Problem List   Diagnosis    Stage 3 chronic kidney disease, unspecified whether stage 3a or 3b CKD (CMS/HCC)    Anxiety    Arthritis    Atherosclerosis of coronary artery    AV block    CHB (complete heart block) (CMS/HCC)    Depression with anxiety    Diabetes mellitus, type II (CMS/HCC)    Diabetic neuropathy (CMS/HCC)    Gout    Diabetes mellitus with coincident hypertension (CMS/HCC)    Hypothyroidism    Spinal stenosis, lumbar region, with neurogenic claudication    Multinodular goiter    Pacemaker    Diabetes mellitus type 2 with neurological manifestations (CMS/HCC)    DM type 2 with diabetic mixed hyperlipidemia (CMS/HCC)    Current mild episode of major depressive disorder (CMS/HCC)     Acute cerebrovascular accident (CMS/[facility])    Radiculopathy    Comminuted left humeral fracture, closed, initial encounter    Closed fracture of proximal end of left humerus with malunion, unspecified fracture morphology, subsequent encounter         Assessment/Plan   Principal Problem:    Comminuted left humeral fracture, closed, initial encounter  Active Problems:    Closed fracture of proximal end of left humerus with malunion, unspecified fracture morphology, subsequent encounter      Waiting to transfer to skilled nursing facility blood work was reviewed vital signs reviewed             DOROTHY Polo MD

## 2023-11-26 NOTE — CARE PLAN
The patient's goals for the shift include      The clinical goals for the shift include Pt will be hemodynamically stable throughout this shift    Problem: Fall/Injury  Goal: Verbalize understanding of personal risk factors for fall in the hospital  Outcome: Progressing  Goal: Verbalize understanding of risk factor reduction measures to prevent injury from fall in the home  Outcome: Progressing  Goal: Use assistive devices by end of the shift  Outcome: Progressing  Goal: Pace activities to prevent fatigue by end of the shift  Outcome: Progressing     Problem: Pain  Goal: Takes deep breaths with improved pain control throughout the shift  Outcome: Progressing  Goal: Turns in bed with improved pain control throughout the shift  Outcome: Progressing  Goal: Walks with improved pain control throughout the shift  Outcome: Progressing  Goal: Performs ADL's with improved pain control throughout shift  Outcome: Progressing  Goal: Participates in PT with improved pain control throughout the shift  Outcome: Progressing  Goal: Free from opioid side effects throughout the shift  Outcome: Progressing  Goal: Free from acute confusion related to pain meds throughout the shift  Outcome: Progressing     Problem: Pain  Goal: My pain/discomfort is manageable  Outcome: Progressing     Problem: Safety  Goal: Patient will be injury free during hospitalization  Outcome: Progressing  Goal: I will remain free of falls  Outcome: Progressing     Problem: Daily Care  Goal: Daily care needs are met  Outcome: Progressing     Problem: Psychosocial Needs  Goal: Demonstrates ability to cope with hospitalization/illness  Outcome: Progressing  Goal: Collaborate with me, my family, and caregiver to identify my specific goals  Outcome: Progressing     Problem: Discharge Barriers  Goal: My discharge needs are met  Outcome: Progressing     Problem: Skin  Goal: Decreased wound size/increased tissue granulation at next dressing change  Outcome:  Progressing  Goal: Participates in plan/prevention/treatment measures  Outcome: Progressing  Goal: Prevent/manage excess moisture  Outcome: Progressing  Goal: Prevent/minimize sheer/friction injuries  Outcome: Progressing  Goal: Promote/optimize nutrition  Outcome: Progressing  Goal: Promote skin healing  Outcome: Progressing     Problem: Fall/Injury  Goal: Not fall by end of shift  Outcome: Met  Goal: Be free from injury by end of the shift  Outcome: Met

## 2023-11-26 NOTE — PROGRESS NOTES
11/26/23 1236   Discharge Planning   Living Arrangements Alone   Support Systems Family members   Assistance Needed yes, with ADLS, ambulation, and meal prep   Type of Residence Private residence   Number of Stairs to Enter Residence 4   Number of Stairs Within Residence 11  (Patient has a lift chair)   Do you have animals or pets at home? No   Home or Post Acute Services None   Patient expects to be discharged to: SNF   Does the patient need discharge transport arranged? Yes   RoundTrip coordination needed? Yes        11/26/23 1236   Discharge Planning   Living Arrangements Alone   Support Systems Family members   Assistance Needed yes, with ADLS, ambulation, and meal prep   Type of Residence Private residence   Number of Stairs to Enter Residence 4   Number of Stairs Within Residence 11  (Patient has a lift chair)   Do you have animals or pets at home? No   Home or Post Acute Services None   Patient expects to be discharged to: SNF   Does the patient need discharge transport arranged? Yes   RoundTrip coordination needed? Yes     Patient lives alone in a split level home with a lift chair system to upstairs where her bedroom and shower is located. Her granddaughters provide additional in home care with meals, housekeeping, and transportation.  She was using a walker prior to this fall but will not be able when discharged related to the fracture left shoulder/arm. Therapy's evaluation is recommending a moderate intensity therapy /skilled care before returning home. The daughterJusten preference for a SNF is [facility]. Referral sent through all scripts. She will need a pre cert when accepted.

## 2023-11-26 NOTE — CARE PLAN
The patient's goals for the shift include      The clinical goals for the shift include Pt will be hemodynamically stable throughout this shift    Problem: Skin  Goal: Decreased wound size/increased tissue granulation at next dressing change  Outcome: Progressing  Goal: Participates in plan/prevention/treatment measures  Outcome: Progressing  Goal: Prevent/manage excess moisture  Outcome: Progressing  Goal: Prevent/minimize sheer/friction injuries  Outcome: Progressing  Goal: Promote/optimize nutrition  Outcome: Progressing  Goal: Promote skin healing  Outcome: Progressing     Problem: Fall/Injury  Goal: Verbalize understanding of personal risk factors for fall in the hospital  Outcome: Progressing  Goal: Verbalize understanding of risk factor reduction measures to prevent injury from fall in the home  Outcome: Progressing  Goal: Use assistive devices by end of the shift  Outcome: Progressing  Goal: Pace activities to prevent fatigue by end of the shift  Outcome: Progressing     Problem: Pain  Goal: Takes deep breaths with improved pain control throughout the shift  Outcome: Progressing  Goal: Turns in bed with improved pain control throughout the shift  Outcome: Progressing  Goal: Walks with improved pain control throughout the shift  Outcome: Progressing  Goal: Performs ADL's with improved pain control throughout shift  Outcome: Progressing  Goal: Participates in PT with improved pain control throughout the shift  Outcome: Progressing  Goal: Free from opioid side effects throughout the shift  Outcome: Progressing  Goal: Free from acute confusion related to pain meds throughout the shift  Outcome: Progressing     Problem: Pain  Goal: My pain/discomfort is manageable  Outcome: Progressing     Problem: Psychosocial Needs  Goal: Demonstrates ability to cope with hospitalization/illness  Outcome: Progressing  Goal: Collaborate with me, my family, and caregiver to identify my specific goals  Outcome: Progressing      Problem: Discharge Barriers  Goal: My discharge needs are met  Outcome: Progressing

## 2023-11-27 LAB
ALBUMIN SERPL BCP-MCNC: 3.1 G/DL (ref 3.4–5)
ALP SERPL-CCNC: 76 U/L (ref 33–136)
ALT SERPL W P-5'-P-CCNC: 19 U/L (ref 7–45)
ANION GAP SERPL CALC-SCNC: 11 MMOL/L (ref 10–20)
AST SERPL W P-5'-P-CCNC: 17 U/L (ref 9–39)
BILIRUB SERPL-MCNC: 0.8 MG/DL (ref 0–1.2)
BUN SERPL-MCNC: 16 MG/DL (ref 6–23)
CALCIUM SERPL-MCNC: 8.8 MG/DL (ref 8.6–10.3)
CHLORIDE SERPL-SCNC: 104 MMOL/L (ref 98–107)
CO2 SERPL-SCNC: 24 MMOL/L (ref 21–32)
CREAT SERPL-MCNC: 0.47 MG/DL (ref 0.5–1.05)
ERYTHROCYTE [DISTWIDTH] IN BLOOD BY AUTOMATED COUNT: 15.1 % (ref 11.5–14.5)
GFR SERPL CREATININE-BSD FRML MDRD: 88 ML/MIN/1.73M*2
GLUCOSE BLD MANUAL STRIP-MCNC: 199 MG/DL (ref 74–99)
GLUCOSE BLD MANUAL STRIP-MCNC: 208 MG/DL (ref 74–99)
GLUCOSE BLD MANUAL STRIP-MCNC: 227 MG/DL (ref 74–99)
GLUCOSE BLD MANUAL STRIP-MCNC: 336 MG/DL (ref 74–99)
GLUCOSE SERPL-MCNC: 214 MG/DL (ref 74–99)
HCT VFR BLD AUTO: 37.7 % (ref 36–46)
HGB BLD-MCNC: 12.4 G/DL (ref 12–16)
MCH RBC QN AUTO: 29.2 PG (ref 26–34)
MCHC RBC AUTO-ENTMCNC: 32.9 G/DL (ref 32–36)
MCV RBC AUTO: 89 FL (ref 80–100)
NRBC BLD-RTO: 0 /100 WBCS (ref 0–0)
PLATELET # BLD AUTO: 198 X10*3/UL (ref 150–450)
POTASSIUM SERPL-SCNC: 3.6 MMOL/L (ref 3.5–5.3)
PROT SERPL-MCNC: 6.2 G/DL (ref 6.4–8.2)
RBC # BLD AUTO: 4.25 X10*6/UL (ref 4–5.2)
SODIUM SERPL-SCNC: 135 MMOL/L (ref 136–145)
WBC # BLD AUTO: 9.3 X10*3/UL (ref 4.4–11.3)

## 2023-11-27 PROCEDURE — 2500000004 HC RX 250 GENERAL PHARMACY W/ HCPCS (ALT 636 FOR OP/ED): Performed by: NURSE PRACTITIONER

## 2023-11-27 PROCEDURE — 2500000004 HC RX 250 GENERAL PHARMACY W/ HCPCS (ALT 636 FOR OP/ED): Performed by: PHYSICIAN ASSISTANT

## 2023-11-27 PROCEDURE — 80053 COMPREHEN METABOLIC PANEL: CPT | Performed by: NURSE PRACTITIONER

## 2023-11-27 PROCEDURE — 2500000001 HC RX 250 WO HCPCS SELF ADMINISTERED DRUGS (ALT 637 FOR MEDICARE OP): Performed by: INTERNAL MEDICINE

## 2023-11-27 PROCEDURE — G0378 HOSPITAL OBSERVATION PER HR: HCPCS

## 2023-11-27 PROCEDURE — 97112 NEUROMUSCULAR REEDUCATION: CPT | Mod: GP,CQ

## 2023-11-27 PROCEDURE — 97530 THERAPEUTIC ACTIVITIES: CPT | Mod: GO,CO

## 2023-11-27 PROCEDURE — 97530 THERAPEUTIC ACTIVITIES: CPT | Mod: GP,CQ

## 2023-11-27 PROCEDURE — 96372 THER/PROPH/DIAG INJ SC/IM: CPT | Performed by: NURSE PRACTITIONER

## 2023-11-27 PROCEDURE — 36415 COLL VENOUS BLD VENIPUNCTURE: CPT | Performed by: NURSE PRACTITIONER

## 2023-11-27 PROCEDURE — 2500000001 HC RX 250 WO HCPCS SELF ADMINISTERED DRUGS (ALT 637 FOR MEDICARE OP): Performed by: PHYSICIAN ASSISTANT

## 2023-11-27 PROCEDURE — 2500000002 HC RX 250 W HCPCS SELF ADMINISTERED DRUGS (ALT 637 FOR MEDICARE OP, ALT 636 FOR OP/ED): Performed by: PHYSICIAN ASSISTANT

## 2023-11-27 PROCEDURE — 97535 SELF CARE MNGMENT TRAINING: CPT | Mod: GO,CO

## 2023-11-27 PROCEDURE — 85027 COMPLETE CBC AUTOMATED: CPT | Performed by: NURSE PRACTITIONER

## 2023-11-27 PROCEDURE — 2500000001 HC RX 250 WO HCPCS SELF ADMINISTERED DRUGS (ALT 637 FOR MEDICARE OP): Performed by: NURSE PRACTITIONER

## 2023-11-27 PROCEDURE — 82947 ASSAY GLUCOSE BLOOD QUANT: CPT | Mod: 59

## 2023-11-27 RX ADMIN — PANTOPRAZOLE SODIUM 20 MG: 20 TABLET, DELAYED RELEASE ORAL at 06:08

## 2023-11-27 RX ADMIN — ENOXAPARIN SODIUM 40 MG: 40 INJECTION SUBCUTANEOUS at 16:13

## 2023-11-27 RX ADMIN — INSULIN LISPRO 4 UNITS: 100 INJECTION, SOLUTION INTRAVENOUS; SUBCUTANEOUS at 13:04

## 2023-11-27 RX ADMIN — CHLORPROMAZINE HYDROCHLORIDE 10 MG: 10 TABLET, FILM COATED ORAL at 21:12

## 2023-11-27 RX ADMIN — ALPRAZOLAM 0.12 MG: 0.25 TABLET ORAL at 21:12

## 2023-11-27 RX ADMIN — ACETAMINOPHEN 650 MG: 325 TABLET ORAL at 19:24

## 2023-11-27 RX ADMIN — ASPIRIN 81 MG: 81 TABLET, COATED ORAL at 08:57

## 2023-11-27 RX ADMIN — METOPROLOL TARTRATE 75 MG: 50 TABLET, FILM COATED ORAL at 08:57

## 2023-11-27 RX ADMIN — LEVOTHYROXINE SODIUM 100 MCG: 0.1 TABLET ORAL at 06:09

## 2023-11-27 RX ADMIN — ATORVASTATIN CALCIUM 80 MG: 80 TABLET, FILM COATED ORAL at 08:57

## 2023-11-27 RX ADMIN — METOPROLOL TARTRATE 75 MG: 50 TABLET, FILM COATED ORAL at 21:11

## 2023-11-27 RX ADMIN — AMLODIPINE BESYLATE 10 MG: 10 TABLET ORAL at 08:57

## 2023-11-27 RX ADMIN — ACETAMINOPHEN 650 MG: 325 TABLET ORAL at 12:57

## 2023-11-27 RX ADMIN — ALPRAZOLAM 0.12 MG: 0.25 TABLET ORAL at 08:56

## 2023-11-27 RX ADMIN — INSULIN LISPRO 2 UNITS: 100 INJECTION, SOLUTION INTRAVENOUS; SUBCUTANEOUS at 16:13

## 2023-11-27 RX ADMIN — Medication 3 MG: at 21:21

## 2023-11-27 RX ADMIN — ACETAMINOPHEN 650 MG: 325 TABLET ORAL at 08:56

## 2023-11-27 RX ADMIN — LOSARTAN POTASSIUM 100 MG: 100 TABLET, FILM COATED ORAL at 08:57

## 2023-11-27 RX ADMIN — INSULIN LISPRO 1 UNITS: 100 INJECTION, SOLUTION INTRAVENOUS; SUBCUTANEOUS at 08:19

## 2023-11-27 ASSESSMENT — ACTIVITIES OF DAILY LIVING (ADL)
HOME_MANAGEMENT_TIME_ENTRY: 24
EFFECT OF PAIN ON DAILY ACTIVITIES: .

## 2023-11-27 ASSESSMENT — COGNITIVE AND FUNCTIONAL STATUS - GENERAL
MOBILITY SCORE: 10
EATING MEALS: A LITTLE
DRESSING REGULAR LOWER BODY CLOTHING: A LOT
TOILETING: A LOT
STANDING UP FROM CHAIR USING ARMS: A LOT
DRESSING REGULAR UPPER BODY CLOTHING: A LOT
DRESSING REGULAR LOWER BODY CLOTHING: A LOT
MOVING FROM LYING ON BACK TO SITTING ON SIDE OF FLAT BED WITH BEDRAILS: A LOT
TOILETING: A LOT
WALKING IN HOSPITAL ROOM: A LOT
STANDING UP FROM CHAIR USING ARMS: A LOT
TURNING FROM BACK TO SIDE WHILE IN FLAT BAD: A LOT
WALKING IN HOSPITAL ROOM: A LOT
MOVING TO AND FROM BED TO CHAIR: TOTAL
TURNING FROM BACK TO SIDE WHILE IN FLAT BAD: A LOT
HELP NEEDED FOR BATHING: A LOT
DRESSING REGULAR LOWER BODY CLOTHING: A LOT
MOBILITY SCORE: 11
TOILETING: A LOT
CLIMB 3 TO 5 STEPS WITH RAILING: TOTAL
MOVING TO AND FROM BED TO CHAIR: A LOT
DAILY ACTIVITIY SCORE: 14
PERSONAL GROOMING: A LITTLE
DRESSING REGULAR UPPER BODY CLOTHING: A LOT
CLIMB 3 TO 5 STEPS WITH RAILING: TOTAL
EATING MEALS: A LOT
PERSONAL GROOMING: A LOT
DRESSING REGULAR UPPER BODY CLOTHING: A LOT
DAILY ACTIVITIY SCORE: 12
EATING MEALS: A LOT
HELP NEEDED FOR BATHING: A LOT
DAILY ACTIVITIY SCORE: 12
PERSONAL GROOMING: A LOT
HELP NEEDED FOR BATHING: A LOT
MOVING FROM LYING ON BACK TO SITTING ON SIDE OF FLAT BED WITH BEDRAILS: A LOT

## 2023-11-27 ASSESSMENT — PAIN SCALES - GENERAL
PAINLEVEL_OUTOF10: 6
PAINLEVEL_OUTOF10: 0 - NO PAIN
PAINLEVEL_OUTOF10: 0 - NO PAIN
PAINLEVEL_OUTOF10: 4
PAINLEVEL_OUTOF10: 6
PAINLEVEL_OUTOF10: 5 - MODERATE PAIN
PAINLEVEL_OUTOF10: 0 - NO PAIN
PAINLEVEL_OUTOF10: 5 - MODERATE PAIN
PAINLEVEL_OUTOF10: 6

## 2023-11-27 ASSESSMENT — PAIN - FUNCTIONAL ASSESSMENT
PAIN_FUNCTIONAL_ASSESSMENT: 0-10

## 2023-11-27 NOTE — PROGRESS NOTES
[facility] not accept [employer]. [facility] Voice mail left for daughter Rubina to return my call with her choice.     1510: Daughter returned my call and the family would like the [facility] location. Referral sent to this facility.     1600 Patient accepted to [facility], ([facility]), message to direct precert team to start the insurance pre cert.

## 2023-11-27 NOTE — PROGRESS NOTES
Occupational Therapy    OT Treatment    Patient Name: Toshia Méndez  MRN: [id number]  Today's Date: 11/27/2023  Time Calculation  Start Time: 0951  Stop Time: 1030  Time Calculation (min): 39 min      11/27/23 0951   OT Last Visit   OT Received On 11/27/23   General   Prior to Session Communication Bedside nurse   Patient Position Received Bed, 3 rail up;Alarm on   Preferred Learning Style verbal   General Comment Pt OK for therapy per RN, Pt agreeable to therapy   Precautions   UE Weight Bearing Status Left Non-Weight Bearing   LE Weight Bearing Status Weight Bearing as Tolerated   Medical Precautions Fall precautions   Pain Assessment   Pain Assessment 0-10   Pain Score 6  (8 with movement)   Pain Type Acute pain   Pain Location Shoulder   Pain Orientation Left   Cognition   Overall Cognitive Status WFL   Orientation Level Oriented X4   Bed Mobility   Bed Mobility Yes   Bed Mobility 1   Bed Mobility 1 Supine to sitting   Level of Assistance 1 Moderate assistance   Bed Mobility Comments 1 HOB elevated, assist with upright positioning while seated   Transfers   Transfer Yes   Transfer 1   Transfer From 1 Bed to   Transfer to 1 Commode-standard   Technique 1 Stand to sit;Stand pivot;Sit to stand   Transfer Device 1 Gait belt   Transfer Level of Assistance 1 Arm in arm assistance;Hand held assistance;Moderate assistance   Trials/Comments 1 Multiple transfers from bed to commode, commode to chair   Dynamic Sitting Balance   Dynamic Sitting-Balance Support No upper extremity supported   Dynamic Sitting-Balance Reaching for objects   Dynamic Sitting-Comments reaching for toothbrush, toothpaste   IP OT Assessment   End of Session Communication PCT/NA/ASHLEY   End of Session Patient Position Up in chair;Alarm on   Inpatient Plan   OT Frequency 3 times per week   OT Discharge Recommendations Moderate intensity level of continued care         Assessment:  End of Session Communication: PCT/NA/CTA  End of Session Patient  Position: Up in chair, Alarm on       Plan:  OT Frequency: 3 times per week  OT Discharge Recommendations: Moderate intensity level of continued care     Subjective     Outcome Measures:[facility] Daily Activity  Putting on and taking off regular lower body clothing: A lot  Bathing (including washing, rinsing, drying): A lot  Putting on and taking off regular upper body clothing: A lot  Toileting, which includes using toilet, bedpan or urinal: A lot  Taking care of personal grooming such as brushing teeth: A little  Eating Meals: A little  Daily Activity - Total Score: 14  Education Documentation  No documentation found.  Education Comments  No comments found.            Goals:  Encounter Problems       Encounter Problems (Active)       Dressing Upper Extremities       STG -2  (Progressing)       Start:  11/24/23    Expected End:  12/08/23       Pt will perform UE dressing and donning doffing Lt arm sling with contact guard set up and cues.             Dressings Lower Extremities       STG - 1 (Progressing)       Start:  11/24/23    Expected End:  12/08/23       Pt will perform LE dressing with adaptive equipment prn Mod A of 1 with cues safely.             Eating       STG - 3 (Progressing)       Start:  11/24/23    Expected End:  12/08/23       Pt will use One handed technique to open containers and feed self meal with supervision set up.             Mobility       Goal 4 (Progressing)       Start:  11/24/23    Expected End:  12/08/23       Pt will perform standing tolerance NWB LT arm Lt arm in sling when up Min A of 1-2 safely with appropriate assistive device for 5-10 minutes to assist with transfers.             Safety       LTG - Patient will adhere to hip precautions during ADL's and transfers (Progressing)       Start:  11/24/23            LTG - Patient will demonstrate safety requirements appropriate to situation/environment (Progressing)       Start:  11/24/23            LTG - Patient will utilize safety  techniques (Progressing)       Start:  11/24/23            STG - Patient locks brakes on wheelchair (Progressing)       Start:  11/24/23            STG - Patient uses call light consistently to request assistance with transfers (Progressing)       Start:  11/24/23            STG - Patient uses gait belt during all transfers (Progressing)       Start:  11/24/23            Goal 1 (Progressing)       Start:  11/24/23            Goal 2 (Progressing)       Start:  11/24/23            Goal 3 (Progressing)       Start:  11/24/23               Safety       Goal 5 (Progressing)       Start:  11/24/23    Expected End:  12/08/23       Pt will be independent with NWB LT arm with verbal cues. Pt will perform Lt hand gripping with sponge daily for assist with ROM to Lt hand.             Toileting       STG - 6 (Progressing)       Start:  11/24/23    Expected End:  12/08/23       Pt will perform toileting tasks with Mod A of 1 safely.             Transfers       Goal 7 (Progressing)       Start:  11/24/23    Expected End:  12/08/23       Pt will transfer bed to chair/ commode with appropriate device safely with cues NWB LT arm Lt arm in sling with Min A of 1-2.

## 2023-11-27 NOTE — PROGRESS NOTES
Physical Therapy    Physical Therapy    Physical Therapy Treatment    Patient Name: Toshia Méndez  MRN: [id number]  Today's Date: 11/27/2023  Time Calculation  Start Time: 0954  Stop Time: 1032  Time Calculation (min): 38 min      11/27/23 0954   PT  Visit   PT Received On 11/27/23   Response to Previous Treatment Patient with no complaints from previous session.   General   Prior to Session Communication Bedside nurse   Patient Position Received Bed, 3 rail up   General Comment Pt agreeable to therapy and cleared by nurse.   Precautions   UE Weight Bearing Status Left Non-Weight Bearing   Medical Precautions Fall precautions   Pain Assessment   Pain Assessment 0-10   Pain Score 6  (8/10 with movement)   Pain Location Shoulder   Pain Orientation Left   Effect of Pain on Daily Activities .   Cognition   Overall Cognitive Status WFL   Therapeutic Activity   Therapeutic Activity Performed Yes   Therapeutic Activity 1 sitting balance activities, right lean, cues to maintain midline with poor>fair follow through   Bed Mobility   Bed Mobility Yes   Bed Mobility 1   Bed Mobility 1 Supine to sitting   Level of Assistance 1 Moderate assistance;Moderate verbal cues;Minimal tactile cues  (x2)   Bed Mobility Comments 1 HOB elevated, draw sheet used to complete transfer to EOB with feet flat.   Ambulation/Gait Training   Ambulation/Gait Training Performed Yes   Ambulation/Gait Training 1   Surface 1 Level tile   Device 1   (HHA)   Gait Support Devices Gait belt   Assistance 1 Moderate assistance;Maximum verbal cues;Maximum assistance   Quality of Gait 1 Shuffling gait  (Heavy R lean)   Comments/Distance (ft) 1 8'- heavy right lean, max cues to advance LLE and to maintain midline with poor>fair follow through.   Transfers   Transfer Yes   Transfer 1   Transfer From 1 Bed to   Transfer to 1 Commode-standard   Technique 1 Sit to stand;Stand to sit;Stand pivot   Transfer Device 1 Gait belt   Transfer Level of Assistance 1 Hand  held assistance   Trials/Comments 1 Multiple transfers from bed>BSC.>chair, modAx2 with max cues for hand placement and sequencing, fair follow through   Activity Tolerance   Endurance Tolerates less than 10 min exercise, no significant change in vital signs   PT Assessment   PT Assessment Results Decreased strength;Decreased endurance;Impaired balance;Decreased mobility   Rehab Prognosis Good   End of Session Communication PCT/NA/CTA   End of Session Patient Position Up in chair;Alarm on       Assessment/Plan   PT Assessment  PT Assessment Results: Decreased strength, Decreased endurance, Impaired balance, Decreased mobility  Rehab Prognosis: Good  Evaluation/Treatment Tolerance: Patient limited by pain, Patient limited by fatigue  Medical Staff Made Aware: Yes  End of Session Communication: PCT/NA/CTA  End of Session Patient Position: Up in chair, Alarm on  PT Plan  Inpatient/Swing Bed or Outpatient: Inpatient  PT Plan  Treatment/Interventions: Bed mobility, Transfer training, Gait training, Stair training, Balance training, Neuromuscular re-education, Strengthening, Range of motion, Therapeutic exercise, Therapeutic activity  PT Plan: Skilled PT  PT Frequency: 3 times per week  PT Discharge Recommendations: Moderate intensity level of continued care  Equipment Recommended upon Discharge: Straight cane  PT Recommended Transfer Status: Assist x2  PT - OK to Discharge: Yes    Outcome Measures:  [facility] Basic Mobility  Turning from your back to your side while in a flat bed without using bedrails: A lot  Moving from lying on your back to sitting on the side of a flat bed without using bedrails: A lot  Moving to and from bed to chair (including a wheelchair): A lot  Standing up from a chair using your arms (e.g. wheelchair or bedside chair): A lot  To walk in hospital room: A lot  Climbing 3-5 steps with railing: Total  Basic Mobility - Total Score: 11  Education Documentation  Precautions, taught by Tiffany Solorzano, PTA  at 11/27/2023 10:51 AM.  Learner: Patient  Readiness: Acceptance  Method: Explanation  Response: Verbalizes Understanding, Needs Reinforcement    Body Mechanics, taught by Tiffany Solorzano PTA at 11/27/2023 10:51 AM.  Learner: Patient  Readiness: Acceptance  Method: Explanation  Response: Verbalizes Understanding, Needs Reinforcement    Mobility Training, taught by Tiffany Solorzano PTA at 11/27/2023 10:51 AM.  Learner: Patient  Readiness: Acceptance  Method: Explanation  Response: Verbalizes Understanding, Needs Reinforcement    Education Comments  No comments found.            EDUCATION:  Outpatient Education  Individual(s) Educated: Patient  Education Provided: Fall Risk    GOALS:  Encounter Problems       Encounter Problems (Active)       PT Problem       Pt will be able to perform all bed mobility tasks with CGA-Min A while maintaining LUE NWB.  (Progressing)       Start:  11/24/23    Expected End:  12/08/23            Pt will perform all transfers with CGA-Min A and LRAD with proper safety mechanics while maintaining LUE NWB (Progressing)       Start:  11/24/23    Expected End:  12/08/23            Pt will ambulate 50 ft with Min A using LRAD for improved functional independence.  (Progressing)       Start:  11/24/23    Expected End:  12/08/23            Pt will be able to maintain LUE NWB precautions while performing functional mobility and tasks.  (Progressing)       Start:  11/24/23    Expected End:  12/08/23            Pt will be able to negotiate 1 steps with 1 HR with Min A.  (Progressing)       Start:  11/24/23    Expected End:  12/08/23

## 2023-11-27 NOTE — CARE PLAN
The patient's goals for the shift include      The clinical goals for the shift include safety maintained    Problem: Skin  Goal: Decreased wound size/increased tissue granulation at next dressing change  Outcome: Progressing  Goal: Participates in plan/prevention/treatment measures  Outcome: Progressing  Goal: Prevent/manage excess moisture  Outcome: Progressing  Goal: Prevent/minimize sheer/friction injuries  Outcome: Progressing  Goal: Promote/optimize nutrition  Outcome: Progressing  Goal: Promote skin healing  Outcome: Progressing     Problem: Fall/Injury  Goal: Verbalize understanding of personal risk factors for fall in the hospital  Outcome: Progressing  Goal: Verbalize understanding of risk factor reduction measures to prevent injury from fall in the home  Outcome: Progressing  Goal: Use assistive devices by end of the shift  Outcome: Progressing  Goal: Pace activities to prevent fatigue by end of the shift  Outcome: Progressing     Problem: Pain  Goal: Takes deep breaths with improved pain control throughout the shift  Outcome: Progressing  Goal: Turns in bed with improved pain control throughout the shift  Outcome: Progressing  Goal: Walks with improved pain control throughout the shift  Outcome: Progressing  Goal: Performs ADL's with improved pain control throughout shift  Outcome: Progressing  Goal: Participates in PT with improved pain control throughout the shift  Outcome: Progressing  Goal: Free from opioid side effects throughout the shift  Outcome: Progressing  Goal: Free from acute confusion related to pain meds throughout the shift  Outcome: Progressing     Problem: Safety  Goal: Patient will be injury free during hospitalization  Outcome: Progressing  Goal: I will remain free of falls  Outcome: Progressing

## 2023-11-28 VITALS
HEIGHT: 62 IN | HEART RATE: 70 BPM | BODY MASS INDEX: 27.55 KG/M2 | TEMPERATURE: 97.7 F | DIASTOLIC BLOOD PRESSURE: 64 MMHG | WEIGHT: 149.69 LBS | SYSTOLIC BLOOD PRESSURE: 135 MMHG | RESPIRATION RATE: 18 BRPM | OXYGEN SATURATION: 95 %

## 2023-11-28 LAB
ALBUMIN SERPL BCP-MCNC: 3.3 G/DL (ref 3.4–5)
ALP SERPL-CCNC: 86 U/L (ref 33–136)
ALT SERPL W P-5'-P-CCNC: 23 U/L (ref 7–45)
ANION GAP SERPL CALC-SCNC: 11 MMOL/L (ref 10–20)
AST SERPL W P-5'-P-CCNC: 21 U/L (ref 9–39)
BILIRUB SERPL-MCNC: 0.8 MG/DL (ref 0–1.2)
BUN SERPL-MCNC: 16 MG/DL (ref 6–23)
CALCIUM SERPL-MCNC: 9.2 MG/DL (ref 8.6–10.3)
CHLORIDE SERPL-SCNC: 103 MMOL/L (ref 98–107)
CO2 SERPL-SCNC: 26 MMOL/L (ref 21–32)
CREAT SERPL-MCNC: 0.54 MG/DL (ref 0.5–1.05)
ERYTHROCYTE [DISTWIDTH] IN BLOOD BY AUTOMATED COUNT: 15.2 % (ref 11.5–14.5)
GFR SERPL CREATININE-BSD FRML MDRD: 85 ML/MIN/1.73M*2
GLUCOSE BLD MANUAL STRIP-MCNC: 215 MG/DL (ref 74–99)
GLUCOSE SERPL-MCNC: 200 MG/DL (ref 74–99)
HCT VFR BLD AUTO: 41 % (ref 36–46)
HGB BLD-MCNC: 13.2 G/DL (ref 12–16)
MCH RBC QN AUTO: 28.8 PG (ref 26–34)
MCHC RBC AUTO-ENTMCNC: 32.2 G/DL (ref 32–36)
MCV RBC AUTO: 89 FL (ref 80–100)
NRBC BLD-RTO: 0 /100 WBCS (ref 0–0)
PLATELET # BLD AUTO: 214 X10*3/UL (ref 150–450)
POTASSIUM SERPL-SCNC: 3.6 MMOL/L (ref 3.5–5.3)
PROT SERPL-MCNC: 6.9 G/DL (ref 6.4–8.2)
RBC # BLD AUTO: 4.59 X10*6/UL (ref 4–5.2)
SODIUM SERPL-SCNC: 136 MMOL/L (ref 136–145)
WBC # BLD AUTO: 7.6 X10*3/UL (ref 4.4–11.3)

## 2023-11-28 PROCEDURE — 2500000004 HC RX 250 GENERAL PHARMACY W/ HCPCS (ALT 636 FOR OP/ED): Performed by: PHYSICIAN ASSISTANT

## 2023-11-28 PROCEDURE — 85027 COMPLETE CBC AUTOMATED: CPT | Performed by: NURSE PRACTITIONER

## 2023-11-28 PROCEDURE — 2500000001 HC RX 250 WO HCPCS SELF ADMINISTERED DRUGS (ALT 637 FOR MEDICARE OP): Performed by: INTERNAL MEDICINE

## 2023-11-28 PROCEDURE — 84075 ASSAY ALKALINE PHOSPHATASE: CPT | Performed by: NURSE PRACTITIONER

## 2023-11-28 PROCEDURE — G0378 HOSPITAL OBSERVATION PER HR: HCPCS

## 2023-11-28 PROCEDURE — 2500000002 HC RX 250 W HCPCS SELF ADMINISTERED DRUGS (ALT 637 FOR MEDICARE OP, ALT 636 FOR OP/ED): Performed by: PHYSICIAN ASSISTANT

## 2023-11-28 PROCEDURE — 82947 ASSAY GLUCOSE BLOOD QUANT: CPT | Mod: 59

## 2023-11-28 PROCEDURE — 2500000001 HC RX 250 WO HCPCS SELF ADMINISTERED DRUGS (ALT 637 FOR MEDICARE OP): Performed by: PHYSICIAN ASSISTANT

## 2023-11-28 PROCEDURE — 36415 COLL VENOUS BLD VENIPUNCTURE: CPT | Performed by: NURSE PRACTITIONER

## 2023-11-28 RX ADMIN — LOSARTAN POTASSIUM 100 MG: 100 TABLET, FILM COATED ORAL at 08:58

## 2023-11-28 RX ADMIN — ATORVASTATIN CALCIUM 80 MG: 80 TABLET, FILM COATED ORAL at 08:58

## 2023-11-28 RX ADMIN — METOPROLOL TARTRATE 75 MG: 50 TABLET, FILM COATED ORAL at 08:58

## 2023-11-28 RX ADMIN — ALPRAZOLAM 0.12 MG: 0.25 TABLET ORAL at 08:59

## 2023-11-28 RX ADMIN — ACETAMINOPHEN 650 MG: 325 TABLET ORAL at 05:50

## 2023-11-28 RX ADMIN — AMLODIPINE BESYLATE 10 MG: 10 TABLET ORAL at 08:59

## 2023-11-28 RX ADMIN — LEVOTHYROXINE SODIUM 100 MCG: 0.1 TABLET ORAL at 08:58

## 2023-11-28 RX ADMIN — INSULIN LISPRO 2 UNITS: 100 INJECTION, SOLUTION INTRAVENOUS; SUBCUTANEOUS at 08:58

## 2023-11-28 RX ADMIN — ASPIRIN 81 MG: 81 TABLET, COATED ORAL at 08:59

## 2023-11-28 RX ADMIN — PANTOPRAZOLE SODIUM 20 MG: 20 TABLET, DELAYED RELEASE ORAL at 07:09

## 2023-11-28 ASSESSMENT — PAIN SCALES - GENERAL
PAINLEVEL_OUTOF10: 3
PAINLEVEL_OUTOF10: 0 - NO PAIN
PAINLEVEL_OUTOF10: 0 - NO PAIN

## 2023-11-28 ASSESSMENT — PAIN - FUNCTIONAL ASSESSMENT: PAIN_FUNCTIONAL_ASSESSMENT: 0-10

## 2023-11-28 ASSESSMENT — COGNITIVE AND FUNCTIONAL STATUS - GENERAL
PERSONAL GROOMING: A LOT
DRESSING REGULAR LOWER BODY CLOTHING: A LOT
HELP NEEDED FOR BATHING: A LOT
DAILY ACTIVITIY SCORE: 12
MOVING TO AND FROM BED TO CHAIR: TOTAL
MOVING FROM LYING ON BACK TO SITTING ON SIDE OF FLAT BED WITH BEDRAILS: A LOT
STANDING UP FROM CHAIR USING ARMS: A LOT
DRESSING REGULAR UPPER BODY CLOTHING: A LOT
CLIMB 3 TO 5 STEPS WITH RAILING: TOTAL
MOBILITY SCORE: 10
TURNING FROM BACK TO SIDE WHILE IN FLAT BAD: A LOT
WALKING IN HOSPITAL ROOM: A LOT
EATING MEALS: A LOT
TOILETING: A LOT

## 2023-11-28 ASSESSMENT — PAIN DESCRIPTION - ORIENTATION: ORIENTATION: LEFT

## 2023-11-28 ASSESSMENT — PAIN SCALES - PAIN ASSESSMENT IN ADVANCED DEMENTIA (PAINAD)
BREATHING: NORMAL
FACIALEXPRESSION: SMILING OR INEXPRESSIVE
BODYLANGUAGE: RELAXED

## 2023-11-28 ASSESSMENT — PAIN SCALES - WONG BAKER: WONGBAKER_NUMERICALRESPONSE: HURTS LITTLE BIT

## 2023-11-28 NOTE — CARE PLAN
The patient's goals for the shift include      The clinical goals for the shift include safety moniotr v/s      Problem: Fall/Injury  Goal: Verbalize understanding of personal risk factors for fall in the hospital  Outcome: Progressing  Goal: Verbalize understanding of risk factor reduction measures to prevent injury from fall in the home  Outcome: Progressing  Goal: Use assistive devices by end of the shift  Outcome: Progressing  Goal: Pace activities to prevent fatigue by end of the shift  Outcome: Progressing     Problem: Pain  Goal: Takes deep breaths with improved pain control throughout the shift  Outcome: Progressing  Goal: Turns in bed with improved pain control throughout the shift  Outcome: Progressing  Goal: Walks with improved pain control throughout the shift  Outcome: Progressing  Goal: Performs ADL's with improved pain control throughout shift  Outcome: Progressing  Goal: Free from opioid side effects throughout the shift  Outcome: Progressing  Goal: Free from acute confusion related to pain meds throughout the shift  Outcome: Progressing     Problem: Pain  Goal: My pain/discomfort is manageable  Outcome: Progressing     Problem: Safety  Goal: Patient will be injury free during hospitalization  Outcome: Progressing  Goal: I will remain free of falls  Outcome: Progressing     Problem: Daily Care  Goal: Daily care needs are met  Outcome: Progressing     Problem: Psychosocial Needs  Goal: Demonstrates ability to cope with hospitalization/illness  Outcome: Progressing  Goal: Collaborate with me, my family, and caregiver to identify my specific goals  Outcome: Progressing  Flowsheets (Taken 11/27/2023 1945)  Cultural Requests During Hospitalization: none  Spiritual Requests During Hospitalization: none     Problem: Skin  Goal: Decreased wound size/increased tissue granulation at next dressing change  Outcome: Progressing  Goal: Participates in plan/prevention/treatment measures  Outcome: Progressing  Goal:  Prevent/manage excess moisture  Outcome: Progressing  Goal: Prevent/minimize sheer/friction injuries  Outcome: Progressing  Goal: Promote/optimize nutrition  Outcome: Progressing  Goal: Promote skin healing  Outcome: Progressing

## 2023-11-28 NOTE — PROGRESS NOTES
Patient is going to be discharged today to [facility]. Transport time has been set up for 1200pm. Facility aware along with bedside RN and family. TCC team will continue to follow till discharge.       Yasemin Leggett RN

## 2023-11-29 ENCOUNTER — NURSING HOME VISIT (OUTPATIENT)
Dept: POST ACUTE CARE | Facility: EXTERNAL LOCATION | Age: 88
End: 2023-11-29
Payer: MEDICARE

## 2023-11-29 VITALS — HEART RATE: 68 BPM | DIASTOLIC BLOOD PRESSURE: 78 MMHG | SYSTOLIC BLOOD PRESSURE: 132 MMHG

## 2023-11-29 DIAGNOSIS — F41.8 DEPRESSION WITH ANXIETY: ICD-10-CM

## 2023-11-29 DIAGNOSIS — I25.10 ATHEROSCLEROSIS OF NATIVE CORONARY ARTERY OF NATIVE HEART WITHOUT ANGINA PECTORIS: ICD-10-CM

## 2023-11-29 DIAGNOSIS — E03.9 ACQUIRED HYPOTHYROIDISM: ICD-10-CM

## 2023-11-29 DIAGNOSIS — S42.422D: Primary | ICD-10-CM

## 2023-11-29 DIAGNOSIS — E11.69 DM TYPE 2 WITH DIABETIC MIXED HYPERLIPIDEMIA (MULTI): ICD-10-CM

## 2023-11-29 DIAGNOSIS — E78.2 DM TYPE 2 WITH DIABETIC MIXED HYPERLIPIDEMIA (MULTI): ICD-10-CM

## 2023-11-29 DIAGNOSIS — Z95.0 PACEMAKER: ICD-10-CM

## 2023-11-29 PROBLEM — Z86.73 OLD CEREBROVASCULAR ACCIDENT (CVA) WITHOUT LATE EFFECT: Status: ACTIVE | Noted: 2023-06-24

## 2023-11-29 PROCEDURE — 99306 1ST NF CARE HIGH MDM 50: CPT | Performed by: INTERNAL MEDICINE

## 2023-11-29 ASSESSMENT — ENCOUNTER SYMPTOMS
NUMBNESS: 0
SHORTNESS OF BREATH: 0
NAUSEA: 0
CONSTIPATION: 0
DIZZINESS: 0
WEAKNESS: 1
LIGHT-HEADEDNESS: 0
ARTHRALGIAS: 1
FEVER: 0
ACTIVITY CHANGE: 1
CHOKING: 0
ABDOMINAL DISTENTION: 0
NERVOUS/ANXIOUS: 1
APPETITE CHANGE: 0
DIARRHEA: 0
COUGH: 0
JOINT SWELLING: 1

## 2023-11-29 NOTE — LETTER
Patient: Toshia Méndez  : 1929    Encounter Date: 2023    Subjective  Patient ID: Toshia Méndez is a 89 y.o. female who is acute skilled care and presents for initial visit for skilled nursing.    89-year-old female patient has been back and forth to the hospital, in month of May she sustained a stroke, she received a thrombolytic, it was complicated by hemorrhagic stroke, afterwards that she has had 2 ER visits both the time it was for dizziness and weakness no new stroke was found, recently patient fell at home, patient lives independently.  Patient must have fell on the left side, she has a comminuted fracture of the proximal left humerus with displacement.  Patient was opted for nonsurgical treatment for fracture of humerus.  Patient must have a mental health problem because patient is on the typical antipsychotics in the old-fashioned call trifluoperazine and chlorpromazine which are Thorazine derivatives.  This is a longstanding treatment.  Patient must have osteoporotic skeleton, patient has history of hypertension, her last hemoglobin A1c was 8.3, hemoglobin was normal, TSH was 8.12, radiograph of humerus was reviewed.  Patient was able to sustain communication and conversation, she is aware about what exactly is happening to her, she must have a extreme hypertriglyceridemia because of atypical antipsychotics so patient is on Lovaza.  Because of the fracture of humerus and impaired mobility patient is admitted here for skilled nursing and rehabilitation.  Overnight has been uneventful, weakness is not quite obvious but she says that she is weak on the left side because of old stroke.  When I saw this patient she was sitting upright, not confused or disoriented, shoulder sling was applied on her left upper extremity.  Ambulatory care records and previous history and information and previous nursing evaluation by outside physician was reviewed.         Review of Systems    Constitutional:  Positive for activity change. Negative for appetite change and fever.   HENT:  Negative for congestion.    Respiratory:  Negative for cough, choking and shortness of breath.    Cardiovascular:  Negative for chest pain and leg swelling.   Gastrointestinal:  Negative for abdominal distention, constipation, diarrhea and nausea.   Musculoskeletal:  Positive for arthralgias and joint swelling.   Neurological:  Positive for weakness. Negative for dizziness, light-headedness and numbness.   Psychiatric/Behavioral:  The patient is nervous/anxious.        Objective  /78   Pulse 68     Physical Exam  Constitutional:       General: She is not in acute distress.     Appearance: Normal appearance. She is normal weight. She is not ill-appearing.   HENT:      Head: Normocephalic.   Eyes:      Conjunctiva/sclera: Conjunctivae normal.   Cardiovascular:      Rate and Rhythm: Normal rate and regular rhythm.      Heart sounds: Normal heart sounds.   Pulmonary:      Breath sounds: Normal breath sounds.   Abdominal:      General: Abdomen is flat.      Palpations: Abdomen is soft.   Musculoskeletal:         General: Tenderness present.      Left upper arm: Swelling, edema, deformity and tenderness present.      Cervical back: Neck supple.   Skin:     General: Skin is warm and dry.   Neurological:      General: No focal deficit present.      Mental Status: She is oriented to person, place, and time. Mental status is at baseline.      Motor: Weakness present.   Psychiatric:         Mood and Affect: Mood normal.         Cognition and Memory: Cognition is impaired.         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  Patient's medication includes Xanax 0.5 mg twice a day, amlodipine, ascorbic acid, aspirin, atorvastatin, Biotene, chlorpromazine, empagliflozin, folic acid, Lasix, glimepiride 4 mg twice a day, levothyroxine, losartan, Lovaza, metoprolol, omeprazole, trifluoperazine.  Glimepiride will be reduced to 4 mg once a day, blood sugars will be checked and notified me.  At the age of 89 glimepiride 4 mg twice a day is the not appropriate therapy.  Patient has a pacemaker, patient will require appropriate assist and precautions, she will require analgesics.  Patient's TSH has been abnormal levothyroxine dosage adjustment must be done.  Physical therapy, Occupational Therapy evaluation are in process, other routine safety precautions has to be taken.  Patient is going to be assisted for ADLs.  COVID-19 related precautions and checkups will be done.  Laboratories will be done as per our routine, blood sugars will be notified to me.  Appropriate orthopedic follow-up will be done and Thorazine derivatives to be continued because it is a longstanding treatment, there is unknown severity of coronary artery disease and there is no active angina.  Patient remains at risk of fall and has remains at moderate risk of recurrent hospitalization.  Discussed with nursing staff, short-term skilled nursing and rehabilitation is expected here for this patient who has been admitted after fracture of humerus.  Change in the clinical condition needs to be notified to physician promptly.     Goals    None           Electronically Signed By: Jamal Villagran MD   11/29/23 10:04 PM

## 2023-11-30 ENCOUNTER — HOSPITAL ENCOUNTER (OUTPATIENT)
Dept: CARDIOLOGY | Facility: HOSPITAL | Age: 88
Discharge: HOME | End: 2023-11-30
Payer: MEDICARE

## 2023-11-30 LAB
ATRIAL RATE: 82 BPM
P AXIS: 68 DEGREES
P OFFSET: 132 MS
P ONSET: 82 MS
PR INTERVAL: 228 MS
Q ONSET: 196 MS
QRS COUNT: 14 BEATS
QRS DURATION: 154 MS
QT INTERVAL: 448 MS
QTC CALCULATION(BAZETT): 523 MS
QTC FREDERICIA: 497 MS
R AXIS: 92 DEGREES
T AXIS: -79 DEGREES
T OFFSET: 420 MS
VENTRICULAR RATE: 82 BPM

## 2023-11-30 PROCEDURE — 93005 ELECTROCARDIOGRAM TRACING: CPT

## 2023-11-30 NOTE — PROGRESS NOTES
Subjective   Patient ID: Toshia Méndez is a 89 y.o. female who is acute skilled care and presents for initial visit for skilled nursing.    89-year-old female patient has been back and forth to the hospital, in month of May she sustained a stroke, she received a thrombolytic, it was complicated by hemorrhagic stroke, afterwards that she has had 2 ER visits both the time it was for dizziness and weakness no new stroke was found, recently patient fell at home, patient lives independently.  Patient must have fell on the left side, she has a comminuted fracture of the proximal left humerus with displacement.  Patient was opted for nonsurgical treatment for fracture of humerus.  Patient must have a mental health problem because patient is on the typical antipsychotics in the old-fashioned call trifluoperazine and chlorpromazine which are Thorazine derivatives.  This is a longstanding treatment.  Patient must have osteoporotic skeleton, patient has history of hypertension, her last hemoglobin A1c was 8.3, hemoglobin was normal, TSH was 8.12, radiograph of humerus was reviewed.  Patient was able to sustain communication and conversation, she is aware about what exactly is happening to her, she must have a extreme hypertriglyceridemia because of atypical antipsychotics so patient is on Lovaza.  Because of the fracture of humerus and impaired mobility patient is admitted here for skilled nursing and rehabilitation.  Overnight has been uneventful, weakness is not quite obvious but she says that she is weak on the left side because of old stroke.  When I saw this patient she was sitting upright, not confused or disoriented, shoulder sling was applied on her left upper extremity.  Ambulatory care records and previous history and information and previous nursing evaluation by outside physician was reviewed.         Review of Systems   Constitutional:  Positive for activity change. Negative for appetite change and fever.    HENT:  Negative for congestion.    Respiratory:  Negative for cough, choking and shortness of breath.    Cardiovascular:  Negative for chest pain and leg swelling.   Gastrointestinal:  Negative for abdominal distention, constipation, diarrhea and nausea.   Musculoskeletal:  Positive for arthralgias and joint swelling.   Neurological:  Positive for weakness. Negative for dizziness, light-headedness and numbness.   Psychiatric/Behavioral:  The patient is nervous/anxious.        Objective   /78   Pulse 68     Physical Exam  Constitutional:       General: She is not in acute distress.     Appearance: Normal appearance. She is normal weight. She is not ill-appearing.   HENT:      Head: Normocephalic.   Eyes:      Conjunctiva/sclera: Conjunctivae normal.   Cardiovascular:      Rate and Rhythm: Normal rate and regular rhythm.      Heart sounds: Normal heart sounds.   Pulmonary:      Breath sounds: Normal breath sounds.   Abdominal:      General: Abdomen is flat.      Palpations: Abdomen is soft.   Musculoskeletal:         General: Tenderness present.      Left upper arm: Swelling, edema, deformity and tenderness present.      Cervical back: Neck supple.   Skin:     General: Skin is warm and dry.   Neurological:      General: No focal deficit present.      Mental Status: She is oriented to person, place, and time. Mental status is at baseline.      Motor: Weakness present.   Psychiatric:         Mood and Affect: Mood normal.         Cognition and Memory: Cognition is impaired.         Assessment/Plan   Problem List Items Addressed This Visit             ICD-10-CM    Atherosclerosis of coronary artery I25.10    Depression with anxiety F41.8    Hypothyroidism E03.9    Pacemaker Z95.0    DM type 2 with diabetic mixed hyperlipidemia (CMS/HCC) E11.69, E78.2    Closed displaced comminuted supracondylar fracture of left humerus without intercondylar fracture with routine healing - Primary S42.422D   Patient's condition  was assessed, laboratories were reviewed, imagings were reviewed.  Patient's medication includes Xanax 0.5 mg twice a day, amlodipine, ascorbic acid, aspirin, atorvastatin, Biotene, chlorpromazine, empagliflozin, folic acid, Lasix, glimepiride 4 mg twice a day, levothyroxine, losartan, Lovaza, metoprolol, omeprazole, trifluoperazine.  Glimepiride will be reduced to 4 mg once a day, blood sugars will be checked and notified me.  At the age of 89 glimepiride 4 mg twice a day is the not appropriate therapy.  Patient has a pacemaker, patient will require appropriate assist and precautions, she will require analgesics.  Patient's TSH has been abnormal levothyroxine dosage adjustment must be done.  Physical therapy, Occupational Therapy evaluation are in process, other routine safety precautions has to be taken.  Patient is going to be assisted for ADLs.  COVID-19 related precautions and checkups will be done.  Laboratories will be done as per our routine, blood sugars will be notified to me.  Appropriate orthopedic follow-up will be done and Thorazine derivatives to be continued because it is a longstanding treatment, there is unknown severity of coronary artery disease and there is no active angina.  Patient remains at risk of fall and has remains at moderate risk of recurrent hospitalization.  Discussed with nursing staff, short-term skilled nursing and rehabilitation is expected here for this patient who has been admitted after fracture of humerus.  Change in the clinical condition needs to be notified to physician promptly.     Goals    None

## 2023-12-01 ENCOUNTER — NURSING HOME VISIT (OUTPATIENT)
Dept: POST ACUTE CARE | Facility: EXTERNAL LOCATION | Age: 88
End: 2023-12-01
Payer: MEDICARE

## 2023-12-01 VITALS
BODY MASS INDEX: 26.36 KG/M2 | WEIGHT: 144 LBS | DIASTOLIC BLOOD PRESSURE: 62 MMHG | TEMPERATURE: 97.9 F | OXYGEN SATURATION: 96 % | RESPIRATION RATE: 18 BRPM | SYSTOLIC BLOOD PRESSURE: 128 MMHG | HEART RATE: 74 BPM

## 2023-12-01 DIAGNOSIS — E03.9 ACQUIRED HYPOTHYROIDISM: ICD-10-CM

## 2023-12-01 DIAGNOSIS — N18.30 STAGE 3 CHRONIC KIDNEY DISEASE, UNSPECIFIED WHETHER STAGE 3A OR 3B CKD (MULTI): ICD-10-CM

## 2023-12-01 DIAGNOSIS — S42.422D: Primary | ICD-10-CM

## 2023-12-01 DIAGNOSIS — Z74.09 IMPAIRED FUNCTIONAL MOBILITY, BALANCE, GAIT, AND ENDURANCE: ICD-10-CM

## 2023-12-01 DIAGNOSIS — Z74.09 IMPAIRED FUNCTIONAL MOBILITY, BALANCE, AND ENDURANCE: ICD-10-CM

## 2023-12-01 DIAGNOSIS — F41.8 DEPRESSION WITH ANXIETY: ICD-10-CM

## 2023-12-01 PROCEDURE — 99310 SBSQ NF CARE HIGH MDM 45: CPT | Performed by: PHYSICIAN ASSISTANT

## 2023-12-01 NOTE — PROGRESS NOTES
12/1/2023  Name: Toshia Méndez  YOB: 1929    Chief complaint: Fall at home. Comminuted left humeral fracture     HPI: This is a 89 year old  female who has a medical history remarkable for CVA , L hemiparesis, falls, CAD, cardiac pacemaker, anxiety, depression, diabetes, stage 3 CKD, hypothyroidism, gerd, PE, gout,and hyperlipidemia. Patient presented to the ER for evaluation of fall at home on 11/23/2023. Radiographic studies showed a comminuted left humeral fracture. Patient was seen by orthopedic service who recommend nonoperative management with use of sling and nonweightbearing to the LUE as treatment. She was discharged to SNF for rehab.    Arm Injury   The incident occurred more than 1 week ago. The incident occurred at home. The injury mechanism was a fall. The pain is present in the upper left arm. The quality of the pain is described as aching. The pain is at a severity of 3/10. The pain is moderate. The pain has been Improving since the incident. The symptoms are aggravated by movement. She has tried acetaminophen, immobilization and rest for the symptoms. The treatment provided moderate relief.     Review of systems:   ROS negative except were noted in HPI.    Code Status: full code    /62   Pulse 74   Temp 36.6 °C (97.9 °F)   Resp 18   Wt 65.3 kg (144 lb)   SpO2 96%   BMI 26.36 kg/m²      Physical Exam  Constitutional:       General: She is not in acute distress.  HENT:      Head: Normocephalic.      Nose: Nose normal.      Mouth/Throat:      Mouth: Mucous membranes are moist.   Eyes:      Extraocular Movements: Extraocular movements intact.      Pupils: Pupils are equal, round, and reactive to light.   Cardiovascular:      Rate and Rhythm: Normal rate and regular rhythm.      Pulses: Normal pulses.   Pulmonary:      Effort: Pulmonary effort is normal.      Breath sounds: Normal breath sounds.   Abdominal:      General: Bowel sounds are normal.      Palpations:  Abdomen is soft.      Tenderness: There is no abdominal tenderness. There is no guarding.   Genitourinary:     Comments: Voiding  Musculoskeletal:         General: Normal range of motion.      Cervical back: Normal range of motion.      Comments: L arm sling maintained. Brisk capillary refill in all fingers and thumb.   Skin:     General: Skin is warm and dry.      Capillary Refill: Capillary refill takes less than 2 seconds.   Neurological:      General: No focal deficit present.      Mental Status: She is alert and oriented to person, place, and time.   Psychiatric:         Mood and Affect: Mood normal.         Behavior: Behavior normal.        Medications reviewed during visit at facility.  Aspirin 81 mg po daily  Omeprazole 10 mg po daily  Vitamin C 500 mg po daily  Amlodipine 10 mg po daily  Alprazolam 0.25 1/2 tablet po daily  Lasix 20 mg Mon, Weds, and Friday  Atorvastatin 80 mg po daily  Dapagliflozin 10 mg po daily  Losartan 100 mg po daily  Tylenol 650 mg po q 4 hours prn  Glimepiride 4 mg po bid  Metoprolol tartrate 75 mg po bid  Lovasa 1 gram two capsules po daily  Levothyroxine 100 mcg po daily  Miralax 17 grams po daily  Biotin 5 mg po daily  Folic acid 1 mg po daily    Labs reviewed at facility:   Contains abnormal data Thyroxine, Free  Order: [id number]  Status: Final result       Visible to patient: No (inaccessible in [facility])    0 Result Notes       Component  Ref Range & Units 5 mo ago 6 mo ago   Free T4  0.61 - 1.12 ng/dL 1.36 High  1.39 High  CM   Comment:  Thyroxine Free testing is performed using different testing   methodology at [facility] than at other   [facility] hospitals. Direct result comparisons should   only be made within the same method.  .   Biotin can cause falsely elevated free T4 results. Patients   taking a Biotin dose of up to 10 mg/day should refrain from   taking Biotin for 24 hours before sample collection. Patient   taking a Biotin dose of >10 mg/day should  consult with their   physician or the laboratory before the blood draw.   Resulting Agency [facility]              Specimen Collected: 23 05:51 Last Resulted: 23 08:08            Laboratory Service Report [id number]   Patient Name   ESA MCNALLY  Patient ID   [id number]  Age   89 Y  Gender   F  Order #      Ordering Phys   DILIP DONNELLY  Patient Telephone #   N/A     [phone]  AKA      Client Order #   [id number]   Collection Date and Time   2023 06:30   Print Date and Time   2023 18:17  Account Information   [facility] NR   [id number] [place] [address]     Report Notes                  Test Results   Reference Perform  Unit  Value Site*             CBC and Differential  REPORTED 2023 10:39  White Blood Cell Count   5.19 k/uL 3.70-11.00    RBC   4.32 m/uL 3.90-5.20    Hemoglobin   12.7 g/dL 11.5-15.5    Hematocrit   37.5 % 36.0-46.0    MCV   86.8 fL 80.0-100.0    MCH   29.4 pg 26.0-34.0    MCHC   33.9 g/dL 30.5-36.0    RDW-CV   14.8 % 11.5-15.0    Platelet Count   302 k/uL 150-400    MPV   10.2 fL 9.0-12.7    Neut%   58.5 %    Abs Neut   3.04 k/uL 1.45-7.50    Lymph%   25.2 %    Abs Lymph   1.31 k/uL 1.00-4.00    Mono%   8.5 %    Abs Mono   0.44 k/uL <0.87    Eosin%   6.6 %    Abs Eosin   0.34 k/uL <0.46    Baso%   1.0 %    Abs Baso   0.05 k/uL <0.11    Immature Gran %%   0.2 %    Abs Immature Gran   <0.03 k/uL <0.10    NRBC   0.0 /100 WBC    Absolute nRBC   <0.01 k/uL <0.01    Diff Type   Auto               Comp Metabolic Panel  REPORTED 2023 10:51  Protein, Total   6.8 g/dL 6.3-8.0    Albumin L 3.2 g/dL 3.9-4.9    Calcium, Total   9.1 mg/dL 8.5-10.2    Bilirubin, Total   0.6 mg/dL 0.2-1.3    Alkaline Phosphatase H 137 U/L     AST   31 U/L 13-35    ALT   32 U/L 7-38    Glucose H 190 mg/dL 74-99  BUN   13 mg/dL 7-21    Creatinine L 0.49 mg/dL 0.58-0.96    Sodium   140 mmol/L 136-144     Potassium L 3.6 mmol/L 3.7-5.1    Chloride   104 mmol/L     CO2   24 mmol/L 22-30    Anion Gap   12 mmol/L 9-18    Estimated Glomerular Filtration Rate   87 mL/min/1.73 meters squared >=60    Assessment/Plan    Problem List Items Addressed This Visit       Stage 3 chronic kidney disease, unspecified whether stage 3a or 3b CKD (CMS/[facility])     Review labs. CMP CBC with diff q week.         Depression with anxiety     Alprazolam 0.25 1/2 tablet po daily         Hypothyroidism     Levothyroxine 100 mcg po daily         Closed displaced comminuted supracondylar fracture of left humerus without intercondylar fracture with routine healing - Primary     Maintain L arm sling. Schedule follow up with Dr. Kris Andrade (ortho) on 12/12/23. NWB status.         Impaired functional mobility, balance, gait, and endurance     PT and OT to assess and treat.            Time:  I spent 45 minutes or greater with the patient. Greater than 50% of this time was spent in counseling and or coordination of care. The time includes prep time of reviewing vital signs, report from direct nursing staff and or therapists, hospital documentation, reviewing labs, radiographs, diagnostic tests and or consultations, time directly spent with the patient interviewing, examining, and education regarding diagnosis, treatments, and medications, as well as documentation in the electronic medical record, and reviewing the plan of care and any new orders with the patient, nursing staff and other staff directly related to the patients care.      Dudley Acosta PA-C

## 2023-12-01 NOTE — LETTER
Patient: Toshia Méndez  : 1929    Encounter Date: 2023  Name: Toshia Méndez  YOB: 1929    Chief complaint: Fall at home. Comminuted left humeral fracture     HPI: This is a 89 year old  female who has a medical history remarkable for CVA , L hemiparesis, falls, CAD, cardiac pacemaker, anxiety, depression, diabetes, stage 3 CKD, hypothyroidism, gerd, PE, gout,and hyperlipidemia. Patient presented to the ER for evaluation of fall at home on 2023. Radiographic studies showed a comminuted left humeral fracture. Patient was seen by orthopedic service who recommend nonoperative management with use of sling and nonweightbearing to the LUE as treatment. She was discharged to SNF for rehab.    Arm Injury   The incident occurred more than 1 week ago. The incident occurred at home. The injury mechanism was a fall. The pain is present in the upper left arm. The quality of the pain is described as aching. The pain is at a severity of 3/10. The pain is moderate. The pain has been Improving since the incident. The symptoms are aggravated by movement. She has tried acetaminophen, immobilization and rest for the symptoms. The treatment provided moderate relief.     Review of systems:   ROS negative except were noted in HPI.    Code Status: full code    /62   Pulse 74   Temp 36.6 °C (97.9 °F)   Resp 18   Wt 65.3 kg (144 lb)   SpO2 96%   BMI 26.36 kg/m²      Physical Exam  Constitutional:       General: She is not in acute distress.  HENT:      Head: Normocephalic.      Nose: Nose normal.      Mouth/Throat:      Mouth: Mucous membranes are moist.   Eyes:      Extraocular Movements: Extraocular movements intact.      Pupils: Pupils are equal, round, and reactive to light.   Cardiovascular:      Rate and Rhythm: Normal rate and regular rhythm.      Pulses: Normal pulses.   Pulmonary:      Effort: Pulmonary effort is normal.      Breath sounds: Normal breath  sounds.   Abdominal:      General: Bowel sounds are normal.      Palpations: Abdomen is soft.      Tenderness: There is no abdominal tenderness. There is no guarding.   Genitourinary:     Comments: Voiding  Musculoskeletal:         General: Normal range of motion.      Cervical back: Normal range of motion.      Comments: L arm sling maintained. Brisk capillary refill in all fingers and thumb.   Skin:     General: Skin is warm and dry.      Capillary Refill: Capillary refill takes less than 2 seconds.   Neurological:      General: No focal deficit present.      Mental Status: She is alert and oriented to person, place, and time.   Psychiatric:         Mood and Affect: Mood normal.         Behavior: Behavior normal.        Medications reviewed during visit at facility.  Aspirin 81 mg po daily  Omeprazole 10 mg po daily  Vitamin C 500 mg po daily  Amlodipine 10 mg po daily  Alprazolam 0.25 1/2 tablet po daily  Lasix 20 mg Mon, Weds, and Friday  Atorvastatin 80 mg po daily  Dapagliflozin 10 mg po daily  Losartan 100 mg po daily  Tylenol 650 mg po q 4 hours prn  Glimepiride 4 mg po bid  Metoprolol tartrate 75 mg po bid  Lovasa 1 gram two capsules po daily  Levothyroxine 100 mcg po daily  Miralax 17 grams po daily  Biotin 5 mg po daily  Folic acid 1 mg po daily    Labs reviewed at facility:   Contains abnormal data Thyroxine, Free  Order: [id number]  Status: Final result       Visible to patient: No (inaccessible in [facility])    0 Result Notes       Component  Ref Range & Units 5 mo ago 6 mo ago   Free T4  0.61 - 1.12 ng/dL 1.36 High  1.39 High  CM   Comment:  Thyroxine Free testing is performed using different testing   methodology at [facility] than at other   [facility] hospitals. Direct result comparisons should   only be made within the same method.  .   Biotin can cause falsely elevated free T4 results. Patients   taking a Biotin dose of up to 10 mg/day should refrain from   taking Biotin for 24 hours  before sample collection. Patient   taking a Biotin dose of >10 mg/day should consult with their   physician or the laboratory before the blood draw.   Resulting Agency [facility]              Specimen Collected: 23 05:51 Last Resulted: 23 08:08            Laboratory Service Report [id number]   Patient Name   ESA MCNALLY  Patient ID   [id number]  Age   89 Y  Gender   F  Order #      Ordering Phys   CONYDILIP C  Patient Telephone #   N/A     [phone]  AKA      Client Order #   [id number]   Collection Date and Time   2023 06:30   Print Date and Time   2023 18:17  Account Information   [facility] NR   [id number] [place] [address]     Report Notes                  Test Results   Reference Perform  Unit  Value Site*             CBC and Differential  REPORTED 2023 10:39  White Blood Cell Count   5.19 k/uL 3.70-11.00    RBC   4.32 m/uL 3.90-5.20    Hemoglobin   12.7 g/dL 11.5-15.5    Hematocrit   37.5 % 36.0-46.0    MCV   86.8 fL 80.0-100.0    MCH   29.4 pg 26.0-34.0    MCHC   33.9 g/dL 30.5-36.0    RDW-CV   14.8 % 11.5-15.0    Platelet Count   302 k/uL 150-400    MPV   10.2 fL 9.0-12.7    Neut%   58.5 %    Abs Neut   3.04 k/uL 1.45-7.50    Lymph%   25.2 %    Abs Lymph   1.31 k/uL 1.00-4.00    Mono%   8.5 %    Abs Mono   0.44 k/uL <0.87    Eosin%   6.6 %    Abs Eosin   0.34 k/uL <0.46    Baso%   1.0 %    Abs Baso   0.05 k/uL <0.11    Immature Gran %%   0.2 %    Abs Immature Gran   <0.03 k/uL <0.10    NRBC   0.0 /100 WBC    Absolute nRBC   <0.01 k/uL <0.01    Diff Type   Auto               Comp Metabolic Panel  REPORTED 2023 10:51  Protein, Total   6.8 g/dL 6.3-8.0    Albumin L 3.2 g/dL 3.9-4.9    Calcium, Total   9.1 mg/dL 8.5-10.2    Bilirubin, Total   0.6 mg/dL 0.2-1.3    Alkaline Phosphatase H 137 U/L     AST   31 U/L 13-35    ALT   32 U/L 7-38    Glucose H 190 mg/dL 74-99  BUN   13 mg/dL 7-21     Creatinine L 0.49 mg/dL 0.58-0.96    Sodium   140 mmol/L 136-144    Potassium L 3.6 mmol/L 3.7-5.1    Chloride   104 mmol/L     CO2   24 mmol/L 22-30    Anion Gap   12 mmol/L 9-18    Estimated Glomerular Filtration Rate   87 mL/min/1.73 meters squared >=60    Assessment/Plan   Problem List Items Addressed This Visit       Stage 3 chronic kidney disease, unspecified whether stage 3a or 3b CKD (CMS/[facility])     Review labs. CMP CBC with diff q week.         Depression with anxiety     Alprazolam 0.25 1/2 tablet po daily         Hypothyroidism     Levothyroxine 100 mcg po daily         Closed displaced comminuted supracondylar fracture of left humerus without intercondylar fracture with routine healing - Primary     Maintain L arm sling. Schedule follow up with Dr. Kris Andrade (ortho) on 12/12/23. NWB status.         Impaired functional mobility, balance, gait, and endurance     PT and OT to assess and treat.            Time:  I spent 45 minutes or greater with the patient. Greater than 50% of this time was spent in counseling and or coordination of care. The time includes prep time of reviewing vital signs, report from direct nursing staff and or therapists, hospital documentation, reviewing labs, radiographs, diagnostic tests and or consultations, time directly spent with the patient interviewing, examining, and education regarding diagnosis, treatments, and medications, as well as documentation in the electronic medical record, and reviewing the plan of care and any new orders with the patient, nursing staff and other staff directly related to the patients care.      Dudley Acosta PA-C       Electronically Signed By: Dudley Acosta PA-C   12/3/23  5:34 PM

## 2023-12-03 PROBLEM — Z74.09 IMPAIRED FUNCTIONAL MOBILITY, BALANCE, GAIT, AND ENDURANCE: Status: ACTIVE | Noted: 2023-12-03

## 2023-12-06 ENCOUNTER — NURSING HOME VISIT (OUTPATIENT)
Dept: POST ACUTE CARE | Facility: EXTERNAL LOCATION | Age: 88
End: 2023-12-06
Payer: MEDICARE

## 2023-12-06 DIAGNOSIS — E11.69 DM TYPE 2 WITH DIABETIC MIXED HYPERLIPIDEMIA (MULTI): ICD-10-CM

## 2023-12-06 DIAGNOSIS — I63.9 ACUTE CEREBROVASCULAR ACCIDENT (MULTI): ICD-10-CM

## 2023-12-06 DIAGNOSIS — Z95.0 PACEMAKER: ICD-10-CM

## 2023-12-06 DIAGNOSIS — E78.2 DM TYPE 2 WITH DIABETIC MIXED HYPERLIPIDEMIA (MULTI): ICD-10-CM

## 2023-12-06 DIAGNOSIS — F41.8 DEPRESSION WITH ANXIETY: ICD-10-CM

## 2023-12-06 DIAGNOSIS — S42.422D: Primary | ICD-10-CM

## 2023-12-06 PROCEDURE — 99308 SBSQ NF CARE LOW MDM 20: CPT | Performed by: INTERNAL MEDICINE

## 2023-12-06 NOTE — LETTER
Patient: Toshia Méndez  : 1929    Encounter Date: 2023    Subjective  Patient ID: Toshia Méndez is a 89 y.o. female who is acute skilled care being seen and evaluated for multiple medical problems.    Patient was seen for follow-up and is admitted after having fallen the fracture of left humerus.  Patient's creatinine is 0.49 hemoglobin is 12.7.  Patient's blood sugar is 218.  Patient remains on losartan, Lasix, metoprolol, glimepiride, Xanax, atorvastatin, amlodipine, trifluoperazine, chlorpromazine, folic acid.  Serial blood sugars are reviewed, it has been fluctuating.  Patient has old stroke related old deficit unchanged.  Patient is unable to ambulate, deformity of left shoulder persist, pain and discomfort persist, physical therapy progress was not significant.  Pain control is reasonable, patient is elderly female patient has a stroke in May received thrombolytic, limited mobility high risk of fall in the fall and humerus fracture which could be insufficiency fracture.  BP readings are not out of ordinary and patient remains on 3 antihypertensives and significant amount of antipsychotics and anxiolytics.         Review of Systems   Constitutional:  Negative for activity change and fatigue.   Respiratory:  Negative for apnea, cough, choking and shortness of breath.    Cardiovascular:  Negative for leg swelling.   Gastrointestinal:  Negative for constipation, diarrhea and nausea.   Musculoskeletal:  Positive for arthralgias and gait problem.   Neurological:  Positive for weakness.   Hematological:  Bruises/bleeds easily.   Psychiatric/Behavioral:  Negative for confusion.        Objective  /68   Pulse 74   Wt 64.4 kg (142 lb)   BMI 26.00 kg/m²     Physical Exam  Constitutional:       General: She is not in acute distress.     Appearance: Normal appearance. She is normal weight. She is not ill-appearing.   HENT:      Head: Normocephalic.   Eyes:      Conjunctiva/sclera:  Conjunctivae normal.   Cardiovascular:      Rate and Rhythm: Normal rate and regular rhythm.      Heart sounds: Normal heart sounds.   Pulmonary:      Breath sounds: Normal breath sounds.   Abdominal:      General: Abdomen is flat.      Palpations: Abdomen is soft.   Musculoskeletal:         General: Tenderness present.      Left upper arm: Swelling, edema, deformity and tenderness present.      Cervical back: Neck supple.   Skin:     General: Skin is warm and dry.   Neurological:      General: No focal deficit present.      Mental Status: She is oriented to person, place, and time. Mental status is at baseline.      Motor: Weakness present.   Psychiatric:         Mood and Affect: Mood normal.      Assessment/Plan  Problem List Items Addressed This Visit             ICD-10-CM    Depression with anxiety F41.8    Pacemaker Z95.0    DM type 2 with diabetic mixed hyperlipidemia (CMS/[facility]) E11.69, E78.2    Closed displaced comminuted supracondylar fracture of left humerus without intercondylar fracture with routine healing - Primary S42.422D     Other Visit Diagnoses         Codes    Acute cerebrovascular accident (CMS/[facility])     I63.9        Some bowel disturbance were present no diarrhea.  There is no significant abnormalities in the lab , oral intake is fluctuating.  Shoulder sling needs to be kept, hydration needs to be maintained.  Metoprolol, losartan, amlodipine are the medications for hypertension, no nosocomial infection, diabetes regimen will be continued, she has history of pacemaker, she has fluctuating renal functions, functional status and endurance remains impaired.  No agitation restlessness, no evidence of any confusion hallucinations or delusions.  Not sure about the long-term disposition of this patient but at this time she will continue to receive skilled nursing and rehabilitation in a close supervised setting.  Patient remains anxious but able to converse meaningfully and communicate meaningfully.      Goals    None           Electronically Signed By: Jamal Villagran MD   12/7/23 10:03 PM

## 2023-12-07 VITALS
HEART RATE: 74 BPM | DIASTOLIC BLOOD PRESSURE: 68 MMHG | BODY MASS INDEX: 26 KG/M2 | WEIGHT: 142 LBS | SYSTOLIC BLOOD PRESSURE: 112 MMHG

## 2023-12-07 ASSESSMENT — ENCOUNTER SYMPTOMS
CONFUSION: 0
APNEA: 0
CHOKING: 0
ARTHRALGIAS: 1
FATIGUE: 0
BRUISES/BLEEDS EASILY: 1
ACTIVITY CHANGE: 0
SHORTNESS OF BREATH: 0
DIARRHEA: 0
NAUSEA: 0
WEAKNESS: 1
CONSTIPATION: 0
COUGH: 0

## 2023-12-08 NOTE — PROGRESS NOTES
Subjective   Patient ID: Toshia Méndez is a 89 y.o. female who is acute skilled care being seen and evaluated for multiple medical problems.    Patient was seen for follow-up and is admitted after having fallen the fracture of left humerus.  Patient's creatinine is 0.49 hemoglobin is 12.7.  Patient's blood sugar is 218.  Patient remains on losartan, Lasix, metoprolol, glimepiride, Xanax, atorvastatin, amlodipine, trifluoperazine, chlorpromazine, folic acid.  Serial blood sugars are reviewed, it has been fluctuating.  Patient has old stroke related old deficit unchanged.  Patient is unable to ambulate, deformity of left shoulder persist, pain and discomfort persist, physical therapy progress was not significant.  Pain control is reasonable, patient is elderly female patient has a stroke in May received thrombolytic, limited mobility high risk of fall in the fall and humerus fracture which could be insufficiency fracture.  BP readings are not out of ordinary and patient remains on 3 antihypertensives and significant amount of antipsychotics and anxiolytics.         Review of Systems   Constitutional:  Negative for activity change and fatigue.   Respiratory:  Negative for apnea, cough, choking and shortness of breath.    Cardiovascular:  Negative for leg swelling.   Gastrointestinal:  Negative for constipation, diarrhea and nausea.   Musculoskeletal:  Positive for arthralgias and gait problem.   Neurological:  Positive for weakness.   Hematological:  Bruises/bleeds easily.   Psychiatric/Behavioral:  Negative for confusion.        Objective   /68   Pulse 74   Wt 64.4 kg (142 lb)   BMI 26.00 kg/m²     Physical Exam  Constitutional:       General: She is not in acute distress.     Appearance: Normal appearance. She is normal weight. She is not ill-appearing.   HENT:      Head: Normocephalic.   Eyes:      Conjunctiva/sclera: Conjunctivae normal.   Cardiovascular:      Rate and Rhythm: Normal rate and regular  rhythm.      Heart sounds: Normal heart sounds.   Pulmonary:      Breath sounds: Normal breath sounds.   Abdominal:      General: Abdomen is flat.      Palpations: Abdomen is soft.   Musculoskeletal:         General: Tenderness present.      Left upper arm: Swelling, edema, deformity and tenderness present.      Cervical back: Neck supple.   Skin:     General: Skin is warm and dry.   Neurological:      General: No focal deficit present.      Mental Status: She is oriented to person, place, and time. Mental status is at baseline.      Motor: Weakness present.   Psychiatric:         Mood and Affect: Mood normal.      Assessment/Plan   Problem List Items Addressed This Visit             ICD-10-CM    Depression with anxiety F41.8    Pacemaker Z95.0    DM type 2 with diabetic mixed hyperlipidemia (CMS/[facility]) E11.69, E78.2    Closed displaced comminuted supracondylar fracture of left humerus without intercondylar fracture with routine healing - Primary S42.422D     Other Visit Diagnoses         Codes    Acute cerebrovascular accident (CMS/[facility])     I63.9        Some bowel disturbance were present no diarrhea.  There is no significant abnormalities in the lab , oral intake is fluctuating.  Shoulder sling needs to be kept, hydration needs to be maintained.  Metoprolol, losartan, amlodipine are the medications for hypertension, no nosocomial infection, diabetes regimen will be continued, she has history of pacemaker, she has fluctuating renal functions, functional status and endurance remains impaired.  No agitation restlessness, no evidence of any confusion hallucinations or delusions.  Not sure about the long-term disposition of this patient but at this time she will continue to receive skilled nursing and rehabilitation in a close supervised setting.  Patient remains anxious but able to converse meaningfully and communicate meaningfully.     Goals    None

## 2023-12-12 ENCOUNTER — NURSING HOME VISIT (OUTPATIENT)
Dept: POST ACUTE CARE | Facility: EXTERNAL LOCATION | Age: 88
End: 2023-12-12
Payer: MEDICARE

## 2023-12-12 VITALS
BODY MASS INDEX: 25.34 KG/M2 | RESPIRATION RATE: 18 BRPM | WEIGHT: 143 LBS | SYSTOLIC BLOOD PRESSURE: 142 MMHG | DIASTOLIC BLOOD PRESSURE: 72 MMHG | TEMPERATURE: 97.8 F | HEART RATE: 79 BPM | OXYGEN SATURATION: 96 % | HEIGHT: 63 IN

## 2023-12-12 DIAGNOSIS — Z74.09 IMPAIRED FUNCTIONAL MOBILITY, BALANCE, GAIT, AND ENDURANCE: Primary | ICD-10-CM

## 2023-12-12 DIAGNOSIS — S42.202P CLOSED FRACTURE OF PROXIMAL END OF LEFT HUMERUS WITH MALUNION, UNSPECIFIED FRACTURE MORPHOLOGY, SUBSEQUENT ENCOUNTER: ICD-10-CM

## 2023-12-12 DIAGNOSIS — E11.40 TYPE 2 DIABETES MELLITUS WITH DIABETIC NEUROPATHY, WITHOUT LONG-TERM CURRENT USE OF INSULIN (MULTI): ICD-10-CM

## 2023-12-12 PROCEDURE — 99308 SBSQ NF CARE LOW MDM 20: CPT | Performed by: PHYSICIAN ASSISTANT

## 2023-12-12 NOTE — LETTER
"Patient: Toshia Méndez  : 1929    Encounter Date: 2023  Name: Toshia Méndez  YOB: 1929    Chief complaint: Impaired mobility. L humerus fracture.    HPI: Patient seen and examined in her room. She is seated at time of exam. Physical therapy reports patient is making steady progress. She is now able to walk 40-50 feet during supervised ambulation. Non-weight bearing maintained for L arm. Patient reports she is comfortable on current medications.    Arm Injury   The incident occurred more than 1 week ago. The incident occurred at home. The injury mechanism was a fall. Pain location: L upper arm. The quality of the pain is described as aching. The pain is at a severity of 3/10. The pain is moderate. The pain has been Improving since the incident. The symptoms are aggravated by movement. She has tried rest, immobilization and acetaminophen for the symptoms. The treatment provided moderate relief.     Review of systems:   ROS negative except were noted in HPI.    Code Status: full code    /72   Pulse 79   Temp 36.6 °C (97.8 °F)   Resp 18   Ht 1.6 m (5' 3\")   Wt 64.9 kg (143 lb)   SpO2 96%   BMI 25.33 kg/m²       Physical Exam  Constitutional:       General: She is not in acute distress.  HENT:      Head: Normocephalic.      Nose: Nose normal.      Mouth/Throat:      Mouth: Mucous membranes are moist.   Eyes:      Extraocular Movements: Extraocular movements intact.      Pupils: Pupils are equal, round, and reactive to light.   Cardiovascular:      Rate and Rhythm: Normal rate and regular rhythm.      Pulses: Normal pulses.   Pulmonary:      Effort: Pulmonary effort is normal.      Breath sounds: Normal breath sounds.   Abdominal:      General: Bowel sounds are normal.      Palpations: Abdomen is soft.      Tenderness: There is no abdominal tenderness. There is no guarding.   Genitourinary:     Comments: Voiding  Musculoskeletal:         General: Normal range " of motion.      Cervical back: Normal range of motion.      Comments: L arm sling maintained. Brisk capillary refill in all fingers and thumb.   Skin:     General: Skin is warm and dry.      Capillary Refill: Capillary refill takes less than 2 seconds.   Neurological:      General: No focal deficit present.      Mental Status: She is alert and oriented to person, place, and time.   Psychiatric:         Mood and Affect: Mood normal.         Behavior: Behavior normal.     Medications reviewed during visit at facility.  Aspirin 81 mg po daily  Omeprazole 10 mg po daily  Vitamin C 500 mg po daily  Amlodipine 10 mg po daily  Alprazolam 0.25 1/2 tablet po daily  Lasix 20 mg Mon, , and Friday  Atorvastatin 80 mg po daily  Dapagliflozin 10 mg po daily  Losartan 100 mg po daily  Tylenol 650 mg po q 4 hours prn  Glimepiride 4 mg po bid  Metoprolol tartrate 75 mg po bid  Lovasa 1 gram two capsules po daily  Levothyroxine 100 mcg po daily  Miralax 17 grams po daily  Biotin 5 mg po daily  Folic acid 1 mg po naina  Labs reviewed at facility:     Laboratory Service Report [id number]   Patient Name   ESA MCNALLY  Patient ID   [id number]  Age   89 Y  Gender   F  Order #      Ordering Phys   DILIP DONNELLY  Patient Telephone #   N/A     [phone]  AKA      Client Order #   [id number]   Collection Date and Time   2023 09:05   Print Date and Time   2023 22:48  Account Information   [facility] NR   [id number] [place] [address]     Report Notes                 Test Results   Reference Perform  Unit  Value Site*             CBC and Differential  REPORTED 2023 11:23  White Blood Cell Count   7.08 k/uL 3.70-11.00    RBC   4.49 m/uL 3.90-5.20    Hemoglobin   13.3 g/dL 11.5-15.5    Hematocrit   39.8 % 36.0-46.0    MCV   88.6 fL 80.0-100.0    MCH   29.6 pg 26.0-34.0    MCHC   33.4 g/dL 30.5-36.0    RDW-CV H 15.7 % 11.5-15.0    Platelet Count   335 k/uL 150-400    MPV   10.0 fL  9.0-12.7    Neut%   73.3 %    Abs Neut   5.19 k/uL 1.45-7.50    Lymph%   14.8 %    Abs Lymph   1.05 k/uL 1.00-4.00    Mono%   5.8 %    Abs Mono   0.41 k/uL <0.87    Eosin%   4.8 %    Abs Eosin   0.34 k/uL <0.46    Baso%   1.0 %    Abs Baso   0.07 k/uL <0.11    Immature Gran %%   0.3 %    Abs Immature Gran   <0.03 k/uL <0.10    NRBC   0.0 /100 WBC    Absolute nRBC   <0.01 k/uL <0.01    Diff Type   Auto               Comp Metabolic Panel  REPORTED 12/08/2023 12:13  Protein, Total   6.9 g/dL 6.3-8.0    Albumin L 3.5 g/dL 3.9-4.9    Calcium, Total   9.1 mg/dL 8.5-10.2    Bilirubin, Total   0.6 mg/dL 0.2-1.3    Alkaline Phosphatase H 242 U/L     AST H 55 U/L 13-35    ALT H 85 U/L 7-38    Glucose H 298 mg/dL 74-99  BUN   13 mg/dL 7-21    Creatinine L 0.53 mg/dL 0.58-0.96    Sodium   139 mmol/L 136-144    Potassium   3.9 mmol/L 3.7-5.1    Chloride   103 mmol/L     CO2   22 mmol/L 22-30    Anion Gap   14 mmol/L 9-18    Estimated Glomerular Filtration Rate   86 mL/min/1.73 meters squared >=60   Assessment/Plan   Problem List Items Addressed This Visit       Diabetes mellitus, type II (CMS/[facility])     Review blood sugars. Blood sugar today 244. Continue with Glimepiride 4 mg po bid. HgbA1C 8.3 on 6/7/2023.         Closed fracture of proximal end of left humerus with malunion, unspecified fracture morphology, subsequent encounter     Medicate for pain. Maintain L arm sling. Schedule follow up appointment with orthopedic service.         Impaired functional mobility, balance, gait, and endurance - Primary     Review physical and occupational therapy notes. Maintain L arm sling.            Time:    Dudley Acosta PA-C       Electronically Signed By: Dudley Acosta PA-C   12/14/23 10:33 AM

## 2023-12-13 NOTE — PROGRESS NOTES
"12/14/23  Name: Toshia Méndez  YOB: 1929    Chief complaint: Impaired mobility. L humerus fracture.    HPI: Patient seen and examined in her room. She is seated at time of exam. Physical therapy reports patient is making steady progress. She is now able to walk 40-50 feet during supervised ambulation. Non-weight bearing maintained for L arm. Patient reports she is comfortable on current medications.    Arm Injury   The incident occurred more than 1 week ago. The incident occurred at home. The injury mechanism was a fall. Pain location: L upper arm. The quality of the pain is described as aching. The pain is at a severity of 3/10. The pain is moderate. The pain has been Improving since the incident. The symptoms are aggravated by movement. She has tried rest, immobilization and acetaminophen for the symptoms. The treatment provided moderate relief.     Review of systems:   ROS negative except were noted in HPI.    Code Status: full code    /72   Pulse 79   Temp 36.6 °C (97.8 °F)   Resp 18   Ht 1.6 m (5' 3\")   Wt 64.9 kg (143 lb)   SpO2 96%   BMI 25.33 kg/m²       Physical Exam  Constitutional:       General: She is not in acute distress.  HENT:      Head: Normocephalic.      Nose: Nose normal.      Mouth/Throat:      Mouth: Mucous membranes are moist.   Eyes:      Extraocular Movements: Extraocular movements intact.      Pupils: Pupils are equal, round, and reactive to light.   Cardiovascular:      Rate and Rhythm: Normal rate and regular rhythm.      Pulses: Normal pulses.   Pulmonary:      Effort: Pulmonary effort is normal.      Breath sounds: Normal breath sounds.   Abdominal:      General: Bowel sounds are normal.      Palpations: Abdomen is soft.      Tenderness: There is no abdominal tenderness. There is no guarding.   Genitourinary:     Comments: Voiding  Musculoskeletal:         General: Normal range of motion.      Cervical back: Normal range of motion.      Comments: L arm " sling maintained. Brisk capillary refill in all fingers and thumb.   Skin:     General: Skin is warm and dry.      Capillary Refill: Capillary refill takes less than 2 seconds.   Neurological:      General: No focal deficit present.      Mental Status: She is alert and oriented to person, place, and time.   Psychiatric:         Mood and Affect: Mood normal.         Behavior: Behavior normal.     Medications reviewed during visit at facility.  Aspirin 81 mg po daily  Omeprazole 10 mg po daily  Vitamin C 500 mg po daily  Amlodipine 10 mg po daily  Alprazolam 0.25 1/2 tablet po daily  Lasix 20 mg Mon, , and Friday  Atorvastatin 80 mg po daily  Dapagliflozin 10 mg po daily  Losartan 100 mg po daily  Tylenol 650 mg po q 4 hours prn  Glimepiride 4 mg po bid  Metoprolol tartrate 75 mg po bid  Lovasa 1 gram two capsules po daily  Levothyroxine 100 mcg po daily  Miralax 17 grams po daily  Biotin 5 mg po daily  Folic acid 1 mg po naina  Labs reviewed at facility:     Laboratory Service Report [id number]   Patient Name   ESA MCNALLY  Patient ID   [id number]  Age   89 Y  Gender   F  Order #      Ordering Phys   DILIP DONNELLY  Patient Telephone #   N/A     1929  AKA      Client Order #   [id number]   Collection Date and Time   2023 09:05   Print Date and Time   2023 22:48  Account Information   [facility] NR   [id number] [address] [place] [id number]     Report Notes                 Test Results   Reference Perform  Unit  Value Site*             CBC and Differential  REPORTED 2023 11:23  White Blood Cell Count   7.08 k/uL 3.70-11.00    RBC   4.49 m/uL 3.90-5.20    Hemoglobin   13.3 g/dL 11.5-15.5    Hematocrit   39.8 % 36.0-46.0    MCV   88.6 fL 80.0-100.0    MCH   29.6 pg 26.0-34.0    MCHC   33.4 g/dL 30.5-36.0    RDW-CV H 15.7 % 11.5-15.0    Platelet Count   335 k/uL 150-400    MPV   10.0 fL 9.0-12.7    Neut%   73.3 %    Abs Neut   5.19 k/uL 1.45-7.50    Lymph%   14.8 %     Abs Lymph   1.05 k/uL 1.00-4.00    Mono%   5.8 %    Abs Mono   0.41 k/uL <0.87    Eosin%   4.8 %    Abs Eosin   0.34 k/uL <0.46    Baso%   1.0 %    Abs Baso   0.07 k/uL <0.11    Immature Gran %%   0.3 %    Abs Immature Gran   <0.03 k/uL <0.10    NRBC   0.0 /100 WBC    Absolute nRBC   <0.01 k/uL <0.01    Diff Type   Auto               Comp Metabolic Panel  REPORTED 12/08/2023 12:13  Protein, Total   6.9 g/dL 6.3-8.0    Albumin L 3.5 g/dL 3.9-4.9    Calcium, Total   9.1 mg/dL 8.5-10.2    Bilirubin, Total   0.6 mg/dL 0.2-1.3    Alkaline Phosphatase H 242 U/L     AST H 55 U/L 13-35    ALT H 85 U/L 7-38    Glucose H 298 mg/dL 74-99  BUN   13 mg/dL 7-21    Creatinine L 0.53 mg/dL 0.58-0.96    Sodium   139 mmol/L 136-144    Potassium   3.9 mmol/L 3.7-5.1    Chloride   103 mmol/L     CO2   22 mmol/L 22-30    Anion Gap   14 mmol/L 9-18    Estimated Glomerular Filtration Rate   86 mL/min/1.73 meters squared >=60   Assessment/Plan    Problem List Items Addressed This Visit       Diabetes mellitus, type II (CMS/[facility])     Review blood sugars. Blood sugar today 244. Continue with Glimepiride 4 mg po bid. HgbA1C 8.3 on 6/7/2023.         Closed fracture of proximal end of left humerus with malunion, unspecified fracture morphology, subsequent encounter     Medicate for pain. Maintain L arm sling. Schedule follow up appointment with orthopedic service.         Impaired functional mobility, balance, gait, and endurance - Primary     Review physical and occupational therapy notes. Maintain L arm sling.            Time:    Dudley Acosta PA-C

## 2023-12-14 NOTE — ASSESSMENT & PLAN NOTE
Review blood sugars. Blood sugar today 244. Continue with Glimepiride 4 mg po bid. HgbA1C 8.3 on 6/7/2023.

## 2023-12-17 ENCOUNTER — NURSING HOME VISIT (OUTPATIENT)
Dept: POST ACUTE CARE | Facility: EXTERNAL LOCATION | Age: 88
End: 2023-12-17
Payer: MEDICARE

## 2023-12-17 VITALS — DIASTOLIC BLOOD PRESSURE: 79 MMHG | SYSTOLIC BLOOD PRESSURE: 136 MMHG | HEART RATE: 79 BPM

## 2023-12-17 DIAGNOSIS — Z95.0 PACEMAKER: ICD-10-CM

## 2023-12-17 DIAGNOSIS — E11.69 DM TYPE 2 WITH DIABETIC MIXED HYPERLIPIDEMIA (MULTI): ICD-10-CM

## 2023-12-17 DIAGNOSIS — Z86.73 OLD CEREBROVASCULAR ACCIDENT (CVA) WITHOUT LATE EFFECT: ICD-10-CM

## 2023-12-17 DIAGNOSIS — Z74.09 IMPAIRED FUNCTIONAL MOBILITY, BALANCE, AND ENDURANCE: ICD-10-CM

## 2023-12-17 DIAGNOSIS — E78.2 DM TYPE 2 WITH DIABETIC MIXED HYPERLIPIDEMIA (MULTI): ICD-10-CM

## 2023-12-17 DIAGNOSIS — S42.202P CLOSED FRACTURE OF PROXIMAL END OF LEFT HUMERUS WITH MALUNION, UNSPECIFIED FRACTURE MORPHOLOGY, SUBSEQUENT ENCOUNTER: Primary | ICD-10-CM

## 2023-12-17 DIAGNOSIS — N18.30 STAGE 3 CHRONIC KIDNEY DISEASE, UNSPECIFIED WHETHER STAGE 3A OR 3B CKD (MULTI): ICD-10-CM

## 2023-12-17 PROCEDURE — 99308 SBSQ NF CARE LOW MDM 20: CPT | Performed by: INTERNAL MEDICINE

## 2023-12-17 ASSESSMENT — ENCOUNTER SYMPTOMS
ARTHRALGIAS: 1
CONSTIPATION: 0
CHOKING: 0
WEAKNESS: 1
BACK PAIN: 0
FATIGUE: 0
COUGH: 0
VOMITING: 0
ABDOMINAL PAIN: 0
SHORTNESS OF BREATH: 0
DIARRHEA: 0
PSYCHIATRIC NEGATIVE: 1
ACTIVITY CHANGE: 0
DIZZINESS: 0

## 2023-12-17 NOTE — LETTER
Patient: Toshia Méndez  : 1929    Encounter Date: 2023    Subjective  Patient ID: Toshia Méndez is a 89 y.o. female who is acute skilled care being seen and evaluated for multiple medical problems.    Patient was seen for follow-up, today she was sitting quietly and not anxious, then I asked patient that how she is doing and what is the plan for discharge and she says that most likely she will going to assisted living facility, we reviewed the orthopedic report and the fracture fragments are displaced, nonsurgical option has been referred and maintained, this fracture is going to leave her deformity and impaired range of motion.  Then we reviewed the patient's blood sugars, patient sustainability mobility and ADLs were reviewed.  Patient's laboratories were reviewed, she remains calm and comfortable, old stroke related deficit remains, no falls, no nosocomial infection, no new events or concerns as much as I have been informed on my phone calls and from the nursing staff.         Review of Systems   Constitutional:  Negative for activity change and fatigue.   Respiratory:  Negative for cough, choking and shortness of breath.    Cardiovascular:  Negative for leg swelling.   Gastrointestinal:  Negative for abdominal pain, constipation, diarrhea and vomiting.   Musculoskeletal:  Positive for arthralgias and gait problem. Negative for back pain.   Neurological:  Positive for weakness. Negative for dizziness.   Psychiatric/Behavioral: Negative.         Objective  /79   Pulse 79     Physical Exam  Vitals reviewed.   Constitutional:       Appearance: Normal appearance. She is normal weight.   HENT:      Head: Normocephalic.   Eyes:      Conjunctiva/sclera: Conjunctivae normal.   Cardiovascular:      Rate and Rhythm: Normal rate and regular rhythm.   Pulmonary:      Effort: Pulmonary effort is normal.      Breath sounds: Normal breath sounds.   Abdominal:      Palpations: Abdomen is soft.    Musculoskeletal:         General: Tenderness and deformity present. No swelling.      Cervical back: Neck supple.   Skin:     General: Skin is warm and dry.   Neurological:      Motor: Weakness present.   Psychiatric:         Mood and Affect: Mood normal.         Assessment/Plan  Problem List Items Addressed This Visit             ICD-10-CM    Stage 3 chronic kidney disease, unspecified whether stage 3a or 3b CKD (CMS/[facility]) N18.30    Pacemaker Z95.0    DM type 2 with diabetic mixed hyperlipidemia (CMS/[facility]) E11.69, E78.2    Old cerebrovascular accident (CVA) without late effect Z86.73    Closed fracture of proximal end of left humerus with malunion, unspecified fracture morphology, subsequent encounter - Primary S42.202P     Other Visit Diagnoses         Codes    Impaired functional mobility, balance, and endurance     Z74.09        Nursing staff are asking me that family wants to know the prognosis so as far as fracture is concerned it is going to relieve her pain, deformity and limited range of motion, as far as residual mobility is concerned she will not be able to use left upper extremity much, transition to assisted living facility would be appropriate, previously patient was homebound, we will do remember she sustained a stroke also in the early part of the year, she is elderly, she has impaired functional mobility and patient has a fluctuating renal functions and not absolutely normal ambulation.  Previously patient has a pacemaker that is not an issue.  Most likely will require assisted living sort of care, till then skilled nursing related care and follow-up will be continued, laboratories will be monitored and continued, pain and discomfort is unchanged.     Goals    None           Electronically Signed By: Jamal Villagran MD   12/17/23  5:03 PM

## 2023-12-17 NOTE — PROGRESS NOTES
Subjective   Patient ID: Toshia Méndez is a 89 y.o. female who is acute skilled care being seen and evaluated for multiple medical problems.    Patient was seen for follow-up, today she was sitting quietly and not anxious, then I asked patient that how she is doing and what is the plan for discharge and she says that most likely she will going to assisted living facility, we reviewed the orthopedic report and the fracture fragments are displaced, nonsurgical option has been referred and maintained, this fracture is going to leave her deformity and impaired range of motion.  Then we reviewed the patient's blood sugars, patient sustainability mobility and ADLs were reviewed.  Patient's laboratories were reviewed, she remains calm and comfortable, old stroke related deficit remains, no falls, no nosocomial infection, no new events or concerns as much as I have been informed on my phone calls and from the nursing staff.         Review of Systems   Constitutional:  Negative for activity change and fatigue.   Respiratory:  Negative for cough, choking and shortness of breath.    Cardiovascular:  Negative for leg swelling.   Gastrointestinal:  Negative for abdominal pain, constipation, diarrhea and vomiting.   Musculoskeletal:  Positive for arthralgias and gait problem. Negative for back pain.   Neurological:  Positive for weakness. Negative for dizziness.   Psychiatric/Behavioral: Negative.         Objective   /79   Pulse 79     Physical Exam  Vitals reviewed.   Constitutional:       Appearance: Normal appearance. She is normal weight.   HENT:      Head: Normocephalic.   Eyes:      Conjunctiva/sclera: Conjunctivae normal.   Cardiovascular:      Rate and Rhythm: Normal rate and regular rhythm.   Pulmonary:      Effort: Pulmonary effort is normal.      Breath sounds: Normal breath sounds.   Abdominal:      Palpations: Abdomen is soft.   Musculoskeletal:         General: Tenderness and deformity present. No  swelling.      Cervical back: Neck supple.   Skin:     General: Skin is warm and dry.   Neurological:      Motor: Weakness present.   Psychiatric:         Mood and Affect: Mood normal.         Assessment/Plan   Problem List Items Addressed This Visit             ICD-10-CM    Stage 3 chronic kidney disease, unspecified whether stage 3a or 3b CKD (CMS/[facility]) N18.30    Pacemaker Z95.0    DM type 2 with diabetic mixed hyperlipidemia (CMS/[facility]) E11.69, E78.2    Old cerebrovascular accident (CVA) without late effect Z86.73    Closed fracture of proximal end of left humerus with malunion, unspecified fracture morphology, subsequent encounter - Primary S42.202P     Other Visit Diagnoses         Codes    Impaired functional mobility, balance, and endurance     Z74.09        Nursing staff are asking me that family wants to know the prognosis so as far as fracture is concerned it is going to relieve her pain, deformity and limited range of motion, as far as residual mobility is concerned she will not be able to use left upper extremity much, transition to assisted living facility would be appropriate, previously patient was homebound, we will do remember she sustained a stroke also in the early part of the year, she is elderly, she has impaired functional mobility and patient has a fluctuating renal functions and not absolutely normal ambulation.  Previously patient has a pacemaker that is not an issue.  Most likely will require assisted living sort of care, till then skilled nursing related care and follow-up will be continued, laboratories will be monitored and continued, pain and discomfort is unchanged.     Goals    None

## 2023-12-22 ENCOUNTER — NURSING HOME VISIT (OUTPATIENT)
Dept: POST ACUTE CARE | Facility: EXTERNAL LOCATION | Age: 88
End: 2023-12-22
Payer: MEDICARE

## 2023-12-22 VITALS
HEART RATE: 77 BPM | SYSTOLIC BLOOD PRESSURE: 128 MMHG | DIASTOLIC BLOOD PRESSURE: 54 MMHG | HEIGHT: 63 IN | RESPIRATION RATE: 18 BRPM | BODY MASS INDEX: 25.34 KG/M2 | TEMPERATURE: 97.3 F | OXYGEN SATURATION: 96 % | WEIGHT: 143 LBS

## 2023-12-22 DIAGNOSIS — E78.2 DM TYPE 2 WITH DIABETIC MIXED HYPERLIPIDEMIA (MULTI): ICD-10-CM

## 2023-12-22 DIAGNOSIS — S42.422D: Primary | ICD-10-CM

## 2023-12-22 DIAGNOSIS — R52 PAIN: ICD-10-CM

## 2023-12-22 DIAGNOSIS — E11.69 DM TYPE 2 WITH DIABETIC MIXED HYPERLIPIDEMIA (MULTI): ICD-10-CM

## 2023-12-22 PROCEDURE — 99308 SBSQ NF CARE LOW MDM 20: CPT | Performed by: PHYSICIAN ASSISTANT

## 2023-12-22 NOTE — LETTER
"Patient: Toshia Méndez  : 1929    Encounter Date: 2023  Name: Toshia Méndez  YOB: 1929    Chief complaint: Follow up for L humerus fracture.    HPI: Patient has recent comminuted and displaced proximal left humerus fracture. She was not considered a surgical candidate and has been treated conservatively with sling to immobilize L shoulder. Patient reports she is comfortable on Tramadol alternating with Tylenol. Physical therapy notes indicate she is able to walk 50-60 feet with quad cane. Non-weight bearing status to L arm limiting her mobility and requires assistance with ADL's.     Arm Injury   The incident occurred more than 1 week ago. The incident occurred at home. The injury mechanism was a fall. The pain is present in the left shoulder. The quality of the pain is described as aching. The pain does not radiate. The pain is at a severity of 4/10. The pain is moderate. The pain has been Fluctuating since the incident. Pertinent negatives include no chest pain, numbness or tingling. Nothing aggravates the symptoms. She has tried acetaminophen, immobilization and rest for the symptoms. The treatment provided moderate relief.     Review of systems:   ROS negative except were noted in HPI.    Code Status: full code    /54   Pulse 77   Temp 36.3 °C (97.3 °F)   Resp 18   Ht 1.6 m (5' 3\")   Wt 64.9 kg (143 lb)   SpO2 96%   BMI 25.33 kg/m²         Physical Exam  Constitutional:       General: She is not in acute distress.  HENT:      Head: Normocephalic.      Nose: Nose normal.      Mouth/Throat:      Mouth: Mucous membranes are moist.   Eyes:      Extraocular Movements: Extraocular movements intact.      Pupils: Pupils are equal, round, and reactive to light.   Cardiovascular:      Rate and Rhythm: Normal rate and regular rhythm.      Pulses: Normal pulses.   Pulmonary:      Effort: Pulmonary effort is normal.      Breath sounds: Normal breath sounds. "   Abdominal:      General: Bowel sounds are normal.      Palpations: Abdomen is soft.      Tenderness: There is no abdominal tenderness. There is no guarding.   Genitourinary:     Comments: Voiding  Musculoskeletal:         General: Normal range of motion.      Cervical back: Normal range of motion.      Comments: L arm sling maintained. Brisk capillary refill in all fingers and thumb.   Skin:     General: Skin is warm and dry.      Capillary Refill: Capillary refill takes less than 2 seconds.   Neurological:      General: No focal deficit present.      Mental Status: She is alert and oriented to person, place, and time.   Psychiatric:         Mood and Affect: Mood normal.         Behavior: Behavior normal.     Medications reviewed during visit at facility.  Aspirin 81 mg po daily  Omeprazole 10 mg po daily  Vitamin C 500 mg po daily  Amlodipine 10 mg po daily  Alprazolam 0.25 1/2 tablet po daily  Lasix 20 mg Mon, , and Friday  Atorvastatin 80 mg po daily  Dapagliflozin 10 mg po daily  Losartan 100 mg po daily  Tylenol 650 mg po q 4 hours prn  Tramadol 50 mg po q 8 prn.  Glimepiride 4 mg po bid  Metoprolol tartrate 75 mg po bid  Lovasa 1 gram two capsules po daily  Levothyroxine 100 mcg po daily  Miralax 17 grams po daily  Biotin 5 mg po daily  Folic acid 1 mg po naina  Labs reviewed at facility:     Laboratory Service Report [id number]   Patient Name   ESA MCNALLY  Patient ID   [id number]  Age   89 Y  Gender   F  Order #      Ordering Phys   DILIP DONNELLY  Patient Telephone #   N/A     1929  AKA      Client Order #   [id number]   Collection Date and Time   2023 05:55   Print Date and Time   2023 17:07  Account Information   [facility] [place]   [address] [place] [address]     Report Notes                  Test Results   Reference Perform  Unit  Value Site*             CBC and Differential  REPORTED 2023 09:38  White Blood Cell Count   5.29 k/uL  3.70-11.00    RBC   4.39 m/uL 3.90-5.20    Hemoglobin   12.9 g/dL 11.5-15.5    Hematocrit   39.5 % 36.0-46.0    MCV   90.0 fL 80.0-100.0    MCH   29.4 pg 26.0-34.0    MCHC   32.7 g/dL 30.5-36.0    RDW-CV H 16.9 % 11.5-15.0    Platelet Count   214 k/uL 150-400    MPV   10.7 fL 9.0-12.7    Neut%   53.5 %    Abs Neut   2.83 k/uL 1.45-7.50    Lymph%   27.6 %    Abs Lymph   1.46 k/uL 1.00-4.00    Mono%   10.4 %    Abs Mono   0.55 k/uL <0.87    Eosin%   7.0 %    Abs Eosin   0.37 k/uL <0.46    Baso%   1.3 %    Abs Baso   0.07 k/uL <0.11    Immature Gran %%   0.2 %    Abs Immature Gran   <0.03 k/uL <0.10    NRBC   0.0 /100 WBC    Absolute nRBC   <0.01 k/uL <0.01    Diff Type   Auto               Comp Metabolic Panel  REPORTED 12/22/2023 09:55  Protein, Total   6.7 g/dL 6.3-8.0    Albumin L 3.4 g/dL 3.9-4.9    Calcium, Total   9.3 mg/dL 8.5-10.2    Bilirubin, Total   0.5 mg/dL 0.2-1.3    Alkaline Phosphatase H 318 U/L     AST H 93 U/L 13-35    ALT H 99 U/L 7-38    Glucose H 164 mg/dL 74-99  BUN   12 mg/dL 7-21    Creatinine L 0.57 mg/dL 0.58-0.96    Sodium   140 mmol/L 136-144    Potassium   3.9 mmol/L 3.7-5.1    Chloride   104 mmol/L     CO2   25 mmol/L 22-30    Anion Gap   11 mmol/L 9-18    Estimated Glomerular Filtration Rate   84 mL/min/1.73 meters squared >=60    Assessment/Plan   Problem List Items Addressed This Visit       DM type 2 with diabetic mixed hyperlipidemia (CMS/HCC)     Review blood sugars. Blood sugar today 199. Continue with Glimepiride 4 mg po bid.. Lovasa a gram take two capsules po daily.         Closed displaced comminuted supracondylar fracture of left humerus without intercondylar fracture with routine healing - Primary     Maintain L arm sling. Schedule follow up with Dr. Kris Andrade (ortho) on 12/28/23. NWB status.          Pain     Tramadol 50 mg po q 8 prn alternate with Tylenol.            Time:    Dudley Acosta PA-C       Electronically Signed By: Dudley Acosta,  GRISEL   12/24/23 11:37 AM

## 2023-12-22 NOTE — PROGRESS NOTES
"12/22/2023  Name: Toshia Méndez  YOB: 1929    Chief complaint: Follow up for L humerus fracture.    HPI: Patient has recent comminuted and displaced proximal left humerus fracture. She was not considered a surgical candidate and has been treated conservatively with sling to immobilize L shoulder. Patient reports she is comfortable on Tramadol alternating with Tylenol. Physical therapy notes indicate she is able to walk 50-60 feet with quad cane. Non-weight bearing status to L arm limiting her mobility and requires assistance with ADL's.     Arm Injury   The incident occurred more than 1 week ago. The incident occurred at home. The injury mechanism was a fall. The pain is present in the left shoulder. The quality of the pain is described as aching. The pain does not radiate. The pain is at a severity of 4/10. The pain is moderate. The pain has been Fluctuating since the incident. Pertinent negatives include no chest pain, numbness or tingling. Nothing aggravates the symptoms. She has tried acetaminophen, immobilization and rest for the symptoms. The treatment provided moderate relief.     Review of systems:   ROS negative except were noted in HPI.    Code Status: full code    /54   Pulse 77   Temp 36.3 °C (97.3 °F)   Resp 18   Ht 1.6 m (5' 3\")   Wt 64.9 kg (143 lb)   SpO2 96%   BMI 25.33 kg/m²         Physical Exam  Constitutional:       General: She is not in acute distress.  HENT:      Head: Normocephalic.      Nose: Nose normal.      Mouth/Throat:      Mouth: Mucous membranes are moist.   Eyes:      Extraocular Movements: Extraocular movements intact.      Pupils: Pupils are equal, round, and reactive to light.   Cardiovascular:      Rate and Rhythm: Normal rate and regular rhythm.      Pulses: Normal pulses.   Pulmonary:      Effort: Pulmonary effort is normal.      Breath sounds: Normal breath sounds.   Abdominal:      General: Bowel sounds are normal.      Palpations: Abdomen is " soft.      Tenderness: There is no abdominal tenderness. There is no guarding.   Genitourinary:     Comments: Voiding  Musculoskeletal:         General: Normal range of motion.      Cervical back: Normal range of motion.      Comments: L arm sling maintained. Brisk capillary refill in all fingers and thumb.   Skin:     General: Skin is warm and dry.      Capillary Refill: Capillary refill takes less than 2 seconds.   Neurological:      General: No focal deficit present.      Mental Status: She is alert and oriented to person, place, and time.   Psychiatric:         Mood and Affect: Mood normal.         Behavior: Behavior normal.     Medications reviewed during visit at facility.  Aspirin 81 mg po daily  Omeprazole 10 mg po daily  Vitamin C 500 mg po daily  Amlodipine 10 mg po daily  Alprazolam 0.25 1/2 tablet po daily  Lasix 20 mg Mon, , and Friday  Atorvastatin 80 mg po daily  Dapagliflozin 10 mg po daily  Losartan 100 mg po daily  Tylenol 650 mg po q 4 hours prn  Tramadol 50 mg po q 8 prn.  Glimepiride 4 mg po bid  Metoprolol tartrate 75 mg po bid  Lovasa 1 gram two capsules po daily  Levothyroxine 100 mcg po daily  Miralax 17 grams po daily  Biotin 5 mg po daily  Folic acid 1 mg po naina  Labs reviewed at facility:     Laboratory Service Report [id number]   Patient Name   ESA MCNALLY  Patient ID   [id number]  Age   89 Y  Gender   F  Order #      Ordering DILIP Leonard  Patient Telephone #   N/A     1929  AKA      Client Order #   [id number]   Collection Date and Time   2023 05:55   Print Date and Time   2023 17:07  Account Information   [facility] NR   [address] [address] [place] [address]     Report Notes                  Test Results   Reference Perform  Unit  Value Site*             CBC and Differential  REPORTED 2023 09:38  White Blood Cell Count   5.29 k/uL 3.70-11.00    RBC   4.39 m/uL 3.90-5.20    Hemoglobin   12.9 g/dL 11.5-15.5     Hematocrit   39.5 % 36.0-46.0    MCV   90.0 fL 80.0-100.0    MCH   29.4 pg 26.0-34.0    MCHC   32.7 g/dL 30.5-36.0    RDW-CV H 16.9 % 11.5-15.0    Platelet Count   214 k/uL 150-400    MPV   10.7 fL 9.0-12.7    Neut%   53.5 %    Abs Neut   2.83 k/uL 1.45-7.50    Lymph%   27.6 %    Abs Lymph   1.46 k/uL 1.00-4.00    Mono%   10.4 %    Abs Mono   0.55 k/uL <0.87    Eosin%   7.0 %    Abs Eosin   0.37 k/uL <0.46    Baso%   1.3 %    Abs Baso   0.07 k/uL <0.11    Immature Gran %%   0.2 %    Abs Immature Gran   <0.03 k/uL <0.10    NRBC   0.0 /100 WBC    Absolute nRBC   <0.01 k/uL <0.01    Diff Type   Auto               Comp Metabolic Panel  REPORTED 12/22/2023 09:55  Protein, Total   6.7 g/dL 6.3-8.0    Albumin L 3.4 g/dL 3.9-4.9    Calcium, Total   9.3 mg/dL 8.5-10.2    Bilirubin, Total   0.5 mg/dL 0.2-1.3    Alkaline Phosphatase H 318 U/L     AST H 93 U/L 13-35    ALT H 99 U/L 7-38    Glucose H 164 mg/dL 74-99  BUN   12 mg/dL 7-21    Creatinine L 0.57 mg/dL 0.58-0.96    Sodium   140 mmol/L 136-144    Potassium   3.9 mmol/L 3.7-5.1    Chloride   104 mmol/L     CO2   25 mmol/L 22-30    Anion Gap   11 mmol/L 9-18    Estimated Glomerular Filtration Rate   84 mL/min/1.73 meters squared >=60    Assessment/Plan    Problem List Items Addressed This Visit       DM type 2 with diabetic mixed hyperlipidemia (CMS/[facility])     Review blood sugars. Blood sugar today 199. Continue with Glimepiride 4 mg po bid.. Lovasa a gram take two capsules po daily.         Closed displaced comminuted supracondylar fracture of left humerus without intercondylar fracture with routine healing - Primary     Maintain L arm sling. Schedule follow up with Dr. Kris Andrade (ortho) on 12/28/23. NWB status.          Pain     Tramadol 50 mg po q 8 prn alternate with Tylenol.            Time:    Dudley Acosta PA-C

## 2023-12-24 PROBLEM — R52 PAIN: Status: ACTIVE | Noted: 2023-12-24

## 2023-12-24 ASSESSMENT — ENCOUNTER SYMPTOMS
TINGLING: 0
NUMBNESS: 0

## 2023-12-24 NOTE — ASSESSMENT & PLAN NOTE
Review blood sugars. Blood sugar today 199. Continue with Glimepiride 4 mg po bid.. Lovasa a gram take two capsules po daily.

## 2023-12-24 NOTE — ASSESSMENT & PLAN NOTE
Maintain L arm sling. Schedule follow up with Dr. Kris Andrade (ortho) on 12/28/23. NWB status.

## 2023-12-27 ENCOUNTER — NURSING HOME VISIT (OUTPATIENT)
Dept: POST ACUTE CARE | Facility: EXTERNAL LOCATION | Age: 88
End: 2023-12-27
Payer: MEDICARE

## 2023-12-27 DIAGNOSIS — S42.422D: Primary | ICD-10-CM

## 2023-12-27 DIAGNOSIS — Z86.73 OLD CEREBROVASCULAR ACCIDENT (CVA) WITHOUT LATE EFFECT: ICD-10-CM

## 2023-12-27 DIAGNOSIS — Z95.0 PACEMAKER: ICD-10-CM

## 2023-12-27 DIAGNOSIS — N18.30 STAGE 3 CHRONIC KIDNEY DISEASE, UNSPECIFIED WHETHER STAGE 3A OR 3B CKD (MULTI): ICD-10-CM

## 2023-12-27 DIAGNOSIS — E11.40 TYPE 2 DIABETES MELLITUS WITH DIABETIC NEUROPATHY, WITHOUT LONG-TERM CURRENT USE OF INSULIN (MULTI): ICD-10-CM

## 2023-12-27 DIAGNOSIS — F41.8 DEPRESSION WITH ANXIETY: ICD-10-CM

## 2023-12-27 DIAGNOSIS — R94.5 ABNORMAL LIVER FUNCTION: ICD-10-CM

## 2023-12-27 PROCEDURE — 99309 SBSQ NF CARE MODERATE MDM 30: CPT | Performed by: INTERNAL MEDICINE

## 2023-12-27 NOTE — LETTER
Patient: Toshia Méndez  : 1929    Encounter Date: 2023    Subjective  Patient ID: Toshia Méndez is a 89 y.o. female who is acute skilled care being seen and evaluated for multiple medical problems.    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  Patient has a cholecystectomy, liver span is 16 cm, sling from the shoulder has been taken off, I ask about the disposition of this patient and they said that patient could go to assisted living facility, patient sustained a stroke in month of May, patient remains on trifluoperazine and chlorpromazine derivatives and these medications cannot be stopped by myself because it could be chronic treatment, yesterday family came and there is thought that patient is confused and that made us to do some lab testing investigations and besides LFT abnormality we did not find anything.         Review of Systems   Constitutional: Negative.  Negative for activity change and appetite change.   Respiratory:  Negative for apnea, cough and shortness of breath.    Cardiovascular: Negative.  Negative for leg swelling.   Gastrointestinal:  Negative for abdominal distention, diarrhea, nausea and vomiting.   Musculoskeletal:  Positive for arthralgias.   Neurological: Negative.  Negative for weakness.   Psychiatric/Behavioral:  Negative for confusion.        Objective  BP  132/78   Pulse 69     Physical Exam  Vitals reviewed.   Constitutional:       Appearance: Normal appearance. She is normal weight.   HENT:      Head: Normocephalic.   Eyes:      Conjunctiva/sclera: Conjunctivae normal.   Cardiovascular:      Rate and Rhythm: Normal rate and regular rhythm.   Pulmonary:      Effort: Pulmonary effort is normal.      Breath sounds: Normal breath sounds.   Abdominal:      Palpations: Abdomen is soft.   Musculoskeletal:         General: Tenderness and deformity present. No swelling.      Cervical back: Neck supple.   Skin:     General: Skin is warm and dry.   Neurological:      Motor: Weakness present.   Psychiatric:         Mood and Affect: Mood normal.   Assessment/Plan  Problem List Items Addressed This Visit             ICD-10-CM    Stage 3 chronic kidney disease, unspecified whether stage 3a or 3b CKD (CMS/HCC) N18.30    Depression with anxiety F41.8    Diabetes mellitus, type II (CMS/HCC) E11.9    Pacemaker Z95.0    Old cerebrovascular accident (CVA) without late effect Z86.73    Closed displaced comminuted supracondylar fracture of left humerus without intercondylar fracture with routine healing - Primary S42.422D     Other Visit Diagnoses         Codes    Abnormal liver function     R94.5        We will do CT scan of abdomen and pelvis, we will do antimitochondrial antibody, GGT, 5 nucleotidase level, we will hold atorvastatin, chlorpromazine is another medication which we could hold but we will see, we will look for any intrahepatic or extrahepatic biliary obstruction.  I cannot guarantee that I can find definitive etiology for LFT abnormality I told nursing staff to communicate with the family, today she is not confused, she is able to answer my questions, chronic kidney disease persist, patient has a pacemaker, blood sugars are in 200s, we will add Tradjenta, she is already on glimepiride, old CVA related deficit on left-sided limbs persist, there is no obvious icterus or  jaundice.  Fracture of humerus is healing but it could leave some deformity and pain and limited mobility.     Goals    None           Electronically Signed By: Jamal Villagran MD   12/28/23  9:58 PM

## 2023-12-28 VITALS — HEART RATE: 69 BPM | SYSTOLIC BLOOD PRESSURE: 132 MMHG | DIASTOLIC BLOOD PRESSURE: 78 MMHG

## 2023-12-28 ASSESSMENT — ENCOUNTER SYMPTOMS
NEUROLOGICAL NEGATIVE: 1
CONSTITUTIONAL NEGATIVE: 1
APPETITE CHANGE: 0
ARTHRALGIAS: 1
NAUSEA: 0
CARDIOVASCULAR NEGATIVE: 1
WEAKNESS: 0
CONFUSION: 0
DIARRHEA: 0
VOMITING: 0
APNEA: 0
SHORTNESS OF BREATH: 0
ABDOMINAL DISTENTION: 0
ACTIVITY CHANGE: 0
COUGH: 0

## 2023-12-29 NOTE — PROGRESS NOTES
Subjective   Patient ID: Toshia Méndez is a 89 y.o. female who is acute skilled care being seen and evaluated for multiple medical problems.    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  Patient has a cholecystectomy, liver span is 16 cm, sling from the shoulder has been taken off, I ask about the disposition of this patient and they said that patient could go to assisted living facility, patient sustained a stroke in month of May, patient remains on trifluoperazine and chlorpromazine derivatives and these medications cannot be stopped by myself because it could be chronic treatment, yesterday family came and there is thought that patient is confused and that made us to do some lab testing investigations and besides LFT abnormality we did not find anything.         Review of Systems   Constitutional: Negative.  Negative for activity change and appetite change.   Respiratory:  Negative for apnea, cough and shortness of breath.    Cardiovascular: Negative.  Negative for leg swelling.   Gastrointestinal:  Negative for abdominal distention, diarrhea, nausea and vomiting.   Musculoskeletal:  Positive for arthralgias.   Neurological: Negative.  Negative for weakness.   Psychiatric/Behavioral:  Negative for confusion.        Objective   /78   Pulse 69     Physical Exam  Vitals reviewed.   Constitutional:        Appearance: Normal appearance. She is normal weight.   HENT:      Head: Normocephalic.   Eyes:      Conjunctiva/sclera: Conjunctivae normal.   Cardiovascular:      Rate and Rhythm: Normal rate and regular rhythm.   Pulmonary:      Effort: Pulmonary effort is normal.      Breath sounds: Normal breath sounds.   Abdominal:      Palpations: Abdomen is soft.   Musculoskeletal:         General: Tenderness and deformity present. No swelling.      Cervical back: Neck supple.   Skin:     General: Skin is warm and dry.   Neurological:      Motor: Weakness present.   Psychiatric:         Mood and Affect: Mood normal.   Assessment/Plan   Problem List Items Addressed This Visit             ICD-10-CM    Stage 3 chronic kidney disease, unspecified whether stage 3a or 3b CKD (CMS/HCC) N18.30    Depression with anxiety F41.8    Diabetes mellitus, type II (CMS/[facility]) E11.9    Pacemaker Z95.0    Old cerebrovascular accident (CVA) without late effect Z86.73    Closed displaced comminuted supracondylar fracture of left humerus without intercondylar fracture with routine healing - Primary S42.422D     Other Visit Diagnoses         Codes    Abnormal liver function     R94.5        We will do CT scan of abdomen and pelvis, we will do antimitochondrial antibody, GGT, 5 nucleotidase level, we will hold atorvastatin, chlorpromazine is another medication which we could hold but we will see, we will look for any intrahepatic or extrahepatic biliary obstruction.  I cannot guarantee that I can find definitive etiology for LFT abnormality I told nursing staff to communicate with the family, today she is not confused, she is able to answer my questions, chronic kidney disease persist, patient has a pacemaker, blood sugars are in 200s, we will add Tradjenta, she is already on glimepiride, old CVA related deficit on left-sided limbs persist, there is no obvious icterus or jaundice.  Fracture of humerus is healing but it could leave some deformity and  pain and limited mobility.     Goals    None

## 2024-01-03 ENCOUNTER — NURSING HOME VISIT (OUTPATIENT)
Dept: POST ACUTE CARE | Facility: EXTERNAL LOCATION | Age: 89
End: 2024-01-03
Payer: MEDICARE

## 2024-01-03 DIAGNOSIS — Z86.73 OLD CEREBROVASCULAR ACCIDENT (CVA) WITHOUT LATE EFFECT: ICD-10-CM

## 2024-01-03 DIAGNOSIS — R94.5 ABNORMAL LIVER FUNCTION: ICD-10-CM

## 2024-01-03 DIAGNOSIS — E11.69 DM TYPE 2 WITH DIABETIC MIXED HYPERLIPIDEMIA (MULTI): ICD-10-CM

## 2024-01-03 DIAGNOSIS — E78.2 DM TYPE 2 WITH DIABETIC MIXED HYPERLIPIDEMIA (MULTI): ICD-10-CM

## 2024-01-03 DIAGNOSIS — S42.422D: Primary | ICD-10-CM

## 2024-01-03 DIAGNOSIS — Z95.0 PACEMAKER: ICD-10-CM

## 2024-01-03 DIAGNOSIS — N18.30 STAGE 3 CHRONIC KIDNEY DISEASE, UNSPECIFIED WHETHER STAGE 3A OR 3B CKD (MULTI): ICD-10-CM

## 2024-01-03 DIAGNOSIS — F41.8 DEPRESSION WITH ANXIETY: ICD-10-CM

## 2024-01-03 PROCEDURE — 99309 SBSQ NF CARE MODERATE MDM 30: CPT | Performed by: INTERNAL MEDICINE

## 2024-01-03 NOTE — LETTER
Patient: Toshia Méndez  : 1929    Encounter Date: 2024    Subjective  Patient ID: Toshia Méndez is a 89 y.o. female who is acute skilled care being seen and evaluated for multiple medical problems.    This patient's LFTs remains abnormal now plan the stop alkaline phosphatase is going up and the transaminases has been coming, ultrasound showed some nonspecific altered echogenicity.  Chlorpromazine derivatives can cause cholestasis, discussed with patient's daughter at length after patient was seen by me and she says that she never had a problem with LFTs.  Tradjenta was started because her blood sugars were abnormal but Tradjenta does not cause abnormal LFTs.  Statin therapy was taken off, she does not have any symptoms of nausea vomiting, antimitochondrial antibody is negative.  Rest of the therapy does not cause abnormal LFTs as they were carefully reviewed besides medications for her mental health problems.  Patient's daughter says that with the glimepiride therapy her blood sugars were never high, it is questionable whether patient can go to home or needs to go to assisted living facility because she has a stroke in May and now fracture of the humerus although it is improving with range of motion is not coming back.  Serial LFTs were reviewed.         Review of Systems  Constitutional: Negative.  Negative for activity change and appetite change.   Respiratory:  Negative for apnea, cough and shortness of breath.    Cardiovascular: Negative.  Negative for leg swelling.   Gastrointestinal:  Negative for abdominal distention, diarrhea, nausea and vomiting.   Musculoskeletal:  Positive for arthralgias.   Neurological: Negative.  Negative for weakness.   Psychiatric/Behavioral:  Negative for confusion.    Objective  /80   Pulse 73   Wt 62.1 kg (137 lb)   BMI 24.27 kg/m²     Physical Exam  Vitals reviewed.   Constitutional:       Appearance: Normal appearance. She is normal weight.    HENT:      Head: Normocephalic.   Eyes:      Conjunctiva/sclera: Conjunctivae normal.   Cardiovascular:      Rate and Rhythm: Normal rate and regular rhythm.   Pulmonary:      Effort: Pulmonary effort is normal.      Breath sounds: Normal breath sounds.   Abdominal:      Palpations: Abdomen is soft.   Musculoskeletal:         General: Tenderness and deformity present. No swelling.      Cervical back: Neck supple.   Skin:     General: Skin is warm and dry.   Neurological:      Motor: Weakness present.   Psychiatric:         Mood and Affect: Mood normal.   Assessment/Plan  Problem List Items Addressed This Visit             ICD-10-CM    Stage 3 chronic kidney disease, unspecified whether stage 3a or 3b CKD (CMS/HCC) N18.30    Depression with anxiety F41.8    Pacemaker Z95.0    DM type 2 with diabetic mixed hyperlipidemia (CMS/[facility]) E11.69, E78.2    Old cerebrovascular accident (CVA) without late effect Z86.73    Closed displaced comminuted supracondylar fracture of left humerus without intercondylar fracture with routine healing - Primary S42.422D     Other Visit Diagnoses         Codes    Abnormal liver function     R94.5        LFTs remains abnormal, cholestasis persist, CT scan is awaited, better imaging of pancreas is needed.  Needs a follow-up with psychiatrist for consideration of discontinuation of phenothiazines.  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  Fracture is healing, patient's daughter has several questions and they were answered to her satisfaction via telephonic communication.  Discussed with nursing staff, discussed with patient, patient is quite cognitively  intact.     Goals    None           Electronically Signed By: Jamal Villagran MD   1/4/24  9:38 PM

## 2024-01-04 VITALS
BODY MASS INDEX: 24.27 KG/M2 | DIASTOLIC BLOOD PRESSURE: 80 MMHG | WEIGHT: 137 LBS | HEART RATE: 73 BPM | SYSTOLIC BLOOD PRESSURE: 126 MMHG

## 2024-01-05 NOTE — PROGRESS NOTES
Subjective   Patient ID: Toshia Méndez is a 89 y.o. female who is acute skilled care being seen and evaluated for multiple medical problems.    This patient's LFTs remains abnormal now plan the stop alkaline phosphatase is going up and the transaminases has been coming, ultrasound showed some nonspecific altered echogenicity.  Chlorpromazine derivatives can cause cholestasis, discussed with patient's daughter at length after patient was seen by me and she says that she never had a problem with LFTs.  Tradjenta was started because her blood sugars were abnormal but Tradjenta does not cause abnormal LFTs.  Statin therapy was taken off, she does not have any symptoms of nausea vomiting, antimitochondrial antibody is negative.  Rest of the therapy does not cause abnormal LFTs as they were carefully reviewed besides medications for her mental health problems.  Patient's daughter says that with the glimepiride therapy her blood sugars were never high, it is questionable whether patient can go to home or needs to go to assisted living facility because she has a stroke in May and now fracture of the humerus although it is improving with range of motion is not coming back.  Serial LFTs were reviewed.         Review of Systems  Constitutional: Negative.  Negative for activity change and appetite change.   Respiratory:  Negative for apnea, cough and shortness of breath.    Cardiovascular: Negative.  Negative for leg swelling.   Gastrointestinal:  Negative for abdominal distention, diarrhea, nausea and vomiting.   Musculoskeletal:  Positive for arthralgias.   Neurological: Negative.  Negative for weakness.   Psychiatric/Behavioral:  Negative for confusion.    Objective   /80   Pulse 73   Wt 62.1 kg (137 lb)   BMI 24.27 kg/m²     Physical Exam  Vitals reviewed.   Constitutional:       Appearance: Normal appearance. She is normal weight.   HENT:      Head: Normocephalic.   Eyes:      Conjunctiva/sclera: Conjunctivae  normal.   Cardiovascular:      Rate and Rhythm: Normal rate and regular rhythm.   Pulmonary:      Effort: Pulmonary effort is normal.      Breath sounds: Normal breath sounds.   Abdominal:      Palpations: Abdomen is soft.   Musculoskeletal:         General: Tenderness and deformity present. No swelling.      Cervical back: Neck supple.   Skin:     General: Skin is warm and dry.   Neurological:      Motor: Weakness present.   Psychiatric:         Mood and Affect: Mood normal.   Assessment/Plan   Problem List Items Addressed This Visit             ICD-10-CM    Stage 3 chronic kidney disease, unspecified whether stage 3a or 3b CKD (CMS/[facility]) N18.30    Depression with anxiety F41.8    Pacemaker Z95.0    DM type 2 with diabetic mixed hyperlipidemia (CMS/[facility]) E11.69, E78.2    Old cerebrovascular accident (CVA) without late effect Z86.73    Closed displaced comminuted supracondylar fracture of left humerus without intercondylar fracture with routine healing - Primary S42.422D     Other Visit Diagnoses         Codes    Abnormal liver function     R94.5        LFTs remains abnormal, cholestasis persist, CT scan is awaited, better imaging of pancreas is needed.  Needs a follow-up with psychiatrist for consideration of discontinuation of phenothiazines.  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  Fracture is healing, patient's daughter has several questions and they were answered to her satisfaction via telephonic communication.  Discussed with nursing staff, discussed with patient, patient is quite cognitively intact.     Goals    None

## 2024-01-08 ENCOUNTER — APPOINTMENT (OUTPATIENT)
Dept: RADIOLOGY | Facility: HOSPITAL | Age: 89
End: 2024-01-08
Payer: MEDICARE

## 2024-01-08 ENCOUNTER — HOSPITAL ENCOUNTER (OUTPATIENT)
Facility: HOSPITAL | Age: 89
Setting detail: OBSERVATION
Discharge: NURSING, REHAB OR LONG-TERM CARE | End: 2024-01-12
Attending: EMERGENCY MEDICINE | Admitting: INTERNAL MEDICINE
Payer: MEDICARE

## 2024-01-08 DIAGNOSIS — S42.202P CLOSED FRACTURE OF PROXIMAL END OF LEFT HUMERUS WITH MALUNION, UNSPECIFIED FRACTURE MORPHOLOGY, SUBSEQUENT ENCOUNTER: ICD-10-CM
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
## 2024-01-08 DIAGNOSIS — N39.0 URINARY TRACT INFECTION WITHOUT HEMATURIA, SITE UNSPECIFIED: ICD-10-CM

## 2024-01-08 DIAGNOSIS — S42.422D: ICD-10-CM

## 2024-01-08 DIAGNOSIS — S42.295A OTHER CLOSED NONDISPLACED FRACTURE OF PROXIMAL END OF LEFT HUMERUS, INITIAL ENCOUNTER: Primary | ICD-10-CM

## 2024-01-08 LAB
ALBUMIN SERPL BCP-MCNC: 4.5 G/DL (ref 3.4–5)
ALP SERPL-CCNC: 315 U/L (ref 33–136)
ALT SERPL W P-5'-P-CCNC: 39 U/L (ref 7–45)
ANION GAP SERPL CALC-SCNC: 17 MMOL/L (ref 10–20)
AST SERPL W P-5'-P-CCNC: 38 U/L (ref 9–39)
BASOPHILS # BLD AUTO: 0.05 X10*3/UL (ref 0–0.1)
BASOPHILS NFR BLD AUTO: 0.6 %
BILIRUB SERPL-MCNC: 0.5 MG/DL (ref 0–1.2)
BUN SERPL-MCNC: 19 MG/DL (ref 6–23)
CALCIUM SERPL-MCNC: 10.4 MG/DL (ref 8.6–10.3)
CHLORIDE SERPL-SCNC: 96 MMOL/L (ref 98–107)
CO2 SERPL-SCNC: 24 MMOL/L (ref 21–32)
CREAT SERPL-MCNC: 0.76 MG/DL (ref 0.5–1.05)
EGFRCR SERPLBLD CKD-EPI 2021: 73 ML/MIN/1.73M*2
EOSINOPHIL # BLD AUTO: 0.13 X10*3/UL (ref 0–0.4)
EOSINOPHIL NFR BLD AUTO: 1.5 %
ERYTHROCYTE [DISTWIDTH] IN BLOOD BY AUTOMATED COUNT: 16.4 % (ref 11.5–14.5)
GLUCOSE BLD MANUAL STRIP-MCNC: 204 MG/DL (ref 74–99)
GLUCOSE SERPL-MCNC: 223 MG/DL (ref 74–99)
HCT VFR BLD AUTO: 44.1 % (ref 36–46)
HGB BLD-MCNC: 14.7 G/DL (ref 12–16)
IMM GRANULOCYTES # BLD AUTO: 0.02 X10*3/UL (ref 0–0.5)
IMM GRANULOCYTES NFR BLD AUTO: 0.2 % (ref 0–0.9)
INR PPP: 1.1 (ref 0.9–1.1)
LYMPHOCYTES # BLD AUTO: 2.14 X10*3/UL (ref 0.8–3)
LYMPHOCYTES NFR BLD AUTO: 24.7 %
MCH RBC QN AUTO: 29.3 PG (ref 26–34)
MCHC RBC AUTO-ENTMCNC: 33.3 G/DL (ref 32–36)
MCV RBC AUTO: 88 FL (ref 80–100)
MONOCYTES # BLD AUTO: 0.6 X10*3/UL (ref 0.05–0.8)
MONOCYTES NFR BLD AUTO: 6.9 %
NEUTROPHILS # BLD AUTO: 5.73 X10*3/UL (ref 1.6–5.5)
NEUTROPHILS NFR BLD AUTO: 66.1 %
NRBC BLD-RTO: 0 /100 WBCS (ref 0–0)
PLATELET # BLD AUTO: 353 X10*3/UL (ref 150–450)
POTASSIUM SERPL-SCNC: 4.6 MMOL/L (ref 3.5–5.3)
PROT SERPL-MCNC: 9.4 G/DL (ref 6.4–8.2)
PROTHROMBIN TIME: 12.2 SECONDS (ref 9.8–12.8)
RBC # BLD AUTO: 5.02 X10*6/UL (ref 4–5.2)
SODIUM SERPL-SCNC: 132 MMOL/L (ref 136–145)
WBC # BLD AUTO: 8.7 X10*3/UL (ref 4.4–11.3)

## 2024-01-08 PROCEDURE — 82947 ASSAY GLUCOSE BLOOD QUANT: CPT | Mod: 59

## 2024-01-08 PROCEDURE — 70450 CT HEAD/BRAIN W/O DYE: CPT

## 2024-01-08 PROCEDURE — 99223 1ST HOSP IP/OBS HIGH 75: CPT | Performed by: PHYSICIAN ASSISTANT

## 2024-01-08 PROCEDURE — 99285 EMERGENCY DEPT VISIT HI MDM: CPT | Performed by: EMERGENCY MEDICINE

## 2024-01-08 PROCEDURE — 71045 X-RAY EXAM CHEST 1 VIEW: CPT | Mod: COMPUTED RADIOGRAPHY X-RAY | Performed by: STUDENT IN AN ORGANIZED HEALTH CARE EDUCATION/TRAINING PROGRAM

## 2024-01-08 PROCEDURE — 85025 COMPLETE CBC W/AUTO DIFF WBC: CPT | Performed by: EMERGENCY MEDICINE

## 2024-01-08 PROCEDURE — 73502 X-RAY EXAM HIP UNI 2-3 VIEWS: CPT | Mod: RIGHT SIDE | Performed by: STUDENT IN AN ORGANIZED HEALTH CARE EDUCATION/TRAINING PROGRAM

## 2024-01-08 PROCEDURE — 71045 X-RAY EXAM CHEST 1 VIEW: CPT | Mod: FY

## 2024-01-08 PROCEDURE — 2500000001 HC RX 250 WO HCPCS SELF ADMINISTERED DRUGS (ALT 637 FOR MEDICARE OP): Performed by: PHYSICIAN ASSISTANT

## 2024-01-08 PROCEDURE — 85610 PROTHROMBIN TIME: CPT | Performed by: EMERGENCY MEDICINE

## 2024-01-08 PROCEDURE — 73502 X-RAY EXAM HIP UNI 2-3 VIEWS: CPT | Mod: RT

## 2024-01-08 PROCEDURE — 36415 COLL VENOUS BLD VENIPUNCTURE: CPT | Performed by: EMERGENCY MEDICINE

## 2024-01-08 PROCEDURE — 72125 CT NECK SPINE W/O DYE: CPT | Performed by: RADIOLOGY

## 2024-01-08 PROCEDURE — 2500000004 HC RX 250 GENERAL PHARMACY W/ HCPCS (ALT 636 FOR OP/ED): Performed by: EMERGENCY MEDICINE

## 2024-01-08 PROCEDURE — 73030 X-RAY EXAM OF SHOULDER: CPT | Mod: LT,FY

## 2024-01-08 PROCEDURE — 80053 COMPREHEN METABOLIC PANEL: CPT | Performed by: EMERGENCY MEDICINE

## 2024-01-08 PROCEDURE — 70450 CT HEAD/BRAIN W/O DYE: CPT | Performed by: RADIOLOGY

## 2024-01-08 PROCEDURE — 96374 THER/PROPH/DIAG INJ IV PUSH: CPT | Performed by: EMERGENCY MEDICINE

## 2024-01-08 PROCEDURE — 72125 CT NECK SPINE W/O DYE: CPT

## 2024-01-08 PROCEDURE — G0378 HOSPITAL OBSERVATION PER HR: HCPCS

## 2024-01-08 PROCEDURE — 73030 X-RAY EXAM OF SHOULDER: CPT | Mod: COMPUTED RADIOGRAPHY X-RAY | Performed by: STUDENT IN AN ORGANIZED HEALTH CARE EDUCATION/TRAINING PROGRAM

## 2024-01-08 RX ORDER — LOSARTAN POTASSIUM 100 MG/1
100 TABLET ORAL DAILY
Status: DISCONTINUED | OUTPATIENT
Start: 2024-01-09 | End: 2024-01-12 | Stop reason: HOSPADM

## 2024-01-08 RX ORDER — POLYETHYLENE GLYCOL 3350 17 G/17G
17 POWDER, FOR SOLUTION ORAL DAILY PRN
Status: DISCONTINUED | OUTPATIENT
Start: 2024-01-08 | End: 2024-01-12 | Stop reason: HOSPADM

## 2024-01-08 RX ORDER — ASPIRIN 81 MG/1
81 TABLET ORAL DAILY
Status: DISCONTINUED | OUTPATIENT
Start: 2024-01-09 | End: 2024-01-12 | Stop reason: HOSPADM

## 2024-01-08 RX ORDER — ASCORBIC ACID 500 MG
500 TABLET ORAL DAILY
Status: DISCONTINUED | OUTPATIENT
Start: 2024-01-09 | End: 2024-01-12 | Stop reason: HOSPADM

## 2024-01-08 RX ORDER — LEVOTHYROXINE SODIUM 100 UG/1
100 TABLET ORAL
Status: DISCONTINUED | OUTPATIENT
Start: 2024-01-09 | End: 2024-01-12 | Stop reason: HOSPADM

## 2024-01-08 RX ORDER — LIDOCAINE 560 MG/1
1 PATCH PERCUTANEOUS; TOPICAL; TRANSDERMAL DAILY
Status: DISCONTINUED | OUTPATIENT
Start: 2024-01-09 | End: 2024-01-12 | Stop reason: HOSPADM

## 2024-01-08 RX ORDER — OXYCODONE HYDROCHLORIDE 5 MG/1
2.5 TABLET ORAL EVERY 4 HOURS PRN
Status: DISCONTINUED | OUTPATIENT
Start: 2024-01-08 | End: 2024-01-12 | Stop reason: HOSPADM

## 2024-01-08 RX ORDER — OXYCODONE HYDROCHLORIDE 5 MG/1
7.5 TABLET ORAL EVERY 6 HOURS PRN
Status: DISCONTINUED | OUTPATIENT
Start: 2024-01-08 | End: 2024-01-12 | Stop reason: HOSPADM

## 2024-01-08 RX ORDER — MORPHINE SULFATE 4 MG/ML
4 INJECTION, SOLUTION INTRAMUSCULAR; INTRAVENOUS ONCE
Status: COMPLETED | OUTPATIENT
Start: 2024-01-08 | End: 2024-01-08

## 2024-01-08 RX ORDER — DEXTROSE MONOHYDRATE 100 MG/ML
0.3 INJECTION, SOLUTION INTRAVENOUS ONCE AS NEEDED
Status: DISCONTINUED | OUTPATIENT
Start: 2024-01-08 | End: 2024-01-12 | Stop reason: HOSPADM

## 2024-01-08 RX ORDER — CHLORPROMAZINE HYDROCHLORIDE 10 MG/1
10 TABLET, FILM COATED ORAL NIGHTLY
Status: DISCONTINUED | OUTPATIENT
Start: 2024-01-08 | End: 2024-01-12 | Stop reason: HOSPADM

## 2024-01-08 RX ORDER — GLIMEPIRIDE 2 MG/1
4 TABLET ORAL 2 TIMES DAILY
Status: DISCONTINUED | OUTPATIENT
Start: 2024-01-08 | End: 2024-01-12 | Stop reason: HOSPADM

## 2024-01-08 RX ORDER — FOLIC ACID 1 MG/1
1 TABLET ORAL DAILY
Status: DISCONTINUED | OUTPATIENT
Start: 2024-01-09 | End: 2024-01-12 | Stop reason: HOSPADM

## 2024-01-08 RX ORDER — ONDANSETRON HYDROCHLORIDE 2 MG/ML
4 INJECTION, SOLUTION INTRAVENOUS EVERY 8 HOURS PRN
Status: DISCONTINUED | OUTPATIENT
Start: 2024-01-08 | End: 2024-01-12 | Stop reason: HOSPADM

## 2024-01-08 RX ORDER — TALC
3 POWDER (GRAM) TOPICAL DAILY
Status: DISCONTINUED | OUTPATIENT
Start: 2024-01-08 | End: 2024-01-12 | Stop reason: HOSPADM

## 2024-01-08 RX ORDER — DEXTROSE 50 % IN WATER (D50W) INTRAVENOUS SYRINGE
25
Status: DISCONTINUED | OUTPATIENT
Start: 2024-01-08 | End: 2024-01-12 | Stop reason: HOSPADM

## 2024-01-08 RX ORDER — AMLODIPINE BESYLATE 10 MG/1
10 TABLET ORAL DAILY
Status: DISCONTINUED | OUTPATIENT
Start: 2024-01-09 | End: 2024-01-12 | Stop reason: HOSPADM

## 2024-01-08 RX ORDER — ACETAMINOPHEN 325 MG/1
650 TABLET ORAL EVERY 6 HOURS
Status: DISCONTINUED | OUTPATIENT
Start: 2024-01-08 | End: 2024-01-12 | Stop reason: HOSPADM

## 2024-01-08 RX ORDER — ONDANSETRON 4 MG/1
4 TABLET, ORALLY DISINTEGRATING ORAL EVERY 8 HOURS PRN
Status: DISCONTINUED | OUTPATIENT
Start: 2024-01-08 | End: 2024-01-12 | Stop reason: HOSPADM

## 2024-01-08 RX ORDER — OXYCODONE HYDROCHLORIDE 5 MG/1
5 TABLET ORAL EVERY 4 HOURS PRN
Status: DISCONTINUED | OUTPATIENT
Start: 2024-01-08 | End: 2024-01-12 | Stop reason: HOSPADM

## 2024-01-08 RX ORDER — NALOXONE HYDROCHLORIDE 0.4 MG/ML
0.2 INJECTION, SOLUTION INTRAMUSCULAR; INTRAVENOUS; SUBCUTANEOUS EVERY 5 MIN PRN
Status: DISCONTINUED | OUTPATIENT
Start: 2024-01-08 | End: 2024-01-12 | Stop reason: HOSPADM

## 2024-01-08 RX ORDER — BISACODYL 5 MG
10 TABLET, DELAYED RELEASE (ENTERIC COATED) ORAL DAILY PRN
Status: DISCONTINUED | OUTPATIENT
Start: 2024-01-08 | End: 2024-01-12 | Stop reason: HOSPADM

## 2024-01-08 RX ORDER — ATORVASTATIN CALCIUM 80 MG/1
80 TABLET, FILM COATED ORAL DAILY
Status: DISCONTINUED | OUTPATIENT
Start: 2024-01-09 | End: 2024-01-12 | Stop reason: HOSPADM

## 2024-01-08 RX ORDER — ALPRAZOLAM 0.25 MG/1
0.12 TABLET ORAL 2 TIMES DAILY
Status: DISCONTINUED | OUTPATIENT
Start: 2024-01-08 | End: 2024-01-12 | Stop reason: HOSPADM

## 2024-01-08 RX ORDER — ONDANSETRON HYDROCHLORIDE 2 MG/ML
4 INJECTION, SOLUTION INTRAVENOUS ONCE
Status: COMPLETED | OUTPATIENT
Start: 2024-01-08 | End: 2024-01-08

## 2024-01-08 RX ORDER — MULTIVIT-MIN/IRON FUM/FOLIC AC 7.5 MG-4
1 TABLET ORAL DAILY
Status: DISCONTINUED | OUTPATIENT
Start: 2024-01-09 | End: 2024-01-12 | Stop reason: HOSPADM

## 2024-01-08 RX ORDER — INSULIN LISPRO 100 [IU]/ML
0-10 INJECTION, SOLUTION INTRAVENOUS; SUBCUTANEOUS EVERY 4 HOURS
Status: DISCONTINUED | OUTPATIENT
Start: 2024-01-08 | End: 2024-01-09

## 2024-01-08 RX ORDER — PANTOPRAZOLE SODIUM 20 MG/1
20 TABLET, DELAYED RELEASE ORAL
Status: DISCONTINUED | OUTPATIENT
Start: 2024-01-09 | End: 2024-01-12 | Stop reason: HOSPADM

## 2024-01-08 RX ORDER — TRIFLUOPERAZINE HYDROCHLORIDE 2 MG/1
2 TABLET, FILM COATED ORAL NIGHTLY
Status: DISCONTINUED | OUTPATIENT
Start: 2024-01-08 | End: 2024-01-09

## 2024-01-08 RX ORDER — FUROSEMIDE 20 MG/1
20 TABLET ORAL
Status: DISCONTINUED | OUTPATIENT
Start: 2024-01-08 | End: 2024-01-12 | Stop reason: HOSPADM

## 2024-01-08 RX ADMIN — MORPHINE SULFATE 4 MG: 4 INJECTION, SOLUTION INTRAMUSCULAR; INTRAVENOUS at 19:38

## 2024-01-08 RX ADMIN — ALPRAZOLAM 0.12 MG: 0.25 TABLET ORAL at 23:45

## 2024-01-08 RX ADMIN — MORPHINE SULFATE 4 MG: 4 INJECTION, SOLUTION INTRAMUSCULAR; INTRAVENOUS at 20:23

## 2024-01-08 RX ADMIN — ONDANSETRON 4 MG: 2 INJECTION INTRAMUSCULAR; INTRAVENOUS at 19:37

## 2024-01-08 SDOH — SOCIAL STABILITY: SOCIAL INSECURITY: HAVE YOU HAD THOUGHTS OF HARMING ANYONE ELSE?: NO

## 2024-01-08 SDOH — SOCIAL STABILITY: SOCIAL INSECURITY: DOES ANYONE TRY TO KEEP YOU FROM HAVING/CONTACTING OTHER FRIENDS OR DOING THINGS OUTSIDE YOUR HOME?: NO

## 2024-01-08 SDOH — SOCIAL STABILITY: SOCIAL INSECURITY: DO YOU FEEL ANYONE HAS EXPLOITED OR TAKEN ADVANTAGE OF YOU FINANCIALLY OR OF YOUR PERSONAL PROPERTY?: NO

## 2024-01-08 SDOH — SOCIAL STABILITY: SOCIAL INSECURITY: HAS ANYONE EVER THREATENED TO HURT YOUR FAMILY OR YOUR PETS?: NO

## 2024-01-08 SDOH — SOCIAL STABILITY: SOCIAL INSECURITY: DO YOU FEEL UNSAFE GOING BACK TO THE PLACE WHERE YOU ARE LIVING?: NO

## 2024-01-08 SDOH — SOCIAL STABILITY: SOCIAL INSECURITY: ARE YOU OR HAVE YOU BEEN THREATENED OR ABUSED PHYSICALLY, EMOTIONALLY, OR SEXUALLY BY ANYONE?: NO

## 2024-01-08 SDOH — SOCIAL STABILITY: SOCIAL INSECURITY: ARE THERE ANY APPARENT SIGNS OF INJURIES/BEHAVIORS THAT COULD BE RELATED TO ABUSE/NEGLECT?: NO

## 2024-01-08 ASSESSMENT — COLUMBIA-SUICIDE SEVERITY RATING SCALE - C-SSRS

## 2024-01-08 ASSESSMENT — PAIN - FUNCTIONAL ASSESSMENT
PAIN_FUNCTIONAL_ASSESSMENT: 0-10
PAIN_FUNCTIONAL_ASSESSMENT: 0-10

## 2024-01-08 ASSESSMENT — LIFESTYLE VARIABLES
HOW OFTEN DO YOU HAVE A DRINK CONTAINING ALCOHOL: NEVER
HOW OFTEN DO YOU HAVE 6 OR MORE DRINKS ON ONE OCCASION: NEVER
SKIP TO QUESTIONS 9-10: 1
EVER FELT BAD OR GUILTY ABOUT YOUR DRINKING: NO
SUBSTANCE_ABUSE_PAST_12_MONTHS: NO
HOW MANY STANDARD DRINKS CONTAINING ALCOHOL DO YOU HAVE ON A TYPICAL DAY: PATIENT DOES NOT DRINK
REASON UNABLE TO ASSESS: NO
HAVE YOU EVER FELT YOU SHOULD CUT DOWN ON YOUR DRINKING: NO
AUDIT-C TOTAL SCORE: 0
AUDIT-C TOTAL SCORE: 0
EVER HAD A DRINK FIRST THING IN THE MORNING TO STEADY YOUR NERVES TO GET RID OF A HANGOVER: NO
PRESCIPTION_ABUSE_PAST_12_MONTHS: NO
HAVE PEOPLE ANNOYED YOU BY CRITICIZING YOUR DRINKING: NO

## 2024-01-08 ASSESSMENT — PAIN DESCRIPTION - PAIN TYPE: TYPE: ACUTE PAIN

## 2024-01-08 ASSESSMENT — ACTIVITIES OF DAILY LIVING (ADL)
HEARING - LEFT EAR: FUNCTIONAL
GROOMING: DEPENDENT
TOILETING: DEPENDENT
ADEQUATE_TO_COMPLETE_ADL: NO
WALKS IN HOME: DEPENDENT
JUDGMENT_ADEQUATE_SAFELY_COMPLETE_DAILY_ACTIVITIES: NO
FEEDING YOURSELF: DEPENDENT
HEARING - RIGHT EAR: FUNCTIONAL
DRESSING YOURSELF: DEPENDENT
PATIENT'S MEMORY ADEQUATE TO SAFELY COMPLETE DAILY ACTIVITIES?: NO
BATHING: DEPENDENT

## 2024-01-08 ASSESSMENT — PATIENT HEALTH QUESTIONNAIRE - PHQ9
1. LITTLE INTEREST OR PLEASURE IN DOING THINGS: NOT AT ALL
SUM OF ALL RESPONSES TO PHQ9 QUESTIONS 1 & 2: 0
2. FEELING DOWN, DEPRESSED OR HOPELESS: NOT AT ALL

## 2024-01-08 ASSESSMENT — COGNITIVE AND FUNCTIONAL STATUS - GENERAL
HELP NEEDED FOR BATHING: TOTAL
TURNING FROM BACK TO SIDE WHILE IN FLAT BAD: TOTAL
EATING MEALS: TOTAL
MOVING TO AND FROM BED TO CHAIR: TOTAL
TOILETING: TOTAL
DRESSING REGULAR UPPER BODY CLOTHING: TOTAL
PATIENT BASELINE BEDBOUND: NO
MOVING FROM LYING ON BACK TO SITTING ON SIDE OF FLAT BED WITH BEDRAILS: TOTAL
MOBILITY SCORE: 6
CLIMB 3 TO 5 STEPS WITH RAILING: TOTAL
STANDING UP FROM CHAIR USING ARMS: TOTAL
DAILY ACTIVITIY SCORE: 6
PERSONAL GROOMING: TOTAL
WALKING IN HOSPITAL ROOM: TOTAL
DRESSING REGULAR LOWER BODY CLOTHING: TOTAL

## 2024-01-08 ASSESSMENT — PAIN DESCRIPTION - LOCATION: LOCATION: SHOULDER

## 2024-01-08 ASSESSMENT — PAIN DESCRIPTION - ORIENTATION: ORIENTATION: LEFT

## 2024-01-08 ASSESSMENT — PAIN SCALES - GENERAL
PAINLEVEL_OUTOF10: 7
PAINLEVEL_OUTOF10: 10 - WORST POSSIBLE PAIN
PAINLEVEL_OUTOF10: 10 - WORST POSSIBLE PAIN

## 2024-01-08 NOTE — ED PROVIDER NOTES
HPI   Chief Complaint   Patient presents with    Shoulder Injury     Pt rehabbing left shoulder injury, fell hitting shoulder no loc or thinners    Fall     Xray done by SNF       Patient is a 89-year-old female who presents the emergency department today due to concern for left shoulder pain after fall.  Patient states that she fell earlier in the day accidentally.  She states that she was bending over to  [employer] when she accidentally fell.  She did hit her shoulder.  She also hit her head.  She had complained of elbow pain in the left elbow and shoulder as well as right hip pain.  Per EMT report from EMS, patient had x-rays that were done outpatient which showed potential shoulder dislocation which is why patient was sent to the ER.  She is not any blood thinners.  She did not lose any consciousness.  She denies any current neck pain.                        No data recorded                Patient History   Past Medical History:   Diagnosis Date    Abnormal weight loss     Weight loss    Anxiety disorder, unspecified     Anxiety    Cardiomegaly     LVH (left ventricular hypertrophy)    Difficulty in walking, not elsewhere classified     Difficulty walking    Disorder of arteries and arterioles, unspecified (CMS/[facility])     Carotid artery disease    Essential (primary) hypertension 11/07/2013    Benign essential hypertension    Hyperlipidemia, unspecified 11/07/2013    Hyperlipidemia    Other symptoms and signs involving the musculoskeletal system     Limb weakness    Personal history of other diseases of the circulatory system     Personal history of coronary atherosclerosis    Personal history of other diseases of the digestive system     History of gastroesophageal reflux (GERD)    Personal history of other diseases of the nervous system and sense organs     History of diplopia    Personal history of other diseases of the respiratory system     History of chronic bronchitis    Personal history of other  endocrine, nutritional and metabolic disease     History of diabetes mellitus    Personal history of other mental and behavioral disorders     History of depression    Personal history of other specified conditions     History of diarrhea    Personal history of other venous thrombosis and embolism     History of deep venous thrombosis    Stress incontinence (female) (male)     Stress incontinence     Past Surgical History:   Procedure Laterality Date    CARDIAC SURGERY  11/19/2014    Heart Surgery    CHOLECYSTECTOMY  11/19/2014    Cholecystectomy    COLONOSCOPY  11/19/2014    Colonoscopy (Fiberoptic)    CORONARY ARTERY BYPASS GRAFT  02/11/2014    CABG    DILATION AND CURETTAGE OF UTERUS  02/11/2014    Dilation And Curettage    LUMBAR LAMINECTOMY  03/04/2015    Laminectomy Lumbar    MR HEAD ANGIO WO IV CONTRAST  2/18/2020    MR HEAD ANGIO WO IV CONTRAST 2/18/2020 [facility] CLINICAL LEGACY    MR NECK ANGIO WO IV CONTRAST  2/18/2020    MR NECK ANGIO WO IV CONTRAST 2/18/2020 [facility] CLINICAL LEGACY    OTHER SURGICAL HISTORY  10/26/2021    Bronchoscopy    OTHER SURGICAL HISTORY  10/26/2021    Arterial stent placement    OTHER SURGICAL HISTORY  10/26/2021    Back surgery    OTHER SURGICAL HISTORY  10/26/2021    Cataract surgery    OTHER SURGICAL HISTORY  10/26/2021    Coronary artery bypass graft    OTHER SURGICAL HISTORY  10/26/2021    Pacemaker insertion    OTHER SURGICAL HISTORY  10/26/2021    Esophagogastroduodenoscopy    OTHER SURGICAL HISTORY  02/11/2014    Previous Stent Placement    TONSILLECTOMY  11/19/2014    Tonsillectomy     No family history on file.  Social History     Tobacco Use    Smoking status: Never    Smokeless tobacco: Never   Substance Use Topics    Alcohol use: Not Currently    Drug use: Not Currently       Physical Exam   ED Triage Vitals [01/08/24 1736]   Temp Heart Rate Resp BP   36.6 °C (97.9 °F) 73 18 176/80      SpO2 Temp src Heart Rate Source Patient Position   98 % -- -- Lying      BP Location FiO2  (%)     Right arm --       Physical Exam  Vitals and nursing note reviewed.   Constitutional:       General: She is not in acute distress.     Appearance: She is well-developed.   HENT:      Head: Normocephalic and atraumatic.   Eyes:      Conjunctiva/sclera: Conjunctivae normal.   Cardiovascular:      Rate and Rhythm: Normal rate and regular rhythm.      Heart sounds: No murmur heard.  Pulmonary:      Effort: Pulmonary effort is normal. No respiratory distress.      Breath sounds: Normal breath sounds.   Abdominal:      Palpations: Abdomen is soft.      Tenderness: There is no abdominal tenderness.   Musculoskeletal:         General: Swelling and tenderness (Left shoulder, elbow, right hip) present.      Cervical back: Neck supple.   Skin:     General: Skin is warm and dry.      Capillary Refill: Capillary refill takes less than 2 seconds.   Neurological:      Mental Status: She is alert.   Psychiatric:         Mood and Affect: Mood normal.       ED Course & MDM   ED Course as of 01/08/24 2107 Mon Jan 08, 2024 1944 Patient is a 89-year-old female presents the ER after fall with complaint of left shoulder, right hip pain.  Did also get CT imaging of her head and C-spine as she did hit her head.  We did x-ray imaging to shoulder and hip as she did have complaints of that.  Did give patient morphine for pain. [MJ]   1944 CT head wo IV contrast  IMPRESSION:  No acute intracranial abnormality was identified.      No detected fracture.      Stable pre-existing findings as reported.   [MJ]   2028 XR hip right with pelvis when performed 2 or 3 views  IMPRESSION:  No radiographic evidence of fracture or dislocation.   [MJ]   2028 XR chest 1 view  IMPRESSION:  1.  No evidence of acute cardiopulmonary process.  2. There is redemonstration of displaced comminuted fracture of the  surgical neck of humerus. 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 No shoulder dislocation.   [MJ]    2028 XR shoulder left 2+ views  IMPRESSION:  1.  No evidence of acute cardiopulmonary process.  2. There is redemonstration of displaced comminuted fracture of the  surgical neck of humerus. 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 No shoulder dislocation.   [MJ]   2028 Patient 80 to be dosed with morphine twice given significant pain.  Will reevaluate after second dose of morphine. [MJ]   2042 Patient still having significant pain.  Given multiple doses of IV narcotics for patient is 89 years old with fracture, will admit for pain control.   [MJ]   2107 Patient discussed with Dr. Randolph, the hospitalist on call, who accepted patient for admission, appreciate his help.  [MJ]      ED Course User Index  [MJ] Costa Flowers DO         Diagnoses as of 01/08/24 2107   Other closed nondisplaced fracture of proximal end of left humerus, initial encounter   Fall, initial encounter       Medical Decision Making      Procedure  Procedures     Costa Flowers DO  Resident  01/08/24 2107       Costa Flowers DO  Resident  01/08/24 2108

## 2024-01-09 ENCOUNTER — APPOINTMENT (OUTPATIENT)
Dept: RADIOLOGY | Facility: HOSPITAL | Age: 89
End: 2024-01-09
Payer: MEDICARE

## 2024-01-09 LAB
ALBUMIN SERPL BCP-MCNC: 3.6 G/DL (ref 3.4–5)
ALP SERPL-CCNC: 265 U/L (ref 33–136)
ALT SERPL W P-5'-P-CCNC: 38 U/L (ref 7–45)
ANION GAP SERPL CALC-SCNC: 9 MMOL/L (ref 10–20)
APPEARANCE UR: CLEAR
AST SERPL W P-5'-P-CCNC: 34 U/L (ref 9–39)
BACTERIA #/AREA URNS AUTO: ABNORMAL /HPF
BILIRUB DIRECT SERPL-MCNC: 0.1 MG/DL (ref 0–0.3)
BILIRUB SERPL-MCNC: 0.5 MG/DL (ref 0–1.2)
BILIRUB UR STRIP.AUTO-MCNC: NEGATIVE MG/DL
BUN SERPL-MCNC: 17 MG/DL (ref 6–23)
CALCIUM SERPL-MCNC: 9.2 MG/DL (ref 8.6–10.3)
CHLORIDE SERPL-SCNC: 101 MMOL/L (ref 98–107)
CO2 SERPL-SCNC: 31 MMOL/L (ref 21–32)
COLOR UR: YELLOW
CREAT SERPL-MCNC: 0.65 MG/DL (ref 0.5–1.05)
EGFRCR SERPLBLD CKD-EPI 2021: 82 ML/MIN/1.73M*2
ERYTHROCYTE [DISTWIDTH] IN BLOOD BY AUTOMATED COUNT: 16.9 % (ref 11.5–14.5)
GGT SERPL-CCNC: 422 U/L (ref 5–55)
GLUCOSE BLD MANUAL STRIP-MCNC: 156 MG/DL (ref 74–99)
GLUCOSE BLD MANUAL STRIP-MCNC: 171 MG/DL (ref 74–99)
GLUCOSE BLD MANUAL STRIP-MCNC: 183 MG/DL (ref 74–99)
GLUCOSE BLD MANUAL STRIP-MCNC: 186 MG/DL (ref 74–99)
GLUCOSE BLD MANUAL STRIP-MCNC: 198 MG/DL (ref 74–99)
GLUCOSE SERPL-MCNC: 159 MG/DL (ref 74–99)
GLUCOSE UR STRIP.AUTO-MCNC: ABNORMAL MG/DL
HCT VFR BLD AUTO: 39.7 % (ref 36–46)
HGB BLD-MCNC: 12.7 G/DL (ref 12–16)
HOLD SPECIMEN: NORMAL
HOLD SPECIMEN: NORMAL
KETONES UR STRIP.AUTO-MCNC: NEGATIVE MG/DL
LEUKOCYTE ESTERASE UR QL STRIP.AUTO: ABNORMAL
MAGNESIUM SERPL-MCNC: 2.01 MG/DL (ref 1.6–2.4)
MCH RBC QN AUTO: 29.1 PG (ref 26–34)
MCHC RBC AUTO-ENTMCNC: 32 G/DL (ref 32–36)
MCV RBC AUTO: 91 FL (ref 80–100)
NITRITE UR QL STRIP.AUTO: NEGATIVE
NRBC BLD-RTO: 0 /100 WBCS (ref 0–0)
PH UR STRIP.AUTO: 5 [PH]
PHOSPHATE SERPL-MCNC: 5.1 MG/DL (ref 2.5–4.9)
PLATELET # BLD AUTO: 326 X10*3/UL (ref 150–450)
POTASSIUM SERPL-SCNC: 4.1 MMOL/L (ref 3.5–5.3)
PROT SERPL-MCNC: 6.8 G/DL (ref 6.4–8.2)
PROT UR STRIP.AUTO-MCNC: NEGATIVE MG/DL
RBC # BLD AUTO: 4.36 X10*6/UL (ref 4–5.2)
RBC # UR STRIP.AUTO: NEGATIVE /UL
RBC #/AREA URNS AUTO: ABNORMAL /HPF
SODIUM SERPL-SCNC: 137 MMOL/L (ref 136–145)
SP GR UR STRIP.AUTO: 1.01
UROBILINOGEN UR STRIP.AUTO-MCNC: <2 MG/DL
WBC # BLD AUTO: 8.6 X10*3/UL (ref 4.4–11.3)
WBC #/AREA URNS AUTO: ABNORMAL /HPF

## 2024-01-09 PROCEDURE — G0378 HOSPITAL OBSERVATION PER HR: HCPCS

## 2024-01-09 PROCEDURE — 76705 ECHO EXAM OF ABDOMEN: CPT

## 2024-01-09 PROCEDURE — 2500000001 HC RX 250 WO HCPCS SELF ADMINISTERED DRUGS (ALT 637 FOR MEDICARE OP): Performed by: PHYSICIAN ASSISTANT

## 2024-01-09 PROCEDURE — 84100 ASSAY OF PHOSPHORUS: CPT | Performed by: PHYSICIAN ASSISTANT

## 2024-01-09 PROCEDURE — 87086 URINE CULTURE/COLONY COUNT: CPT | Mod: STJLAB | Performed by: PHYSICIAN ASSISTANT

## 2024-01-09 PROCEDURE — 82947 ASSAY GLUCOSE BLOOD QUANT: CPT | Mod: 59

## 2024-01-09 PROCEDURE — 82977 ASSAY OF GGT: CPT | Performed by: PHYSICIAN ASSISTANT

## 2024-01-09 PROCEDURE — 83735 ASSAY OF MAGNESIUM: CPT | Performed by: PHYSICIAN ASSISTANT

## 2024-01-09 PROCEDURE — 85027 COMPLETE CBC AUTOMATED: CPT | Performed by: PHYSICIAN ASSISTANT

## 2024-01-09 PROCEDURE — 82435 ASSAY OF BLOOD CHLORIDE: CPT | Performed by: PHYSICIAN ASSISTANT

## 2024-01-09 PROCEDURE — 82248 BILIRUBIN DIRECT: CPT | Performed by: PHYSICIAN ASSISTANT

## 2024-01-09 PROCEDURE — 2500000004 HC RX 250 GENERAL PHARMACY W/ HCPCS (ALT 636 FOR OP/ED): Performed by: PHYSICIAN ASSISTANT

## 2024-01-09 PROCEDURE — 36415 COLL VENOUS BLD VENIPUNCTURE: CPT | Performed by: PHYSICIAN ASSISTANT

## 2024-01-09 PROCEDURE — 94640 AIRWAY INHALATION TREATMENT: CPT

## 2024-01-09 PROCEDURE — 2500000002 HC RX 250 W HCPCS SELF ADMINISTERED DRUGS (ALT 637 FOR MEDICARE OP, ALT 636 FOR OP/ED): Performed by: PHYSICIAN ASSISTANT

## 2024-01-09 PROCEDURE — 81001 URINALYSIS AUTO W/SCOPE: CPT | Performed by: PHYSICIAN ASSISTANT

## 2024-01-09 PROCEDURE — 76705 ECHO EXAM OF ABDOMEN: CPT | Performed by: RADIOLOGY

## 2024-01-09 PROCEDURE — 2500000005 HC RX 250 GENERAL PHARMACY W/O HCPCS: Performed by: PHYSICIAN ASSISTANT

## 2024-01-09 RX ORDER — INSULIN LISPRO 100 [IU]/ML
0-10 INJECTION, SOLUTION INTRAVENOUS; SUBCUTANEOUS
Status: DISCONTINUED | OUTPATIENT
Start: 2024-01-09 | End: 2024-01-12 | Stop reason: HOSPADM

## 2024-01-09 RX ORDER — TRAMADOL HYDROCHLORIDE 50 MG/1
50 TABLET ORAL EVERY 8 HOURS PRN
COMMUNITY

## 2024-01-09 RX ORDER — IPRATROPIUM BROMIDE AND ALBUTEROL SULFATE 2.5; .5 MG/3ML; MG/3ML
3 SOLUTION RESPIRATORY (INHALATION) AS NEEDED
Status: DISCONTINUED | OUTPATIENT
Start: 2024-01-09 | End: 2024-01-12 | Stop reason: HOSPADM

## 2024-01-09 RX ORDER — IPRATROPIUM BROMIDE AND ALBUTEROL SULFATE 2.5; .5 MG/3ML; MG/3ML
3 SOLUTION RESPIRATORY (INHALATION)
Status: DISCONTINUED | OUTPATIENT
Start: 2024-01-09 | End: 2024-01-09

## 2024-01-09 RX ORDER — ENOXAPARIN SODIUM 100 MG/ML
40 INJECTION SUBCUTANEOUS EVERY 24 HOURS
Status: DISCONTINUED | OUTPATIENT
Start: 2024-01-09 | End: 2024-01-12 | Stop reason: HOSPADM

## 2024-01-09 RX ADMIN — PANTOPRAZOLE SODIUM 20 MG: 20 TABLET, DELAYED RELEASE ORAL at 06:04

## 2024-01-09 RX ADMIN — METOPROLOL TARTRATE 75 MG: 50 TABLET, FILM COATED ORAL at 17:40

## 2024-01-09 RX ADMIN — CHLORPROMAZINE HYDROCHLORIDE 10 MG: 10 TABLET, FILM COATED ORAL at 21:13

## 2024-01-09 RX ADMIN — INSULIN LISPRO 2 UNITS: 100 INJECTION, SOLUTION INTRAVENOUS; SUBCUTANEOUS at 21:12

## 2024-01-09 RX ADMIN — INSULIN LISPRO 2 UNITS: 100 INJECTION, SOLUTION INTRAVENOUS; SUBCUTANEOUS at 04:34

## 2024-01-09 RX ADMIN — LOSARTAN POTASSIUM 100 MG: 100 TABLET, FILM COATED ORAL at 17:40

## 2024-01-09 RX ADMIN — Medication 3 MG: at 21:23

## 2024-01-09 RX ADMIN — ALPRAZOLAM 0.12 MG: 0.25 TABLET ORAL at 21:13

## 2024-01-09 RX ADMIN — ENOXAPARIN SODIUM 40 MG: 40 INJECTION SUBCUTANEOUS at 15:55

## 2024-01-09 RX ADMIN — LEVOTHYROXINE SODIUM 100 MCG: 0.1 TABLET ORAL at 06:05

## 2024-01-09 RX ADMIN — AMLODIPINE BESYLATE 10 MG: 10 TABLET ORAL at 17:40

## 2024-01-09 RX ADMIN — OXYCODONE HYDROCHLORIDE 2.5 MG: 5 TABLET ORAL at 17:41

## 2024-01-09 RX ADMIN — FUROSEMIDE 20 MG: 20 TABLET ORAL at 01:46

## 2024-01-09 RX ADMIN — INSULIN LISPRO 4 UNITS: 100 INJECTION, SOLUTION INTRAVENOUS; SUBCUTANEOUS at 01:28

## 2024-01-09 RX ADMIN — LIDOCAINE 1 PATCH: 4 PATCH TOPICAL at 12:03

## 2024-01-09 RX ADMIN — METOPROLOL TARTRATE 75 MG: 50 TABLET, FILM COATED ORAL at 01:39

## 2024-01-09 RX ADMIN — METOPROLOL TARTRATE 75 MG: 50 TABLET, FILM COATED ORAL at 21:13

## 2024-01-09 RX ADMIN — IPRATROPIUM BROMIDE AND ALBUTEROL SULFATE 3 ML: 2.5; .5 SOLUTION RESPIRATORY (INHALATION) at 13:37

## 2024-01-09 RX ADMIN — Medication 3 MG: at 01:40

## 2024-01-09 ASSESSMENT — COGNITIVE AND FUNCTIONAL STATUS - GENERAL
MOBILITY SCORE: 6
DAILY ACTIVITIY SCORE: 6
MOVING FROM LYING ON BACK TO SITTING ON SIDE OF FLAT BED WITH BEDRAILS: TOTAL
PERSONAL GROOMING: TOTAL
EATING MEALS: TOTAL
WALKING IN HOSPITAL ROOM: TOTAL
HELP NEEDED FOR BATHING: TOTAL
STANDING UP FROM CHAIR USING ARMS: TOTAL
CLIMB 3 TO 5 STEPS WITH RAILING: TOTAL
TOILETING: TOTAL
MOVING TO AND FROM BED TO CHAIR: TOTAL
TURNING FROM BACK TO SIDE WHILE IN FLAT BAD: TOTAL
DRESSING REGULAR LOWER BODY CLOTHING: TOTAL
DRESSING REGULAR UPPER BODY CLOTHING: TOTAL

## 2024-01-09 ASSESSMENT — PAIN SCALES - GENERAL
PAINLEVEL_OUTOF10: 0 - NO PAIN
PAINLEVEL_OUTOF10: 0 - NO PAIN

## 2024-01-09 ASSESSMENT — ENCOUNTER SYMPTOMS
CHILLS: 0
FEVER: 0

## 2024-01-09 ASSESSMENT — PAIN - FUNCTIONAL ASSESSMENT
PAIN_FUNCTIONAL_ASSESSMENT: 0-10
PAIN_FUNCTIONAL_ASSESSMENT: 0-10

## 2024-01-09 ASSESSMENT — ACTIVITIES OF DAILY LIVING (ADL): LACK_OF_TRANSPORTATION: PATIENT UNABLE TO ANSWER

## 2024-01-09 NOTE — PROGRESS NOTES
Occupational Therapy                 Therapy Communication Note    Patient Name: Toshia Méndez  MRN: [id number]  Today's Date: 1/9/2024     Discipline: Occupational Therapy    Missed Visit Reason: Missed Visit Reason: Patient placed on medical hold    Missed Time: Attempt    Comment: Received orders for OT eval 1/8. Completed chart review, and pt lethargic and not appropriate for therapy at this time. While here, pt was found to have a subacute (+) displaced comminuted fracture of the surgical neck of the humerus.     Per ortho, patient may continue her nonoperative treatment course which would involve continued passive range of motion with no lifting pulling or pushing the left upper extremity greater than 5 pounds.    Will hold OT eval and complete as appropriate.

## 2024-01-09 NOTE — PROGRESS NOTES
Attempted to meet with patient at the bedside. Sleeping soundly and family on the phone at bedside. Will reattempt to talk with them. Patient was discharged 11/28/23 to [facility].   1345 Attempted again to meet with patient at the bedside--sleeping soundly and not waking to verbal stimulation. Called patient's daughter--left VM-await call back.     Faye Skelton RN

## 2024-01-09 NOTE — CARE PLAN
The patient's goals for the shift include keep me comfortable    The clinical goals for the shift include completed      Problem: Skin  Goal: Decreased wound size/increased tissue granulation at next dressing change  1/9/2024 1729 by Jessica Encarnacion RN  Outcome: Progressing  1/9/2024 1727 by Jessica Encarnacion RN  Outcome: Progressing  Goal: Participates in plan/prevention/treatment measures  1/9/2024 1729 by Jessica Encarnacion RN  Outcome: Progressing  1/9/2024 1727 by Jessica Encarnacion RN  Outcome: Progressing  Goal: Prevent/manage excess moisture  1/9/2024 1729 by Jessica Encarnacion RN  Outcome: Progressing  1/9/2024 1727 by Jessica Encarnacion RN  Outcome: Progressing  Goal: Prevent/minimize sheer/friction injuries  1/9/2024 1729 by Jessica Encarnacion RN  Outcome: Progressing  1/9/2024 1727 by Jessica Encarnacion RN  Outcome: Progressing  Goal: Promote/optimize nutrition  1/9/2024 1729 by Jessica Encarnacion, JUAN J  Outcome: Progressing  1/9/2024 1727 by Jessica Encarnacion, JUAN J  Outcome: Progressing  Goal: Promote skin healing  1/9/2024 1729 by Jessica Encarnacion RN  Outcome: Progressing  1/9/2024 1727 by Jessica Encarnacion, RN  Outcome: Progressing

## 2024-01-09 NOTE — PROGRESS NOTES
Received call back from patient's daughter--she confirms plan is for patient to return to [facility]--referral sent.     Faye Skelton RN

## 2024-01-09 NOTE — PROGRESS NOTES
Pharmacy Medication History Review    Toshia Méndez is a 89 y.o. female admitted for Other closed nondisplaced fracture of proximal end of left humerus, initial encounter. Pharmacy reviewed the patient's [id number] medications and allergies for accuracy.    The list below reflects the updated PTA list. Comments regarding how patient may be taking medications differently can be found in the Admit Orders Activity  Prior to Admission Medications   Prescriptions Last Dose Informant Patient Reported? Taking?   ALPRAZolam (Xanax) 0.25 mg tablet  Other Yes Yes   Sig: Take 0.5 tablets (0.125 mg) by mouth 2 times a day.   acetaminophen (Tylenol) 325 mg tablet Not Taking Other No No   Sig: Take 2 tablets (650 mg) by mouth every 4 hours if needed for mild pain (1 - 3).   Patient not taking: Reported on 1/9/2024   amLODIPine (Norvasc) 10 mg tablet  Other Yes Yes   Sig: Take 1 tablet (10 mg) by mouth once daily.   ascorbic acid (Vitamin C) 500 mg tablet  Other Yes Yes   Sig: Take 1 tablet (500 mg) by mouth once daily.   aspirin 81 mg EC tablet  Other Yes Yes   Sig: Take 1 tablet (81 mg) by mouth once daily.   biotin 5 mg capsule  Other Yes Yes   Sig: Take 1 capsule (5 mg) by mouth once daily.   chlorproMAZINE (Thorazine) 10 mg tablet  Other Yes Yes   Sig: Take 1 tablet (10 mg) by mouth once daily at bedtime.   empagliflozin (Jardiance) 10 mg  Other Yes Yes   Sig: Take 1 tablet (10 mg) by mouth once daily.   folic acid (Folvite) 1 mg tablet  Other Yes Yes   Sig: Take 1 tablet (1 mg) by mouth once daily.   furosemide (Lasix) 20 mg tablet  Other Yes Yes   Sig: Take 1 tablet (20 mg) by mouth once a day on Monday, Wednesday, and Friday.   glimepiride (Amaryl) 4 mg tablet  Other Yes Yes   Sig: Take 1 tablet (4 mg) by mouth 2 times a day.   levothyroxine (Synthroid, Levoxyl) 100 mcg tablet  Other Yes Yes   Sig: Take 1 tablet (100 mcg) by mouth once daily in the morning. Take before meals.   losartan (Cozaar) 100 mg tablet   Other Yes Yes   Sig: Take 1 tablet (100 mg) by mouth once daily.   metoprolol tartrate (Lopressor) 75 mg tablet  Other No Yes   Sig: Take 1 tablet (75 mg) by mouth 2 times a day.   multivitamin with minerals tablet  Other Yes Yes   Sig: Take 1 tablet by mouth once daily.   omega-3 acid ethyl esters (Lovaza) 1 gram capsule  Other Yes Yes   Sig: Take 2 capsules (2 g) by mouth once daily.   omeprazole (PriLOSEC) 10 mg DR capsule  Other Yes Yes   Sig: Take 1 capsule (10 mg) by mouth once daily in the morning. Take before meals.   polyethylene glycol (Glycolax, Miralax) 17 gram packet  Other No Yes   Sig: Take 17 g by mouth once daily as needed (constipation).   traMADol (Ultram) 50 mg tablet  Other Yes Yes   Sig: Take 1 tablet (50 mg) by mouth every 8 hours if needed for moderate pain (4 - 6).   trifluoperazine (Stelazine) 2 mg tablet  Other Yes Yes   Sig: Take 1 tablet (2 mg) by mouth once daily at bedtime.      Facility-Administered Medications: None        The list below reflects the updated allergy list. Please review each documented allergy for additional clarification and justification.  Allergies  Reviewed by Brown Velasquez, EMT on 1/8/2024        Severity Reactions Comments    Iodinated Contrast Media High Shortness of breath     Iodine Medium Swelling     Sulfa (sulfonamide Antibiotics) Medium Hives, Rash     Sulfamethoxazole-trimethoprim Medium Hives, Rash             Patient was unable to be assessed for M2B at discharge. Pharmacy has been updated to N/A - patient is from facility.    Sources used to complete the med history include   [facility] SN[facility] Order Summary report generated 1/9/24    Below are additional concerns with the patient's PTA list.  Removed atorvastatin from PTA Admission Orders tab because not in SNF medication list    Rut Andrew, Kimberly   Transitions of Care Pharmacist   Meds [facility]  Please reach out via Secure Chat for questions,  or if no response call [phone] or vocera [facility]

## 2024-01-09 NOTE — CONSULTS
Reason For Consult  Left proximal humerus fracture status post fall    History Of Present Illness  Toshia Méndez is a 89 y.o. female presenting with fall from nursing facility with resultant right hip and left shoulder pain.  Patient has been previously seen in consultation by Dr. Andrade with a displaced left proximal humerus fracture.  Decision at that time was to proceed with nonoperative management involving the left proximal humerus fracture.  Patient noted increased pain status post fall.  Patient fairly drowsy and somewhat confused on my examination but is able to follow commands and is able to answer questions.  Localizes pain to left proximal shoulder girdle.     Past Medical History  She has a past medical history of Abnormal weight loss, Anxiety disorder, unspecified, Cardiomegaly, Difficulty in walking, not elsewhere classified, Disorder of arteries and arterioles, unspecified (CMS/[facility]), Essential (primary) hypertension (11/07/2013), Hyperlipidemia, unspecified (11/07/2013), Other symptoms and signs involving the musculoskeletal system, Personal history of other diseases of the circulatory system, Personal history of other diseases of the digestive system, Personal history of other diseases of the nervous system and sense organs, Personal history of other diseases of the respiratory system, Personal history of other endocrine, nutritional and metabolic disease, Personal history of other mental and behavioral disorders, Personal history of other specified conditions, Personal history of other venous thrombosis and embolism, and Stress incontinence (female) (male).    Surgical History  She has a past surgical history that includes Other surgical history (10/26/2021); Other surgical history (10/26/2021); Other surgical history (10/26/2021); Other surgical history (10/26/2021); Other surgical history (10/26/2021); Other surgical history (10/26/2021); Other surgical history (10/26/2021); Lumbar laminectomy  "(03/04/2015); Cholecystectomy (11/19/2014); Tonsillectomy (11/19/2014); Cardiac surgery (11/19/2014); Colonoscopy (11/19/2014); Coronary artery bypass graft (02/11/2014); Other surgical history (02/11/2014); Dilation and curettage of uterus (02/11/2014); MR angio head wo IV contrast (2/18/2020); and MR angio neck wo IV contrast (2/18/2020).     Social History  She reports that she has never smoked. She has never used smokeless tobacco. She reports that she does not currently use alcohol. She reports that she does not currently use drugs.    Family History  No family history on file.     Allergies  Iodinated contrast media, Iodine, Sulfa (sulfonamide antibiotics), and Sulfamethoxazole-trimethoprim    Review of Systems  Denies any current chest pain or shortness of breath     Physical Exam  Left upper extremity: Patient has a gross deformity appreciated about the proximal shoulder girdle.  Skin is overall intact and no acute ecchymosis or edema is identified.  Gentle manipulation of the distal humerus appears to move the glenohumeral joint to some degree.  Otherwise neurovascular intact distally.  Tender to palpation about the left proximal humerus at this time.     Last Recorded Vitals  Blood pressure 150/76, pulse 78, temperature 36.1 °C (97 °F), temperature source Temporal, resp. rate 17, height 1.6 m (5' 3\"), weight 62.1 kg (136 lb 14.5 oz), SpO2 93 %.    Relevant Results  AP and scapular Y views of the left shoulder were pendantly reviewed.    Findings demonstrate displaced angulated left proximal humerus fracture with greater than 1 shaft with displacement.  Patient's radiographs demonstrate some callus formation likely consistent with some form of early healing.    Scheduled medications  [Held by provider] acetaminophen, 650 mg, oral, q6h  ALPRAZolam, 0.125 mg, oral, BID  amLODIPine, 10 mg, oral, Daily  ascorbic acid, 500 mg, oral, Daily  [Held by provider] aspirin, 81 mg, oral, Daily  [Held by provider] " atorvastatin, 80 mg, oral, Daily  chlorproMAZINE, 10 mg, oral, Nightly  [Held by provider] empagliflozin, 10 mg, oral, Daily  folic acid, 1 mg, oral, Daily  furosemide, 20 mg, oral, Every Mon/Wed/Fri  [Held by provider] glimepiride, 4 mg, oral, BID  insulin lispro, 0-10 Units, subcutaneous, q4h  ipratropium-albuteroL, 3 mL, nebulization, q6h  levothyroxine, 100 mcg, oral, Daily before breakfast  lidocaine, 1 patch, transdermal, Daily  losartan, 100 mg, oral, Daily  melatonin, 3 mg, oral, Daily  metoprolol tartrate, 75 mg, oral, BID  multivitamin with minerals, 1 tablet, oral, Daily  pantoprazole, 20 mg, oral, Daily before breakfast      Continuous medications     PRN medications  PRN medications: bisacodyl, dextrose 10 % in water (D10W), dextrose, glucagon, naloxone, ondansetron ODT **OR** ondansetron, oxyCODONE, oxyCODONE, oxyCODONE, polyethylene glycol  Results for orders placed or performed during the hospital encounter of 01/08/24 (from the past 24 hour(s))   CBC and Auto Differential   Result Value Ref Range    WBC 8.7 4.4 - 11.3 x10*3/uL    nRBC 0.0 0.0 - 0.0 /100 WBCs    RBC 5.02 4.00 - 5.20 x10*6/uL    Hemoglobin 14.7 12.0 - 16.0 g/dL    Hematocrit 44.1 36.0 - 46.0 %    MCV 88 80 - 100 fL    MCH 29.3 26.0 - 34.0 pg    MCHC 33.3 32.0 - 36.0 g/dL    RDW 16.4 (H) 11.5 - 14.5 %    Platelets 353 150 - 450 x10*3/uL    Neutrophils % 66.1 40.0 - 80.0 %    Immature Granulocytes %, Automated 0.2 0.0 - 0.9 %    Lymphocytes % 24.7 13.0 - 44.0 %    Monocytes % 6.9 2.0 - 10.0 %    Eosinophils % 1.5 0.0 - 6.0 %    Basophils % 0.6 0.0 - 2.0 %    Neutrophils Absolute 5.73 (H) 1.60 - 5.50 x10*3/uL    Immature Granulocytes Absolute, Automated 0.02 0.00 - 0.50 x10*3/uL    Lymphocytes Absolute 2.14 0.80 - 3.00 x10*3/uL    Monocytes Absolute 0.60 0.05 - 0.80 x10*3/uL    Eosinophils Absolute 0.13 0.00 - 0.40 x10*3/uL    Basophils Absolute 0.05 0.00 - 0.10 x10*3/uL   Comprehensive metabolic panel   Result Value Ref Range    Glucose  223 (H) 74 - 99 mg/dL    Sodium 132 (L) 136 - 145 mmol/L    Potassium 4.6 3.5 - 5.3 mmol/L    Chloride 96 (L) 98 - 107 mmol/L    Bicarbonate 24 21 - 32 mmol/L    Anion Gap 17 10 - 20 mmol/L    Urea Nitrogen 19 6 - 23 mg/dL    Creatinine 0.76 0.50 - 1.05 mg/dL    eGFR 73 >60 mL/min/1.73m*2    Calcium 10.4 (H) 8.6 - 10.3 mg/dL    Albumin 4.5 3.4 - 5.0 g/dL    Alkaline Phosphatase 315 (H) 33 - 136 U/L    Total Protein 9.4 (H) 6.4 - 8.2 g/dL    AST 38 9 - 39 U/L    Bilirubin, Total 0.5 0.0 - 1.2 mg/dL    ALT 39 7 - 45 U/L   Protime-INR   Result Value Ref Range    Protime 12.2 9.8 - 12.8 seconds    INR 1.1 0.9 - 1.1   POCT GLUCOSE   Result Value Ref Range    POCT Glucose 204 (H) 74 - 99 mg/dL   POCT GLUCOSE   Result Value Ref Range    POCT Glucose 183 (H) 74 - 99 mg/dL   POCT GLUCOSE   Result Value Ref Range    POCT Glucose 171 (H) 74 - 99 mg/dL   Gamma-Glutamyl Transferase   Result Value Ref Range     (H) 5 - 55 U/L   CBC   Result Value Ref Range    WBC 8.6 4.4 - 11.3 x10*3/uL    nRBC 0.0 0.0 - 0.0 /100 WBCs    RBC 4.36 4.00 - 5.20 x10*6/uL    Hemoglobin 12.7 12.0 - 16.0 g/dL    Hematocrit 39.7 36.0 - 46.0 %    MCV 91 80 - 100 fL    MCH 29.1 26.0 - 34.0 pg    MCHC 32.0 32.0 - 36.0 g/dL    RDW 16.9 (H) 11.5 - 14.5 %    Platelets 326 150 - 450 x10*3/uL   Hepatic function panel   Result Value Ref Range    Albumin 3.6 3.4 - 5.0 g/dL    Bilirubin, Total 0.5 0.0 - 1.2 mg/dL    Bilirubin, Direct 0.1 0.0 - 0.3 mg/dL    Alkaline Phosphatase 265 (H) 33 - 136 U/L    ALT 38 7 - 45 U/L    AST 34 9 - 39 U/L    Total Protein 6.8 6.4 - 8.2 g/dL   Magnesium   Result Value Ref Range    Magnesium 2.01 1.60 - 2.40 mg/dL   Phosphorus   Result Value Ref Range    Phosphorus 5.1 (H) 2.5 - 4.9 mg/dL   Basic Metabolic Panel   Result Value Ref Range    Glucose 159 (H) 74 - 99 mg/dL    Sodium 137 136 - 145 mmol/L    Potassium 4.1 3.5 - 5.3 mmol/L    Chloride 101 98 - 107 mmol/L    Bicarbonate 31 21 - 32 mmol/L    Anion Gap 9 (L) 10 - 20  "mmol/L    Urea Nitrogen 17 6 - 23 mg/dL    Creatinine 0.65 0.50 - 1.05 mg/dL    eGFR 82 >60 mL/min/1.73m*2    Calcium 9.2 8.6 - 10.3 mg/dL   Green Top   Result Value Ref Range    Extra Tube Hold for add-ons.    Urinalysis with Reflex Culture and Microscopic   Result Value Ref Range    Color, Urine Yellow Straw, Yellow    Appearance, Urine Clear Clear    Specific Gravity, Urine 1.011 1.005 - 1.035    pH, Urine 5.0 5.0, 5.5, 6.0, 6.5, 7.0, 7.5, 8.0    Protein, Urine NEGATIVE NEGATIVE mg/dL    Glucose, Urine >=500 (3+) (A) NEGATIVE mg/dL    Blood, Urine NEGATIVE NEGATIVE    Ketones, Urine NEGATIVE NEGATIVE mg/dL    Bilirubin, Urine NEGATIVE NEGATIVE    Urobilinogen, Urine <2.0 <2.0 mg/dL    Nitrite, Urine NEGATIVE NEGATIVE    Leukocyte Esterase, Urine TRACE (A) NEGATIVE   Microscopic Only, Urine   Result Value Ref Range    WBC, Urine 1-5 1-5, NONE /HPF    RBC, Urine NONE NONE, 1-2, 3-5 /HPF    Bacteria, Urine 1+ (A) NONE SEEN /HPF   POCT GLUCOSE   Result Value Ref Range    POCT Glucose 186 (H) 74 - 99 mg/dL        Assessment/Plan     Left proximal humerus fracture    Patient with a stable left proximal humerus fracture.  No indication that this is change in regards to the fall and subsequent \"reinjury\".  From my standpoint patient may continue her nonoperative treatment course which would involve continued passive range of motion with no lifting pulling or pushing the left upper extremity greater than 5 pounds.  A follow-up on an outpatient basis with Dr. Andrade who continues to treat her for this condition.    Jayjay Cole MD    "

## 2024-01-09 NOTE — H&P
History Of Present Illness  Toshia Méndez is a 89 y.o. female with medical history of CAD s/p CABG, history of cardiac stent placement, LVH, HTN, HLD, type II diabetes, CKD III, CVA with left sided residual deficit (Left hand numbness, weakness in LUE & LLE), pneumonia, elevated alkaline phosphatase, urinary incontinence, chronic dizziness s/p CVA when getting up or laying down, vaginal bleeding while on plavix, and hypothyroidism presented from her SNF to [facility] on 1/8/2024 with complaint of a HA, left shoulder pain, and left hip pain following a fall. The patient and her daughter Mariela who is HPO provide the history. The patient rates her left shoulder pain at 9/10.  The patient reports that she was sitting in her wheelchair when she decided to get up and go to  her [employer] bottle cap that had fallen on the ground. When bending down, she lost her balance and fell on her left side and then screamed for help. She denies any loss of consciousness.       She also complains of a frontal headache, left hip pain, dry cough when taking her pills, worsening of left shoulder pain with breathing, constipation, and unsteadiness on her feet.    Patient denies recent fever/chills, cough, cold symptoms, chest pain, palpitations, shortness of breath, abdominal pain, N/V/D, urinary symptoms or leg swelling    Past Medical History  Past Medical History:   Diagnosis Date    Abnormal weight loss     Weight loss    Anxiety disorder, unspecified     Anxiety    Cardiomegaly     LVH (left ventricular hypertrophy)    Difficulty in walking, not elsewhere classified     Difficulty walking    Disorder of arteries and arterioles, unspecified (CMS/[facility])     Carotid artery disease    Essential (primary) hypertension 11/07/2013    Benign essential hypertension    Hyperlipidemia, unspecified 11/07/2013    Hyperlipidemia    Other symptoms and signs involving the musculoskeletal system     Limb weakness    Personal history of other  diseases of the circulatory system     Personal history of coronary atherosclerosis    Personal history of other diseases of the digestive system     History of gastroesophageal reflux (GERD)    Personal history of other diseases of the nervous system and sense organs     History of diplopia    Personal history of other diseases of the respiratory system     History of chronic bronchitis    Personal history of other endocrine, nutritional and metabolic disease     History of diabetes mellitus    Personal history of other mental and behavioral disorders     History of depression    Personal history of other specified conditions     History of diarrhea    Personal history of other venous thrombosis and embolism     History of deep venous thrombosis    Stress incontinence (female) (male)     Stress incontinence       Surgical History  Past Surgical History:   Procedure Laterality Date    CARDIAC SURGERY  11/19/2014    Heart Surgery    CHOLECYSTECTOMY  11/19/2014    Cholecystectomy    COLONOSCOPY  11/19/2014    Colonoscopy (Fiberoptic)    CORONARY ARTERY BYPASS GRAFT  02/11/2014    CABG    DILATION AND CURETTAGE OF UTERUS  02/11/2014    Dilation And Curettage    LUMBAR LAMINECTOMY  03/04/2015    Laminectomy Lumbar    MR HEAD ANGIO WO IV CONTRAST  2/18/2020    MR HEAD ANGIO WO IV CONTRAST 2/18/2020 [facility]    MR NECK ANGIO WO IV CONTRAST  2/18/2020    MR NECK ANGIO WO IV CONTRAST 2/18/2020 [facility]    OTHER SURGICAL HISTORY  10/26/2021    Bronchoscopy    OTHER SURGICAL HISTORY  10/26/2021    Arterial stent placement    OTHER SURGICAL HISTORY  10/26/2021    Back surgery    OTHER SURGICAL HISTORY  10/26/2021    Cataract surgery    OTHER SURGICAL HISTORY  10/26/2021    Coronary artery bypass graft    OTHER SURGICAL HISTORY  10/26/2021    Pacemaker insertion    OTHER SURGICAL HISTORY  10/26/2021    Esophagogastroduodenoscopy    OTHER SURGICAL HISTORY  02/11/2014    Previous Stent Placement     "TONSILLECTOMY  11/19/2014    Tonsillectomy        Social History  Social History     Tobacco Use    Smoking status: Never    Smokeless tobacco: Never   Substance Use Topics    Alcohol use: Not Currently    Drug use: Not Currently        Family History  No family history on file.     Allergies  Iodinated contrast media, Iodine, Sulfa (sulfonamide antibiotics), and Sulfamethoxazole-trimethoprim    Review of Systems   Constitutional:  Negative for chills and fever.   Eyes:  Negative for visual disturbance.       Physical Exam    Last Recorded Vitals  Visit Vitals  /63 (BP Location: Right arm)   Pulse 81   Temp 36.4 °C (97.5 °F) (Temporal)   Resp 18   Ht 1.6 m (5' 3\")   Wt 61.7 kg (136 lb 0.4 oz)   SpO2 94%   BMI 24.10 kg/m²   OB Status Postmenopausal   Smoking Status Never   BSA 1.66 m²        Relevant Results  Results for orders placed or performed during the hospital encounter of 01/08/24 (from the past 24 hour(s))   CBC and Auto Differential   Result Value Ref Range    WBC 8.7 4.4 - 11.3 x10*3/uL    nRBC 0.0 0.0 - 0.0 /100 WBCs    RBC 5.02 4.00 - 5.20 x10*6/uL    Hemoglobin 14.7 12.0 - 16.0 g/dL    Hematocrit 44.1 36.0 - 46.0 %    MCV 88 80 - 100 fL    MCH 29.3 26.0 - 34.0 pg    MCHC 33.3 32.0 - 36.0 g/dL    RDW 16.4 (H) 11.5 - 14.5 %    Platelets 353 150 - 450 x10*3/uL    Neutrophils % 66.1 40.0 - 80.0 %    Immature Granulocytes %, Automated 0.2 0.0 - 0.9 %    Lymphocytes % 24.7 13.0 - 44.0 %    Monocytes % 6.9 2.0 - 10.0 %    Eosinophils % 1.5 0.0 - 6.0 %    Basophils % 0.6 0.0 - 2.0 %    Neutrophils Absolute 5.73 (H) 1.60 - 5.50 x10*3/uL    Immature Granulocytes Absolute, Automated 0.02 0.00 - 0.50 x10*3/uL    Lymphocytes Absolute 2.14 0.80 - 3.00 x10*3/uL    Monocytes Absolute 0.60 0.05 - 0.80 x10*3/uL    Eosinophils Absolute 0.13 0.00 - 0.40 x10*3/uL    Basophils Absolute 0.05 0.00 - 0.10 x10*3/uL   Comprehensive metabolic panel   Result Value Ref Range    Glucose 223 (H) 74 - 99 mg/dL    Sodium 132 (L) " 136 - 145 mmol/L    Potassium 4.6 3.5 - 5.3 mmol/L    Chloride 96 (L) 98 - 107 mmol/L    Bicarbonate 24 21 - 32 mmol/L    Anion Gap 17 10 - 20 mmol/L    Urea Nitrogen 19 6 - 23 mg/dL    Creatinine 0.76 0.50 - 1.05 mg/dL    eGFR 73 >60 mL/min/1.73m*2    Calcium 10.4 (H) 8.6 - 10.3 mg/dL    Albumin 4.5 3.4 - 5.0 g/dL    Alkaline Phosphatase 315 (H) 33 - 136 U/L    Total Protein 9.4 (H) 6.4 - 8.2 g/dL    AST 38 9 - 39 U/L    Bilirubin, Total 0.5 0.0 - 1.2 mg/dL    ALT 39 7 - 45 U/L   Protime-INR   Result Value Ref Range    Protime 12.2 9.8 - 12.8 seconds    INR 1.1 0.9 - 1.1   POCT GLUCOSE   Result Value Ref Range    POCT Glucose 204 (H) 74 - 99 mg/dL      Imaging:  XR chest 1 view   Final Result   1.  No evidence of acute cardiopulmonary process.   2. There is redemonstration of displaced comminuted fracture of the   surgical neck of humerus. There is nonunion and persistent  medial   displacement of the distal fracture fragment. No shoulder dislocation.             Signed by: Silvino Jack 1/8/2024 8:03 PM   Dictation workstation:   [id number]      XR shoulder left 2+ views   Final Result   1.  No evidence of acute cardiopulmonary process.   2. There is redemonstration of displaced comminuted fracture of the   surgical neck of humerus. There is nonunion and persistent  medial   displacement of the distal fracture fragment. No shoulder dislocation.             Signed by: Silvino Jack 1/8/2024 8:03 PM   Dictation workstation:   [id number]      XR hip right with pelvis when performed 2 or 3 views   Final Result   No radiographic evidence of fracture or dislocation.             MACRO:   None        Signed by: Silvino Jack 1/8/2024 7:59 PM   Dictation workstation:   [id number]      CT head wo IV contrast   Final Result   No acute intracranial abnormality was identified.        No detected fracture.        Stable pre-existing findings as reported.        MACRO:   None             Signed by: Buck Anna 1/8/2024  7:05 PM   Dictation workstation:   [id number]      CT cervical spine wo IV contrast   Final Result   No acute intracranial abnormality was identified.        No detected fracture.        Stable pre-existing findings as reported.        MACRO:   None             Signed by: Buck Anna 1/8/2024 7:05 PM   Dictation workstation:   [id number]        EKG:  Encounter Date: 11/23/23   ECG 12 lead   Result Value    Ventricular Rate 82    Atrial Rate 82    MO Interval 228    QRS Duration 154    QT Interval 448    QTC Calculation(Bazett) 523    P Axis 68    R Axis 92    T Axis -79    QRS Count 14    Q Onset 196    P Onset 82    P Offset 132    T Offset 420    QTC Fredericia 497    Narrative    Sinus rhythm with 1st degree AV block  Possible Left atrial enlargement  Rightward axis  Nonspecific intraventricular block  Abnormal ECG  When compared with ECG of 08-JUN-2023 15:44,  No significant change was found  Confirmed by Dolly Kahn ([id number]) on 11/30/2023 11:17:37 PM     Echo:  No results found for this or any previous visit.    Home Medications  Prior to Admission medications    Medication Sig Start Date End Date Taking? Authorizing Provider   acetaminophen (Tylenol) 325 mg tablet Take 2 tablets (650 mg) by mouth every 4 hours if needed for mild pain (1 - 3). 11/25/23   LUNA Albert-CNP   ALPRAZolam (Xanax) 0.25 mg tablet Take 0.5 tablets (0.125 mg) by mouth 2 times a day.    Historical Provider, MD   amLODIPine (Norvasc) 10 mg tablet Take 1 tablet (10 mg) by mouth once daily.    Historical Provider, MD   ascorbic acid (Vitamin C) 500 mg tablet Take 1 tablet (500 mg) by mouth once daily.    Historical Provider, MD   aspirin 81 mg EC tablet Take 1 tablet (81 mg) by mouth once daily.    Historical Provider, MD   atorvastatin (Lipitor) 80 mg tablet Take 1 tablet (80 mg) by mouth once daily.    Historical Provider, MD   biotin 5 mg capsule Take 1 capsule (5 mg) by mouth once daily.    Historical Provider, MD    chlorproMAZINE (Thorazine) 10 mg tablet Take 1 tablet (10 mg) by mouth once daily at bedtime.    Historical Provider, MD   empagliflozin (Jardiance) 10 mg Take 1 tablet (10 mg) by mouth once daily.    Historical Provider, MD   folic acid (Folvite) 1 mg tablet Take 1 tablet (1 mg) by mouth once daily.    Historical Provider, MD   furosemide (Lasix) 20 mg tablet Take 1 tablet (20 mg) by mouth once a day on Monday, Wednesday, and Friday.    Historical Provider, MD   glimepiride (Amaryl) 4 mg tablet Take 1 tablet (4 mg) by mouth 2 times a day.    Historical Provider, MD   levothyroxine (Synthroid, Levoxyl) 100 mcg tablet Take 1 tablet (100 mcg) by mouth once daily in the morning. Take before meals.    Historical Provider, MD   losartan (Cozaar) 100 mg tablet Take 1 tablet (100 mg) by mouth once daily.    Historical Provider, MD   metoprolol tartrate (Lopressor) 75 mg tablet Take 1 tablet (75 mg) by mouth 2 times a day. 11/25/23   Nikole Brown APRN-CNP   multivitamin with minerals tablet Take 1 tablet by mouth once daily.    Historical Provider, MD   omega-3 acid ethyl esters (Lovaza) 1 gram capsule Take 2 capsules (2 g) by mouth once daily.    Historical Provider, MD   omeprazole (PriLOSEC) 10 mg DR capsule Take 1 capsule (10 mg) by mouth once daily in the morning. Take before meals.    Historical Provider, MD   polyethylene glycol (Glycolax, Miralax) 17 gram packet Take 17 g by mouth once daily as needed (constipation). 11/25/23   LUNA Albert-CNP   trifluoperazine (Stelazine) 2 mg tablet Take 1 tablet (2 mg) by mouth once daily at bedtime.    Historical Provider, MD       Medications  Scheduled medications  [Held by provider] acetaminophen, 650 mg, oral, q6h  ALPRAZolam, 0.125 mg, oral, BID  [START ON 1/9/2024] amLODIPine, 10 mg, oral, Daily  [START ON 1/9/2024] ascorbic acid, 500 mg, oral, Daily  [Held by provider] aspirin, 81 mg, oral, Daily  [Held by provider] atorvastatin, 80 mg, oral,  Daily  chlorproMAZINE, 10 mg, oral, Nightly  [Held by provider] empagliflozin, 10 mg, oral, Daily  [START ON 1/9/2024] folic acid, 1 mg, oral, Daily  furosemide, 20 mg, oral, Every Mon/Wed/Fri  [Held by provider] glimepiride, 4 mg, oral, BID  insulin lispro, 0-10 Units, subcutaneous, q4h  [START ON 1/9/2024] levothyroxine, 100 mcg, oral, Daily before breakfast  [START ON 1/9/2024] lidocaine, 1 patch, transdermal, Daily  [START ON 1/9/2024] losartan, 100 mg, oral, Daily  melatonin, 3 mg, oral, Daily  metoprolol tartrate, 75 mg, oral, BID  [START ON 1/9/2024] multivitamin with minerals, 1 tablet, oral, Daily  [START ON 1/9/2024] pantoprazole, 20 mg, oral, Daily before breakfast  trifluoperazine, 2 mg, oral, Nightly      Continuous medications     PRN medications  bisacodyl, 10 mg, Daily PRN  dextrose 10 % in water (D10W), 0.3 g/kg/hr, Once PRN  dextrose, 25 g, q15 min PRN  glucagon, 1 mg, q15 min PRN  naloxone, 0.2 mg, q5 min PRN  ondansetron ODT, 4 mg, q8h PRN   Or  ondansetron, 4 mg, q8h PRN  oxyCODONE, 2.5 mg, q4h PRN  oxyCODONE, 5 mg, q4h PRN  oxyCODONE, 7.5 mg, q6h PRN  polyethylene glycol, 17 g, Daily PRN        Assessment/Plan   Principal Problem:    Other closed nondisplaced fracture of proximal end of left humerus, initial encounter  Fall  Left Hip Pain  HA  Wheezing   Type II diabetes with hyperglycemia  Hypercalcemia  Elevated Alkaline Phosphatase  Acute Urinary Retention     Plan:  Ortho consult   Consider urology consult   Aspiration, fall and skin care precautions   Incentive spirometry   Duoneb  Respiratory eval  Pain control   Morning labs  Patient supposed to have CT abd/pelvis Thursday 1/11/24 to evaluate for liver pathology ordered by her PCP & supposed to see GI Dr Fercho Benitez; says her PCP Dr Villagran stopped her tylenol & statin due to elevated liver function   PT/OT   Diabetes meds on hold while NPO  UA w/reflex to CX    VTE prophylaxis:   DVT prophylaxis: SCDs    Medication reconciliation to be  completed when home medications are verified by pharmacy.   See additional orders for further plan of care.   Further evaluation and management per attending and consulting physicians.      Code Status  Full code    I spent 60 minutes in the professional and overall care of this patient.      Vidal Andrews PA-C  [facility]  Pager [phone]               *Please note this report has been produced using speech recognition software and may contain errors related to that system including grammar, punctuation and spelling as well as words and phrases that may be inappropriate. If there are questions or concerns, please feel free to contact me to clarify.

## 2024-01-09 NOTE — PROGRESS NOTES
Physical Therapy                 Therapy Communication Note    Patient Name: Toshia Méndez  MRN: [id number]  Today's Date: 1/9/2024     Discipline: Physical Therapy    Missed Time: Attempt    Comment: PT orders received, chart reviewed.  Spoke with RN, pt sleeping soundly at this time.  Will re-attempt PT eval as time permits.    Per ortho, patient may continue her nonoperative treatment course which would involve continued passive range of motion with no lifting pulling or pushing the left upper extremity greater than 5 pounds.

## 2024-01-09 NOTE — CONSULTS
Reason For Consult  Left proximal humerus fracture status post fall     History Of Present Illness  Toshia Méndez is a 89 y.o. female presenting with fall from nursing facility with resultant right hip and left shoulder pain.  Patient has been previously seen in consultation by Dr. Andrade with a displaced left proximal humerus fracture.  Decision at that time was to proceed with nonoperative management involving the left proximal humerus fracture.  Patient noted increased pain status post fall.  Patient fairly drowsy and somewhat confused on my examination but is able to follow commands and is able to answer questions.  Localizes pain to left proximal shoulder girdle.     Past Medical History  She has a past medical history of Abnormal weight loss, Anxiety disorder, unspecified, Cardiomegaly, Difficulty in walking, not elsewhere classified, Disorder of arteries and arterioles, unspecified (CMS/[facility]), Essential (primary) hypertension (11/07/2013), Hyperlipidemia, unspecified (11/07/2013), Other symptoms and signs involving the musculoskeletal system, Personal history of other diseases of the circulatory system, Personal history of other diseases of the digestive system, Personal history of other diseases of the nervous system and sense organs, Personal history of other diseases of the respiratory system, Personal history of other endocrine, nutritional and metabolic disease, Personal history of other mental and behavioral disorders, Personal history of other specified conditions, Personal history of other venous thrombosis and embolism, and Stress incontinence (female) (male).     Surgical History  She has a past surgical history that includes Other surgical history (10/26/2021); Other surgical history (10/26/2021); Other surgical history (10/26/2021); Other surgical history (10/26/2021); Other surgical history (10/26/2021); Other surgical history (10/26/2021); Other surgical history (10/26/2021); Lumbar  "laminectomy (03/04/2015); Cholecystectomy (11/19/2014); Tonsillectomy (11/19/2014); Cardiac surgery (11/19/2014); Colonoscopy (11/19/2014); Coronary artery bypass graft (02/11/2014); Other surgical history (02/11/2014); Dilation and curettage of uterus (02/11/2014); MR angio head wo IV contrast (2/18/2020); and MR angio neck wo IV contrast (2/18/2020).     Social History  She reports that she has never smoked. She has never used smokeless tobacco. She reports that she does not currently use alcohol. She reports that she does not currently use drugs.     Family History  Family History   No family history on file.        Allergies  Iodinated contrast media, Iodine, Sulfa (sulfonamide antibiotics), and Sulfamethoxazole-trimethoprim     Review of Systems  Denies any current chest pain or shortness of breath     Physical Exam  Left upper extremity: Patient has a gross deformity appreciated about the proximal shoulder girdle.  Skin is overall intact and no acute ecchymosis or edema is identified.  Gentle manipulation of the distal humerus appears to move the glenohumeral joint to some degree.  Otherwise neurovascular intact distally.  Tender to palpation about the left proximal humerus at this time.     Last Recorded Vitals  Blood pressure 150/76, pulse 78, temperature 36.1 °C (97 °F), temperature source Temporal, resp. rate 17, height 1.6 m (5' 3\"), weight 62.1 kg (136 lb 14.5 oz), SpO2 93 %.     Relevant Results  AP and scapular Y views of the left shoulder were pendantly reviewed.     Findings demonstrate displaced angulated left proximal humerus fracture with greater than 1 shaft with displacement.  Patient's radiographs demonstrate some callus formation likely consistent with some form of early healing.     Scheduled medications  [Held by provider] acetaminophen, 650 mg, oral, q6h  ALPRAZolam, 0.125 mg, oral, BID  amLODIPine, 10 mg, oral, Daily  ascorbic acid, 500 mg, oral, Daily  [Held by provider] aspirin, 81 mg, " oral, Daily  [Held by provider] atorvastatin, 80 mg, oral, Daily  chlorproMAZINE, 10 mg, oral, Nightly  [Held by provider] empagliflozin, 10 mg, oral, Daily  folic acid, 1 mg, oral, Daily  furosemide, 20 mg, oral, Every Mon/Wed/Fri  [Held by provider] glimepiride, 4 mg, oral, BID  insulin lispro, 0-10 Units, subcutaneous, q4h  ipratropium-albuteroL, 3 mL, nebulization, q6h  levothyroxine, 100 mcg, oral, Daily before breakfast  lidocaine, 1 patch, transdermal, Daily  losartan, 100 mg, oral, Daily  melatonin, 3 mg, oral, Daily  metoprolol tartrate, 75 mg, oral, BID  multivitamin with minerals, 1 tablet, oral, Daily  pantoprazole, 20 mg, oral, Daily before breakfast        Continuous medications  PRN medications  PRN medications: bisacodyl, dextrose 10 % in water (D10W), dextrose, glucagon, naloxone, ondansetron ODT **OR** ondansetron, oxyCODONE, oxyCODONE, oxyCODONE, polyethylene glycol        Results for orders placed or performed during the hospital encounter of 01/08/24 (from the past 24 hour(s))   CBC and Auto Differential   Result Value Ref Range     WBC 8.7 4.4 - 11.3 x10*3/uL     nRBC 0.0 0.0 - 0.0 /100 WBCs     RBC 5.02 4.00 - 5.20 x10*6/uL     Hemoglobin 14.7 12.0 - 16.0 g/dL     Hematocrit 44.1 36.0 - 46.0 %     MCV 88 80 - 100 fL     MCH 29.3 26.0 - 34.0 pg     MCHC 33.3 32.0 - 36.0 g/dL     RDW 16.4 (H) 11.5 - 14.5 %     Platelets 353 150 - 450 x10*3/uL     Neutrophils % 66.1 40.0 - 80.0 %     Immature Granulocytes %, Automated 0.2 0.0 - 0.9 %     Lymphocytes % 24.7 13.0 - 44.0 %     Monocytes % 6.9 2.0 - 10.0 %     Eosinophils % 1.5 0.0 - 6.0 %     Basophils % 0.6 0.0 - 2.0 %     Neutrophils Absolute 5.73 (H) 1.60 - 5.50 x10*3/uL     Immature Granulocytes Absolute, Automated 0.02 0.00 - 0.50 x10*3/uL     Lymphocytes Absolute 2.14 0.80 - 3.00 x10*3/uL     Monocytes Absolute 0.60 0.05 - 0.80 x10*3/uL     Eosinophils Absolute 0.13 0.00 - 0.40 x10*3/uL     Basophils Absolute 0.05 0.00 - 0.10 x10*3/uL    Comprehensive metabolic panel   Result Value Ref Range     Glucose 223 (H) 74 - 99 mg/dL     Sodium 132 (L) 136 - 145 mmol/L     Potassium 4.6 3.5 - 5.3 mmol/L     Chloride 96 (L) 98 - 107 mmol/L     Bicarbonate 24 21 - 32 mmol/L     Anion Gap 17 10 - 20 mmol/L     Urea Nitrogen 19 6 - 23 mg/dL     Creatinine 0.76 0.50 - 1.05 mg/dL     eGFR 73 >60 mL/min/1.73m*2     Calcium 10.4 (H) 8.6 - 10.3 mg/dL     Albumin 4.5 3.4 - 5.0 g/dL     Alkaline Phosphatase 315 (H) 33 - 136 U/L     Total Protein 9.4 (H) 6.4 - 8.2 g/dL     AST 38 9 - 39 U/L     Bilirubin, Total 0.5 0.0 - 1.2 mg/dL     ALT 39 7 - 45 U/L   Protime-INR   Result Value Ref Range     Protime 12.2 9.8 - 12.8 seconds     INR 1.1 0.9 - 1.1   POCT GLUCOSE   Result Value Ref Range     POCT Glucose 204 (H) 74 - 99 mg/dL   POCT GLUCOSE   Result Value Ref Range     POCT Glucose 183 (H) 74 - 99 mg/dL   POCT GLUCOSE   Result Value Ref Range     POCT Glucose 171 (H) 74 - 99 mg/dL   Gamma-Glutamyl Transferase   Result Value Ref Range      (H) 5 - 55 U/L   CBC   Result Value Ref Range     WBC 8.6 4.4 - 11.3 x10*3/uL     nRBC 0.0 0.0 - 0.0 /100 WBCs     RBC 4.36 4.00 - 5.20 x10*6/uL     Hemoglobin 12.7 12.0 - 16.0 g/dL     Hematocrit 39.7 36.0 - 46.0 %     MCV 91 80 - 100 fL     MCH 29.1 26.0 - 34.0 pg     MCHC 32.0 32.0 - 36.0 g/dL     RDW 16.9 (H) 11.5 - 14.5 %     Platelets 326 150 - 450 x10*3/uL   Hepatic function panel   Result Value Ref Range     Albumin 3.6 3.4 - 5.0 g/dL     Bilirubin, Total 0.5 0.0 - 1.2 mg/dL     Bilirubin, Direct 0.1 0.0 - 0.3 mg/dL     Alkaline Phosphatase 265 (H) 33 - 136 U/L     ALT 38 7 - 45 U/L     AST 34 9 - 39 U/L     Total Protein 6.8 6.4 - 8.2 g/dL   Magnesium   Result Value Ref Range     Magnesium 2.01 1.60 - 2.40 mg/dL   Phosphorus   Result Value Ref Range     Phosphorus 5.1 (H) 2.5 - 4.9 mg/dL   Basic Metabolic Panel   Result Value Ref Range     Glucose 159 (H) 74 - 99 mg/dL     Sodium 137 136 - 145 mmol/L     Potassium 4.1  "3.5 - 5.3 mmol/L     Chloride 101 98 - 107 mmol/L     Bicarbonate 31 21 - 32 mmol/L     Anion Gap 9 (L) 10 - 20 mmol/L     Urea Nitrogen 17 6 - 23 mg/dL     Creatinine 0.65 0.50 - 1.05 mg/dL     eGFR 82 >60 mL/min/1.73m*2     Calcium 9.2 8.6 - 10.3 mg/dL   Green Top   Result Value Ref Range     Extra Tube Hold for add-ons.     Urinalysis with Reflex Culture and Microscopic   Result Value Ref Range     Color, Urine Yellow Straw, Yellow     Appearance, Urine Clear Clear     Specific Gravity, Urine 1.011 1.005 - 1.035     pH, Urine 5.0 5.0, 5.5, 6.0, 6.5, 7.0, 7.5, 8.0     Protein, Urine NEGATIVE NEGATIVE mg/dL     Glucose, Urine >=500 (3+) (A) NEGATIVE mg/dL     Blood, Urine NEGATIVE NEGATIVE     Ketones, Urine NEGATIVE NEGATIVE mg/dL     Bilirubin, Urine NEGATIVE NEGATIVE     Urobilinogen, Urine <2.0 <2.0 mg/dL     Nitrite, Urine NEGATIVE NEGATIVE     Leukocyte Esterase, Urine TRACE (A) NEGATIVE   Microscopic Only, Urine   Result Value Ref Range     WBC, Urine 1-5 1-5, NONE /HPF     RBC, Urine NONE NONE, 1-2, 3-5 /HPF     Bacteria, Urine 1+ (A) NONE SEEN /HPF   POCT GLUCOSE   Result Value Ref Range     POCT Glucose 186 (H) 74 - 99 mg/dL            Assessment/Plan   Left proximal humerus fracture     Patient with a stable left proximal humerus fracture.  No indication that this is change in regards to the fall and subsequent \"reinjury\".  From my standpoint patient may continue her nonoperative treatment course which would involve continued passive range of motion with no lifting pulling or pushing the left upper extremity greater than 5 pounds.  A follow-up on an outpatient basis with Dr. Andrade who continues to treat her for this condition.     Jayjay Cole MD  "

## 2024-01-09 NOTE — H&P
History Of Present Illness  Toshia Méndez is a 89 y.o. female presenting with 89-year-old female with a history of CAD status post CABG.  She has multiple other medical problems.  Patient has been having some dizziness when she gets up and moves around.  She comes in from a local nursing facility to the emergency department with a complaint of headache, left shoulder and hip pain status post fall.  Patient's reports she was sitting in a wheelchair when she decided to get up she fell to the ground and she is lost her balance since then.     Past Medical History  Past Medical History:   Diagnosis Date    Abnormal weight loss     Weight loss    Anxiety disorder, unspecified     Anxiety    Cardiomegaly     LVH (left ventricular hypertrophy)    Difficulty in walking, not elsewhere classified     Difficulty walking    Disorder of arteries and arterioles, unspecified (CMS/[facility])     Carotid artery disease    Essential (primary) hypertension 11/07/2013    Benign essential hypertension    Hyperlipidemia, unspecified 11/07/2013    Hyperlipidemia    Other symptoms and signs involving the musculoskeletal system     Limb weakness    Personal history of other diseases of the circulatory system     Personal history of coronary atherosclerosis    Personal history of other diseases of the digestive system     History of gastroesophageal reflux (GERD)    Personal history of other diseases of the nervous system and sense organs     History of diplopia    Personal history of other diseases of the respiratory system     History of chronic bronchitis    Personal history of other endocrine, nutritional and metabolic disease     History of diabetes mellitus    Personal history of other mental and behavioral disorders     History of depression    Personal history of other specified conditions     History of diarrhea    Personal history of other venous thrombosis and embolism     History of deep venous thrombosis    Stress incontinence  (female) (male)     Stress incontinence       Surgical History  Past Surgical History:   Procedure Laterality Date    CARDIAC SURGERY  11/19/2014    Heart Surgery    CHOLECYSTECTOMY  11/19/2014    Cholecystectomy    COLONOSCOPY  11/19/2014    Colonoscopy (Fiberoptic)    CORONARY ARTERY BYPASS GRAFT  02/11/2014    CABG    DILATION AND CURETTAGE OF UTERUS  02/11/2014    Dilation And Curettage    LUMBAR LAMINECTOMY  03/04/2015    Laminectomy Lumbar    MR HEAD ANGIO WO IV CONTRAST  2/18/2020    MR HEAD ANGIO WO IV CONTRAST 2/18/2020 [facility]    MR NECK ANGIO WO IV CONTRAST  2/18/2020    MR NECK ANGIO WO IV CONTRAST 2/18/2020 [facility] CL[facility]ICAL [facility]    OTHER SURGICAL HISTORY  10/26/2021    Bronchoscopy    OTHER SURGICAL HISTORY  10/26/2021    Arterial stent placement    OTHER SURGICAL HISTORY  10/26/2021    Back surgery    OTHER SURGICAL HISTORY  10/26/2021    Cataract surgery    OTHER SURGICAL HISTORY  10/26/2021    Coronary artery bypass graft    OTHER SURGICAL HISTORY  10/26/2021    Pacemaker insertion    OTHER SURGICAL HISTORY  10/26/2021    Esophagogastroduodenoscopy    OTHER SURGICAL HISTORY  02/11/2014    Previous Stent Placement    TONSILLECTOMY  11/19/2014    Tonsillectomy        Social History  She reports that she has never smoked. She has never used smokeless tobacco. She reports that she does not currently use alcohol. She reports that she does not currently use drugs.    Family History  No family history on file.     Allergies  Iodinated contrast media, Iodine, Sulfa (sulfonamide antibiotics), and Sulfamethoxazole-trimethoprim    Review of Systems  As above  Physical Exam  HENT:      Head: Normocephalic.      Nose: Nose normal.      Mouth/Throat:      Mouth: Mucous membranes are moist.   Cardiovascular:      Rate and Rhythm: Normal rate.   Pulmonary:      Effort: Pulmonary effort is normal.   Abdominal:      General: Abdomen is flat.   Musculoskeletal:         General: Swelling, tenderness and  "deformity present.      Cervical back: Normal range of motion.   Skin:     General: Skin is warm.   Neurological:      General: No focal deficit present.      Mental Status: She is alert.   Psychiatric:         Mood and Affect: Mood normal.     Pain with trying to sit the patient up.  Decreased range of motion of the left shoulder     Last Recorded Vitals  Blood pressure 120/58, pulse 68, temperature 36.2 °C (97.2 °F), temperature source Temporal, resp. rate 17, height 1.6 m (5' 3\"), weight 62.1 kg (136 lb 14.5 oz), SpO2 93 %.    Relevant Results        Scheduled medications  [Held by provider] acetaminophen, 650 mg, oral, q6h  ALPRAZolam, 0.125 mg, oral, BID  amLODIPine, 10 mg, oral, Daily  ascorbic acid, 500 mg, oral, Daily  [Held by provider] aspirin, 81 mg, oral, Daily  [Held by provider] atorvastatin, 80 mg, oral, Daily  chlorproMAZINE, 10 mg, oral, Nightly  [Held by provider] empagliflozin, 10 mg, oral, Daily  folic acid, 1 mg, oral, Daily  furosemide, 20 mg, oral, Every Mon/Wed/Fri  [Held by provider] glimepiride, 4 mg, oral, BID  insulin lispro, 0-10 Units, subcutaneous, q4h  ipratropium-albuteroL, 3 mL, nebulization, q6h  levothyroxine, 100 mcg, oral, Daily before breakfast  lidocaine, 1 patch, transdermal, Daily  losartan, 100 mg, oral, Daily  melatonin, 3 mg, oral, Daily  metoprolol tartrate, 75 mg, oral, BID  multivitamin with minerals, 1 tablet, oral, Daily  pantoprazole, 20 mg, oral, Daily before breakfast      Continuous medications     PRN medications  PRN medications: bisacodyl, dextrose 10 % in water (D10W), dextrose, glucagon, naloxone, ondansetron ODT **OR** ondansetron, oxyCODONE, oxyCODONE, oxyCODONE, polyethylene glycol'     Assessment/Plan   Principal Problem:    Other closed nondisplaced fracture of proximal end of left humerus, initial encounter  Fall  Headache  Wheezing  Type 2 diabetes  Hypercalcemia  Elevated alk phos  Urinary retention    Ortho neuro consult  DuoNebs  Respiratory " evaluation  Check liver functions  GI and urology consult  PT OT  See if there is any surgical issues required       I spent 45 minutes in the professional and overall care of this patient.      Oscar Randolph MD

## 2024-01-09 NOTE — PROGRESS NOTES
Nutrition Initial Assessment:   Nutrition Assessment    Reason for Assessment: Admission nursing screening (MST score 3 - wt loss and decreased appetite (no RD consult))    Patient is a 89 y.o. female presenting from [facility] for closed nondisplaced fracture of proximal end of L humerus. Ortho, GI, and Urology on consult. Pt daughter present and provided hx during RD visit as pt was sleeping soundly after pain medication.       Past Medical History: CAD, CABG, CVA, CKD3, DMII, Anxiety   has a past medical history of Abnormal weight loss, Anxiety disorder, unspecified, Cardiomegaly, Difficulty in walking, not elsewhere classified, Disorder of arteries and arterioles, unspecified (CMS/HCC), Essential (primary) hypertension (11/07/2013), Hyperlipidemia, unspecified (11/07/2013), Other symptoms and signs involving the musculoskeletal system, Personal history of other diseases of the circulatory system, Personal history of other diseases of the digestive system, Personal history of other diseases of the nervous system and sense organs, Personal history of other diseases of the respiratory system, Personal history of other endocrine, nutritional and metabolic disease, Personal history of other mental and behavioral disorders, Personal history of other specified conditions, Personal history of other venous thrombosis and embolism, and Stress incontinence (female) (male).  Surgical History   has a past surgical history that includes Other surgical history (10/26/2021); Other surgical history (10/26/2021); Other surgical history (10/26/2021); Other surgical history (10/26/2021); Other surgical history (10/26/2021); Other surgical history (10/26/2021); Other surgical history (10/26/2021); Lumbar laminectomy (03/04/2015); Cholecystectomy (11/19/2014); Tonsillectomy (11/19/2014); Cardiac surgery (11/19/2014); Colonoscopy (11/19/2014); Coronary artery bypass graft (02/11/2014); Other surgical history (02/11/2014);  "Dilation and curettage of uterus (02/11/2014); MR angio head wo IV contrast (2/18/2020); and MR angio neck wo IV contrast (2/18/2020).    Nutrition History:  Energy Intake: Good > 75 %  Food and Nutrient History: Pt had [facility] went to [facility] ([facility]) after hospitalization. Daughter provided hx as pt sleeping soundly after medication. Pt reportedly ate well at home (3 meals/d and snack). Pt was doing carb counting at home (~50g/meal) and was on a 1500-1600kcal/d diet which kept her wt stable at 150# and stable BG. Daughter reports she likes the food at [facility] and RN at [facility] reports % meal intakes. Pt diet at [facility] was mechanical soft, Low concentrated sweets w/chopped meats (thin liquids and straws ok).  Vitamin/Herbal Supplement Use: no oral  nutrition supplements.  Food Allergies/Intolerances:  None  GI Symptoms: None  Oral Problems: Swallowing difficulty       Anthropometrics:  Height: 160 cm (5' 3\")   Weight: 62.1 kg (136 lb 14.5 oz)   BMI (Calculated): 24.26             Weight History:     Weight Change %:  Weight History / % Weight Change: per archives 9/21/21 158#, 6/24/23 156.3#, 11/26/23 149#. Daughter reports #. 8.7% wt loss x < 2 months.  Significant Weight Loss: Yes  Interpretation of Weight Loss: >7.5% in 3 months    Nutrition Focused Physical Exam Findings:    Subcutaneous Fat Loss:   Orbital Fat Pads: Well nourshed (slightly bulging fat pads)  Buccal Fat Pads: Well nourished (full, rounded cheeks)  Triceps: Well nourished (ample fat tissue)  Muscle Wasting:  Temporalis: Mild-Moderate (slight depression)  Pectoralis (Clavicular Region): Mild-Moderate (some protrusion of clavicle)  Quadriceps: Well nourished (well developed, well rounded)  Gastrocnemius: Well nourished (well developed bulbous muscle)  Edema:  Edema: none  Physical Findings:  Skin:  (bruising)    Nutrition Significant Labs:  CBC Trend:   Results from last 7 days   Lab " "Units 01/09/24  0740 01/08/24 1948   WBC AUTO x10*3/uL 8.6 8.7   RBC AUTO x10*6/uL 4.36 5.02   HEMOGLOBIN g/dL 12.7 14.7   HEMATOCRIT % 39.7 44.1   MCV fL 91 88   PLATELETS AUTO x10*3/uL 326 353    , BMP Trend:   Results from last 7 days   Lab Units 01/09/24  0740 01/08/24 1948   GLUCOSE mg/dL 159* 223*   CALCIUM mg/dL 9.2 10.4*   SODIUM mmol/L 137 132*   POTASSIUM mmol/L 4.1 4.6   CO2 mmol/L 31 24   CHLORIDE mmol/L 101 96*   BUN mg/dL 17 19   CREATININE mg/dL 0.65 0.76    , A1C:  Lab Results   Component Value Date    HGBA1C 8.3 (A) 06/07/2023   , BG POCT trend:   Results from last 7 days   Lab Units 01/09/24  1521 01/09/24  1142 01/09/24  0706 01/09/24  0415 01/08/24  2306   POCT GLUCOSE mg/dL 156* 186* 171* 183* 204*    , Liver Function Trend:   Results from last 7 days   Lab Units 01/09/24  0740 01/08/24 1948   ALK PHOS U/L 265* 315*   AST U/L 34 38   ALT U/L 38 39   BILIRUBIN TOTAL mg/dL 0.5 0.5    , Renal Lab Trend:   Results from last 7 days   Lab Units 01/09/24  0740 01/08/24 1948   POTASSIUM mmol/L 4.1 4.6   PHOSPHORUS mg/dL 5.1*  --    SODIUM mmol/L 137 132*   MAGNESIUM mg/dL 2.01  --    EGFR mL/min/1.73m*2 82 73   BUN mg/dL 17 19   CREATININE mg/dL 0.65 0.76    , Lipid Panel:   Lab Results   Component Value Date    CHOL 121 06/07/2023    HDL 41.2 06/07/2023    CHHDL 2.9 06/07/2023    LDLF 59 06/07/2023    VLDL 21 06/07/2023    TRIG 106 06/07/2023    , Vit D: No results found for: \"VITD25\" , Vit B12: No results found for: \"YWWCDVVU15\" , Iron Panel: No results found for: \"IRON\", \"TIBC\", \"FERRITIN\" , Folate: No results found for: \"FOLATE\"     Nutrition Specific Medications:      I/O:   Last BM Date: 01/07/24;          Dietary Orders (From admission, onward)       Start     Ordered    01/09/24 1623  Oral nutritional supplements  Until discontinued        Comments: vanilla   Question Answer Comment   Deliver with All meals    Select supplement: Ensure High Protein        01/09/24 1626    01/09/24 1353  " Adult diet Carb Controlled; 60 gram carb/meal, 30 gram Carb evening snack; Minced/moist food 5; Thin 0  Diet effective now        Question Answer Comment   Diet type Carb Controlled    Carb diet selection: 60 gram carb/meal, 30 gram Carb evening snack    Texture Minced/moist food 5    Fluid consistency Thin 0        01/09/24 1353                     Estimated Needs:   Total Energy Estimated Needs (kCal):  (1550-1850kcal/d (25-30kcal/d))     Total Protein Estimated Needs (g):  (62-87g/d (1.0-1.4g/kg))     Total Fluid Estimated Needs (mL):  (1mL/kcal)           Nutrition Diagnosis   Malnutrition Diagnosis  Patient has Malnutrition Diagnosis: Yes  Diagnosis Status: New  Malnutrition Diagnosis: Moderate malnutrition related to acute disease or injury  As Evidenced by: 8.7% unintentional wt loss x <2 months and pt w/moderate muscle wasting.            Nutrition Interventions/Recommendations         Nutrition Prescription:  Individualized Nutrition Prescription Provided for : Diet: continue with Carb Controlled diet of 60g carb/meal, 30g carb/snack  as ordered. Added minced/moist level 5 texture and thin liquids as pt was on a modified diet PTA at [facility].        Nutrition Interventions:   Food and/or Nutrient Delivery Interventions  Interventions: Meals and snacks, Medical food supplement  Meals and Snacks: Carbohydrate-modified diet  Goal: pt will consume 75% of meals.  Medical Food Supplement: Commercial beverage  Goal: will provide Ensure High Protein (160kcal, 16g pro per 8oz serving) TID w/meals    Coordination of Nutrition Care by a Nutrition Professional  Collaboration and Referral of Nutrition Care: Collaboration by nutrition professional with other providers  Goal: RN Jessica, SLP Jennifer, INDIO Drake, JUAN J at [facility].    Nutrition Education:   Pt not appropriate        Nutrition Monitoring and Evaluation   Food/Nutrient Related History Monitoring  Monitoring and Evaluation Plan: Energy intake  Energy Intake: Estimated energy  intake  Criteria: will meet 75% of estimated needs. Will consume oral nutrition supplements provided.    Body Composition/Growth/Weight History  Monitoring and Evaluation Plan: Weight  Weight: Measured weight  Criteria: maintain stable wt.                 Time Spent/Follow-up Reminder:   Time Spent (min): 60 minutes  Last Date of Nutrition Visit: 01/09/24  Nutrition Follow-Up Needed?: 3-5 days  Follow up Comment: ZOYA

## 2024-01-09 NOTE — CARE PLAN
Problem: Pain  Goal: Takes deep breaths with improved pain control throughout the shift  Outcome: Progressing  Goal: Turns in bed with improved pain control throughout the shift  Outcome: Progressing  Goal: Walks with improved pain control throughout the shift  Outcome: Progressing  Goal: Performs ADL's with improved pain control throughout shift  Outcome: Progressing  Goal: Participates in PT with improved pain control throughout the shift  Outcome: Progressing  Goal: Free from opioid side effects throughout the shift  Outcome: Progressing  Goal: Free from acute confusion related to pain meds throughout the shift  Outcome: Progressing     Problem: Fall/Injury  Goal: Not fall by end of shift  Outcome: Progressing  Goal: Be free from injury by end of the shift  Outcome: Progressing  Goal: Verbalize understanding of personal risk factors for fall in the hospital  Outcome: Progressing  Goal: Verbalize understanding of risk factor reduction measures to prevent injury from fall in the home  Outcome: Progressing  Goal: Use assistive devices by end of the shift  Outcome: Progressing  Goal: Pace activities to prevent fatigue by end of the shift  Outcome: Progressing     Problem: Safety  Goal: Patient will be injury free during hospitalization  Outcome: Progressing  Goal: I will remain free of falls  Outcome: Progressing   The patient's goals for the shift include keep me comfortable    The clinical goals for the shift include completed

## 2024-01-09 NOTE — CARE PLAN
Problem: Skin  Goal: Decreased wound size/increased tissue granulation at next dressing change  Outcome: Progressing  Goal: Participates in plan/prevention/treatment measures  Outcome: Progressing  Goal: Prevent/manage excess moisture  Outcome: Progressing  Goal: Prevent/minimize sheer/friction injuries  Outcome: Progressing  Goal: Promote/optimize nutrition  Outcome: Progressing  Goal: Promote skin healing  Outcome: Progressing   The patient's goals for the shift include keep me comfortable    The clinical goals for the shift include completed

## 2024-01-09 NOTE — CARE PLAN
The patient's goals for the shift include keep me comfortable    The clinical goals for the shift include be free of pain    HAS BEEN SEDATED THRU THE END OF SHIFT. NO PAIN EVIDENT

## 2024-01-10 LAB
ALBUMIN SERPL BCP-MCNC: 3.7 G/DL (ref 3.4–5)
ALP SERPL-CCNC: 251 U/L (ref 33–136)
ALT SERPL W P-5'-P-CCNC: 28 U/L (ref 7–45)
ANION GAP SERPL CALC-SCNC: 12 MMOL/L (ref 10–20)
AST SERPL W P-5'-P-CCNC: 21 U/L (ref 9–39)
BACTERIA UR CULT: ABNORMAL
BILIRUB DIRECT SERPL-MCNC: 0.2 MG/DL (ref 0–0.3)
BILIRUB SERPL-MCNC: 0.8 MG/DL (ref 0–1.2)
BUN SERPL-MCNC: 22 MG/DL (ref 6–23)
CALCIUM SERPL-MCNC: 9.8 MG/DL (ref 8.6–10.3)
CHLORIDE SERPL-SCNC: 100 MMOL/L (ref 98–107)
CO2 SERPL-SCNC: 29 MMOL/L (ref 21–32)
CREAT SERPL-MCNC: 0.63 MG/DL (ref 0.5–1.05)
EGFRCR SERPLBLD CKD-EPI 2021: 82 ML/MIN/1.73M*2
ERYTHROCYTE [DISTWIDTH] IN BLOOD BY AUTOMATED COUNT: 17 % (ref 11.5–14.5)
GLUCOSE BLD MANUAL STRIP-MCNC: 175 MG/DL (ref 74–99)
GLUCOSE BLD MANUAL STRIP-MCNC: 176 MG/DL (ref 74–99)
GLUCOSE BLD MANUAL STRIP-MCNC: 203 MG/DL (ref 74–99)
GLUCOSE BLD MANUAL STRIP-MCNC: 258 MG/DL (ref 74–99)
GLUCOSE SERPL-MCNC: 191 MG/DL (ref 74–99)
HCT VFR BLD AUTO: 41.3 % (ref 36–46)
HGB BLD-MCNC: 13.4 G/DL (ref 12–16)
MAGNESIUM SERPL-MCNC: 2 MG/DL (ref 1.6–2.4)
MCH RBC QN AUTO: 29.3 PG (ref 26–34)
MCHC RBC AUTO-ENTMCNC: 32.4 G/DL (ref 32–36)
MCV RBC AUTO: 90 FL (ref 80–100)
NRBC BLD-RTO: 0 /100 WBCS (ref 0–0)
PHOSPHATE SERPL-MCNC: 4.3 MG/DL (ref 2.5–4.9)
PLATELET # BLD AUTO: 303 X10*3/UL (ref 150–450)
POTASSIUM SERPL-SCNC: 3.9 MMOL/L (ref 3.5–5.3)
PROT SERPL-MCNC: 7.5 G/DL (ref 6.4–8.2)
RBC # BLD AUTO: 4.58 X10*6/UL (ref 4–5.2)
SODIUM SERPL-SCNC: 137 MMOL/L (ref 136–145)
WBC # BLD AUTO: 11 X10*3/UL (ref 4.4–11.3)

## 2024-01-10 PROCEDURE — G0378 HOSPITAL OBSERVATION PER HR: HCPCS

## 2024-01-10 PROCEDURE — 2500000001 HC RX 250 WO HCPCS SELF ADMINISTERED DRUGS (ALT 637 FOR MEDICARE OP): Performed by: NURSE PRACTITIONER

## 2024-01-10 PROCEDURE — 85027 COMPLETE CBC AUTOMATED: CPT | Performed by: PHYSICIAN ASSISTANT

## 2024-01-10 PROCEDURE — 97165 OT EVAL LOW COMPLEX 30 MIN: CPT | Mod: GO

## 2024-01-10 PROCEDURE — 2500000004 HC RX 250 GENERAL PHARMACY W/ HCPCS (ALT 636 FOR OP/ED): Performed by: PHYSICIAN ASSISTANT

## 2024-01-10 PROCEDURE — 2500000001 HC RX 250 WO HCPCS SELF ADMINISTERED DRUGS (ALT 637 FOR MEDICARE OP): Performed by: PHYSICIAN ASSISTANT

## 2024-01-10 PROCEDURE — 2500000005 HC RX 250 GENERAL PHARMACY W/O HCPCS: Performed by: PHYSICIAN ASSISTANT

## 2024-01-10 PROCEDURE — 82947 ASSAY GLUCOSE BLOOD QUANT: CPT | Mod: 59

## 2024-01-10 PROCEDURE — 84100 ASSAY OF PHOSPHORUS: CPT | Performed by: PHYSICIAN ASSISTANT

## 2024-01-10 PROCEDURE — 2500000002 HC RX 250 W HCPCS SELF ADMINISTERED DRUGS (ALT 637 FOR MEDICARE OP, ALT 636 FOR OP/ED): Performed by: PHYSICIAN ASSISTANT

## 2024-01-10 PROCEDURE — 83735 ASSAY OF MAGNESIUM: CPT | Performed by: PHYSICIAN ASSISTANT

## 2024-01-10 PROCEDURE — 36415 COLL VENOUS BLD VENIPUNCTURE: CPT | Performed by: PHYSICIAN ASSISTANT

## 2024-01-10 PROCEDURE — 97112 NEUROMUSCULAR REEDUCATION: CPT | Mod: GO

## 2024-01-10 PROCEDURE — 80053 COMPREHEN METABOLIC PANEL: CPT | Performed by: PHYSICIAN ASSISTANT

## 2024-01-10 PROCEDURE — 82248 BILIRUBIN DIRECT: CPT | Performed by: PHYSICIAN ASSISTANT

## 2024-01-10 PROCEDURE — 97161 PT EVAL LOW COMPLEX 20 MIN: CPT | Mod: GP

## 2024-01-10 RX ORDER — OXYCODONE HYDROCHLORIDE 5 MG/1
2.5 TABLET ORAL EVERY 4 HOURS PRN
Qty: 12 TABLET | Refills: 0 | Status: SHIPPED | OUTPATIENT
Start: 2024-01-10

## 2024-01-10 RX ORDER — TRIFLUOPERAZINE HYDROCHLORIDE 1 MG/1
1 TABLET, FILM COATED ORAL NIGHTLY
Status: DISCONTINUED | OUTPATIENT
Start: 2024-01-10 | End: 2024-01-12 | Stop reason: HOSPADM

## 2024-01-10 RX ADMIN — OXYCODONE HYDROCHLORIDE AND ACETAMINOPHEN 500 MG: 500 TABLET ORAL at 08:33

## 2024-01-10 RX ADMIN — PANTOPRAZOLE SODIUM 20 MG: 20 TABLET, DELAYED RELEASE ORAL at 06:03

## 2024-01-10 RX ADMIN — ASPIRIN 81 MG: 81 TABLET, COATED ORAL at 08:33

## 2024-01-10 RX ADMIN — LOSARTAN POTASSIUM 100 MG: 100 TABLET, FILM COATED ORAL at 08:34

## 2024-01-10 RX ADMIN — FOLIC ACID 1 MG: 1 TABLET ORAL at 08:33

## 2024-01-10 RX ADMIN — LEVOTHYROXINE SODIUM 100 MCG: 0.1 TABLET ORAL at 06:03

## 2024-01-10 RX ADMIN — INSULIN LISPRO 2 UNITS: 100 INJECTION, SOLUTION INTRAVENOUS; SUBCUTANEOUS at 16:33

## 2024-01-10 RX ADMIN — AMLODIPINE BESYLATE 10 MG: 10 TABLET ORAL at 08:34

## 2024-01-10 RX ADMIN — OXYCODONE HYDROCHLORIDE 5 MG: 5 TABLET ORAL at 12:40

## 2024-01-10 RX ADMIN — TRIFLUOPERAZINE HYDROCHLORIDE 1 MG: 1 TABLET, FILM COATED ORAL at 22:03

## 2024-01-10 RX ADMIN — OXYCODONE HYDROCHLORIDE 5 MG: 5 TABLET ORAL at 18:37

## 2024-01-10 RX ADMIN — METOPROLOL TARTRATE 75 MG: 50 TABLET, FILM COATED ORAL at 08:34

## 2024-01-10 RX ADMIN — Medication 3 MG: at 18:37

## 2024-01-10 RX ADMIN — INSULIN LISPRO 4 UNITS: 100 INJECTION, SOLUTION INTRAVENOUS; SUBCUTANEOUS at 08:45

## 2024-01-10 RX ADMIN — Medication 1 TABLET: at 08:33

## 2024-01-10 RX ADMIN — CHLORPROMAZINE HYDROCHLORIDE 10 MG: 10 TABLET, FILM COATED ORAL at 22:04

## 2024-01-10 RX ADMIN — ALPRAZOLAM 0.12 MG: 0.25 TABLET ORAL at 08:31

## 2024-01-10 RX ADMIN — INSULIN LISPRO 6 UNITS: 100 INJECTION, SOLUTION INTRAVENOUS; SUBCUTANEOUS at 12:37

## 2024-01-10 RX ADMIN — METOPROLOL TARTRATE 75 MG: 50 TABLET, FILM COATED ORAL at 22:04

## 2024-01-10 RX ADMIN — LIDOCAINE 1 PATCH: 4 PATCH TOPICAL at 08:32

## 2024-01-10 RX ADMIN — ENOXAPARIN SODIUM 40 MG: 40 INJECTION SUBCUTANEOUS at 14:14

## 2024-01-10 RX ADMIN — ALPRAZOLAM 0.12 MG: 0.25 TABLET ORAL at 22:04

## 2024-01-10 RX ADMIN — EMPAGLIFLOZIN 10 MG: 10 TABLET, FILM COATED ORAL at 08:33

## 2024-01-10 RX ADMIN — INSULIN LISPRO 2 UNITS: 100 INJECTION, SOLUTION INTRAVENOUS; SUBCUTANEOUS at 22:04

## 2024-01-10 ASSESSMENT — PAIN - FUNCTIONAL ASSESSMENT
PAIN_FUNCTIONAL_ASSESSMENT: 0-10

## 2024-01-10 ASSESSMENT — COGNITIVE AND FUNCTIONAL STATUS - GENERAL
HELP NEEDED FOR BATHING: A LOT
TOILETING: TOTAL
PERSONAL GROOMING: A LOT
DRESSING REGULAR LOWER BODY CLOTHING: A LOT
MOBILITY SCORE: 6
WALKING IN HOSPITAL ROOM: TOTAL
EATING MEALS: A LOT
MOVING TO AND FROM BED TO CHAIR: TOTAL
DRESSING REGULAR UPPER BODY CLOTHING: A LOT
CLIMB 3 TO 5 STEPS WITH RAILING: TOTAL
TURNING FROM BACK TO SIDE WHILE IN FLAT BAD: TOTAL
MOVING FROM LYING ON BACK TO SITTING ON SIDE OF FLAT BED WITH BEDRAILS: TOTAL
STANDING UP FROM CHAIR USING ARMS: TOTAL
DAILY ACTIVITIY SCORE: 11

## 2024-01-10 ASSESSMENT — PAIN SCALES - GENERAL
PAINLEVEL_OUTOF10: 3
PAINLEVEL_OUTOF10: 6

## 2024-01-10 ASSESSMENT — ACTIVITIES OF DAILY LIVING (ADL): BATHING_ASSISTANCE: MAXIMAL

## 2024-01-10 NOTE — PROGRESS NOTES
1/10 Precert pending at this time per discharge support.  Met with the patient and her daughter in the room, patient gave permission to speak  with her daughter present. Per her daughter, the patient was at [facility] for skilled therapy. Per [facility], will need precert to return. Noted that patient is ordered PT and OT to evaluate, requested discharge support to submit for precert when therapy evaluations in .

## 2024-01-10 NOTE — PROGRESS NOTES
Occupational Therapy    Occupational Therapy    Evaluation    Patient Name: Toshia Méndez  MRN: [id number]  Today's Date: 1/10/2024  Time Calculation  Start Time: 1103  Stop Time: 1124  Time Calculation (min): 21 min  Rm 3103    Assessment  IP OT Assessment  End of Session Patient Position: Bed, 3 rail up, Alarm on    Plan:  Treatment Interventions: ADL retraining, Functional transfer training, UE strengthening/ROM, Endurance training, Neuromuscular reeducation  OT Frequency: 3 times per week  OT Discharge Recommendations: Moderate intensity level of continued care (SNF)  OT Recommended Transfer Status: Maximum assist, Assist of 2  OT - OK to Discharge: Yes (to next level of care)    Subjective     Current Problem:  1. Other closed nondisplaced fracture of proximal end of left humerus, initial encounter        2. Fall, initial encounter        3. Closed fracture of proximal end of left humerus with malunion, unspecified fracture morphology, subsequent encounter  oxyCODONE (Roxicodone) 5 mg immediate release tablet      4. Closed displaced comminuted supracondylar fracture of left humerus without intercondylar fracture with routine healing  oxyCODONE (Roxicodone) 5 mg immediate release tablet          General:  Per EMR: difficulty walking s/p fall.  Pt admitted 1/8/2024.  Per EMR: Toshia Méndez is a 89 y.o. female presenting with fall from nursing facility with resultant right hip and left shoulder pain.  Patient has been previously seen in consultation by Dr. Andrade with a displaced left proximal humerus fracture.  Decision at that time was to proceed with nonoperative management involving the left proximal humerus fracture.  Patient noted increased pain status post fall.  Patient fairly drowsy and somewhat confused on my examination but is able to follow commands and is able to answer questions.  Localizes pain to left proximal shoulder girdle.    Assessment/Plan   Left proximal humerus fracture     Patient  "with a stable left proximal humerus fracture.  No indication that this is change in regards to the fall and subsequent \"reinjury\".  From my standpoint patient may continue her nonoperative treatment course which would involve continued passive range of motion with no lifting pulling or pushing the left upper extremity greater than 5 pounds.  A follow-up on an outpatient basis with Dr. Andrade who continues to treat her for this condition.     Jayjay Cole MD     Referred By: Dr. Randolph  Co-Treatment: PT  Prior to Session Communication: Bedside nurse  Patient Position Received: Bed, 3 rail up    Precautions:  Hearing/Visual Limitations: blind L eye  Medical Precautions: Fall precautions  Precautions Comment:  (passive range of motion with no lifting pulling or pushing the left upper extremity greater than 5 pounds.)    Pain:  Pain Assessment  Pain Assessment: 0-10  Pain Score:  (Pt was not able to rate pain)  Pain Location: Shoulder  Pain Orientation: Left    Objective     Cognition:  Overall Cognitive Status: Within Functional Limits    Home Living:  Pt was admitted from [facility].  Pt does not recall being at SNF and unable to give PLOF at SNF.  Per EMR prior to SNF pt lived alone in split level home with 1 JERZY to kitchen level.  No half bath on main level. Bedroom and full bath upstairs, but pt has stair left.  Pt does not use lower level. Has WIS with chair and Gbs.  Mod I with mobility and ADLs using FWW. Granddaughter does cooking, cleaning, and provide transportation.       ADL:  Eating Assistance: Moderate  Grooming Assistance: Maximal  Bathing Assistance: Maximal  UE Dressing Assistance: Maximal  LE Dressing Assistance: Maximal  Toileting Assistance with Device: Maximal  ADL Comments: Pt with limited AROM L UE secondary to pain and difficulty with all ADL's    Activity Tolerance:  Endurance: Decreased tolerance for upright activites    Bed Mobility/Transfers:   Bed Mobility  Bed Mobility: "  (supine <>sit max assist of 2, scooting dependent x2)  Transfers  Transfer:  (sit<>stand max assist x2.)    Sitting Balance:  Static Sitting Balance  Static Sitting-Comment/Number of Minutes: fair+  Dynamic Sitting Balance  Dynamic Sitting-Comments: fair    Standing Balance:  Static Standing Balance  Static Standing-Comment/Number of Minutes: fair-  Dynamic Standing Balance  Dynamic Standing-Comments: poor    Sensation:  Sensation Comment: Pt reports tingling on L UE.    Strength:  Not formally tested    Extremities: RUE   RUE : Within Functional Limits and LUE   LUE: Exceptions to WFL (Pt with race AROM L shoulder (all planes); pt with trace L elbow flexion)    Outcome Measures: [facility] Daily Activity  Putting on and taking off regular lower body clothing: A lot  Bathing (including washing, rinsing, drying): A lot  Putting on and taking off regular upper body clothing: A lot  Toileting, which includes using toilet, bedpan or urinal: Total  Taking care of personal grooming such as brushing teeth: A lot  Eating Meals: A lot  Daily Activity - Total Score: 11    EDUCATION:  Education  Individual(s) Educated: Patient (safety)    Goals:   Encounter Problems       Encounter Problems (Active)       OT Goals       OT Goal 1       Start:  01/10/24    Expected End:  01/24/24       Pt will complete all bed mobility with Min A of 1-2 safely            OT Goal 2       Start:  01/10/24    Expected End:  01/24/24       Pt will complete ADL's and mobility with good sit balance and fair stand balance            OT Goal 3       Start:  01/10/24    Expected End:  01/24/24       Pt with complete all transfers safely with Mod A of 1-2            OT Goal 4       Start:  01/10/24    Expected End:  01/24/24       Pt will complete UB dressing ADL's with Min A using adaptive device(s) as needed           OT Goal 5       Start:  01/10/24    Expected End:  01/24/24       Pt with complete grooming ADL's with Min A using DME as needed                  Treatment: Pt required Max A of 2 to complete supine-sit and to scoot hips to EOB. Pt with fair sit balance on EOB. Pt completed all transfers with Max A of 2. Pt was not devang to side step or ambulate at this time. Pt did complete 2 transfers to change pad on bed. Pt remained in bed, and required Max A of 2 to complete sit-supine. Pt with call light within reach.

## 2024-01-10 NOTE — PROGRESS NOTES
Physical Therapy    Physical Therapy    Physical Therapy Evaluation & Treatment    Patient Name: Toshia Méndez  MRN: [id number]  Today's Date: 1/10/2024   Time Calculation  Start Time: 1102  Stop Time: 1124  Time Calculation (min): 22 min    Assessment/Plan   PT Assessment  PT Assessment Results: Decreased strength, Decreased endurance, Impaired balance, Decreased mobility, Decreased safety awareness, Impaired vision, Pain  Rehab Prognosis: Fair  End of Session Patient Position: Bed, 3 rail up, Alarm on  IP OR SWING BED PT PLAN  Inpatient or Swing Bed: Inpatient  PT Plan  Treatment/Interventions: Bed mobility, Transfer training, Gait training, Balance training, Strengthening, Endurance training, Therapeutic exercise, Therapeutic activity (Pt education)  PT Plan: Skilled PT  PT Frequency: 3 times per week  PT Discharge Recommendations: Moderate intensity level of continued care  PT Recommended Transfer Status: Assist x2  PT - OK to Discharge:  OK to discharge from acute PT services to the next level of care when cleared by the medical team.    Current Problem:  Patient Active Problem List   Diagnosis    Stage 3 chronic kidney disease, unspecified whether stage 3a or 3b CKD (CMS/HCC)    Anxiety    Arthritis    Atherosclerosis of coronary artery    AV block    CHB (complete heart block) (CMS/HCC)    Depression with anxiety    Diabetes mellitus, type II (CMS/HCC)    Diabetic neuropathy (CMS/HCC)    Gout    Diabetes mellitus with coincident hypertension (CMS/HCC)    Hypothyroidism    Spinal stenosis, lumbar region, with neurogenic claudication    Multinodular goiter    Pacemaker    Diabetes mellitus type 2 with neurological manifestations (CMS/HCC)    DM type 2 with diabetic mixed hyperlipidemia (CMS/HCC)    Current mild episode of major depressive disorder (CMS/HCC)    Old cerebrovascular accident (CVA) without late effect    Radiculopathy    Closed displaced comminuted supracondylar fracture of left humerus without  intercondylar fracture with routine healing    Closed fracture of proximal end of left humerus with malunion, unspecified fracture morphology, subsequent encounter    Impaired functional mobility, balance, gait, and endurance    Pain    Other closed nondisplaced fracture of proximal end of left humerus, initial encounter       Subjective     General Visit Information:  General  Reason for Referral: difficulty walking s/p fall.  Pt admitted 1/8/2024.  Per EMR: Toshia Méndez is a 89 y.o. female presenting with fall from nursing facility with resultant right hip and left shoulder pain.  Patient has been previously seen in consultation by Dr. Andrade with a displaced left proximal humerus fracture.  Decision at that time was to proceed with nonoperative management involving the left proximal humerus fracture.  Patient noted increased pain status post fall.  Patient fairly drowsy and somewhat confused on my examination but is able to follow commands and is able to answer questions.  Localizes pain to left proximal shoulder girdle.  Referred By: Dr. Randolph  Co-Treatment: OT  Co-Treatment Reason: Co-treatment to maximize functional independence and for safety.  Prior to Session Communication: Bedside nurse  Patient Position Received: Bed, 3 rail up  General Comment: Pt reports she is blind in her L eye.    Home Living:  Home Living  Home Living Comments: Pt was admitted from [facility].  Pt does not recall being at SNF and unable to give PLOF at SNF.  Per EMR prior to SNF pt lived alone in split level home with 1 JERZY to kitchen level.  No half bath on main level.  Bedroom and full bath upstairs, but pt has stair left.  Pt does not use lower level.  Has WIS with chair and Gbs.  Mod I with mobility and ADLs using FWW.  Granddaughter does cooking, cleaning, and provide transportation.    Prior Level of Function: per above       Precautions:  Precautions  Medical Precautions: Fall precautions  Precautions  Comment:  (passive range of motion with no lifting pulling or pushing the left upper extremity greater than 5 pounds.)    Vital Signs:     Objective     Pain:  Pain Assessment  Pain Assessment: 0-10  Pain Score: 6  Pain Location: Shoulder  Pain Orientation: Left         General Assessments:      Activity Tolerance  Endurance: Decreased tolerance for upright activites  Sensation  Sensation Comment: Pt reports tingling on L UE.  Strength  Strength Comments: B LE's grossly 3+/5           Static Sitting Balance  Static Sitting-Level of Assistance: Close supervision       Functional Assessments:     Bed Mobility  Bed Mobility:  (supine <>sit max assist of 2, scooting dependent x2)  Transfers  Transfer:  (sit<>stand max assist x2.)  Ambulation/Gait Training  Ambulation/Gait Training Performed:  (Pt unable to take steps, pt stood with max assist of 2, unsteady.)          Extremity/Trunk Assessments:        RLE   RLE :  (R LE ROM WFL)  LLE   LLE :  (L LE ROM WFL)      Outcome Measures:  [facility] Basic Mobility  Turning from your back to your side while in a flat bed without using bedrails: Total  Moving from lying on your back to sitting on the side of a flat bed without using bedrails: Total  Moving to and from bed to chair (including a wheelchair): Total  Standing up from a chair using your arms (e.g. wheelchair or bedside chair): Total  To walk in hospital room: Total  Climbing 3-5 steps with railing: Total  Basic Mobility - Total Score: 6                            Goals:  Encounter Problems       Encounter Problems (Active)       PT Problem       PT Goal 1       Start:  01/10/24    Expected End:  01/24/24       Pt able to perform bed mobility with mod assist.           PT Goal 2       Start:  01/10/24    Expected End:  01/24/24       Pt able to complete all transfers with mod assist.            PT Goal 3       Start:  01/10/24    Expected End:  01/24/24       Pt able to ambulate 25 feet with quad cane vs standard cane and  mod assist.                        Education Documentation  Precautions, taught by Selene Wilson, PT at 1/10/2024 12:10 PM.  Learner: Patient  Readiness: Acceptance  Method: Explanation  Response: Needs Reinforcement    Mobility Training, taught by Selene Wilson, PT at 1/10/2024 12:10 PM.  Learner: Patient  Readiness: Acceptance  Method: Explanation  Response: Needs Reinforcement    Education Comments  No comments found.

## 2024-01-10 NOTE — DISCHARGE INSTRUCTIONS
may continue her nonoperative treatment course which would involve continued passive range of motion with no lifting pulling or pushing the left upper extremity greater than 5 pounds.  A follow-up on an outpatient basis with Dr. Andrade who continues to treat her for this condition.     PCP at nursing home to follow up on urine culture results from the UA that was sent on 1/11/24

## 2024-01-11 ENCOUNTER — APPOINTMENT (OUTPATIENT)
Dept: RADIOLOGY | Facility: HOSPITAL | Age: 89
End: 2024-01-11
Payer: MEDICARE

## 2024-01-11 LAB
ALBUMIN SERPL BCP-MCNC: 3.3 G/DL (ref 3.4–5)
ALP SERPL-CCNC: 206 U/L (ref 33–136)
ALT SERPL W P-5'-P-CCNC: 21 U/L (ref 7–45)
ANION GAP SERPL CALC-SCNC: 15 MMOL/L (ref 10–20)
APPEARANCE UR: ABNORMAL
AST SERPL W P-5'-P-CCNC: 18 U/L (ref 9–39)
BACTERIA #/AREA URNS AUTO: ABNORMAL /HPF
BILIRUB DIRECT SERPL-MCNC: 0.2 MG/DL (ref 0–0.3)
BILIRUB SERPL-MCNC: 0.8 MG/DL (ref 0–1.2)
BILIRUB UR STRIP.AUTO-MCNC: NEGATIVE MG/DL
BUN SERPL-MCNC: 22 MG/DL (ref 6–23)
CALCIUM SERPL-MCNC: 9.3 MG/DL (ref 8.6–10.3)
CHLORIDE SERPL-SCNC: 98 MMOL/L (ref 98–107)
CO2 SERPL-SCNC: 24 MMOL/L (ref 21–32)
COLOR UR: YELLOW
CREAT SERPL-MCNC: 0.71 MG/DL (ref 0.5–1.05)
EGFRCR SERPLBLD CKD-EPI 2021: 79 ML/MIN/1.73M*2
ERYTHROCYTE [DISTWIDTH] IN BLOOD BY AUTOMATED COUNT: 16.7 % (ref 11.5–14.5)
GLUCOSE BLD MANUAL STRIP-MCNC: 205 MG/DL (ref 74–99)
GLUCOSE BLD MANUAL STRIP-MCNC: 208 MG/DL (ref 74–99)
GLUCOSE BLD MANUAL STRIP-MCNC: 214 MG/DL (ref 74–99)
GLUCOSE BLD MANUAL STRIP-MCNC: 283 MG/DL (ref 74–99)
GLUCOSE SERPL-MCNC: 211 MG/DL (ref 74–99)
GLUCOSE UR STRIP.AUTO-MCNC: ABNORMAL MG/DL
HCT VFR BLD AUTO: 39.4 % (ref 36–46)
HGB BLD-MCNC: 12.7 G/DL (ref 12–16)
HOLD SPECIMEN: NORMAL
KETONES UR STRIP.AUTO-MCNC: ABNORMAL MG/DL
LEUKOCYTE ESTERASE UR QL STRIP.AUTO: ABNORMAL
MAGNESIUM SERPL-MCNC: 1.8 MG/DL (ref 1.6–2.4)
MCH RBC QN AUTO: 29.4 PG (ref 26–34)
MCHC RBC AUTO-ENTMCNC: 32.2 G/DL (ref 32–36)
MCV RBC AUTO: 91 FL (ref 80–100)
NITRITE UR QL STRIP.AUTO: POSITIVE
NRBC BLD-RTO: 0 /100 WBCS (ref 0–0)
PH UR STRIP.AUTO: 5 [PH]
PHOSPHATE SERPL-MCNC: 4.1 MG/DL (ref 2.5–4.9)
PLATELET # BLD AUTO: 277 X10*3/UL (ref 150–450)
POTASSIUM SERPL-SCNC: 4.3 MMOL/L (ref 3.5–5.3)
PROT SERPL-MCNC: 7.2 G/DL (ref 6.4–8.2)
PROT UR STRIP.AUTO-MCNC: NEGATIVE MG/DL
RBC # BLD AUTO: 4.32 X10*6/UL (ref 4–5.2)
RBC # UR STRIP.AUTO: ABNORMAL /UL
RBC #/AREA URNS AUTO: ABNORMAL /HPF
SODIUM SERPL-SCNC: 133 MMOL/L (ref 136–145)
SP GR UR STRIP.AUTO: 1.02
SQUAMOUS #/AREA URNS AUTO: ABNORMAL /HPF
UROBILINOGEN UR STRIP.AUTO-MCNC: <2 MG/DL
WBC # BLD AUTO: 13 X10*3/UL (ref 4.4–11.3)
WBC #/AREA URNS AUTO: >50 /HPF
YEAST BUDDING #/AREA UR COMP ASSIST: PRESENT /HPF

## 2024-01-11 PROCEDURE — 80048 BASIC METABOLIC PNL TOTAL CA: CPT | Performed by: PHYSICIAN ASSISTANT

## 2024-01-11 PROCEDURE — 82947 ASSAY GLUCOSE BLOOD QUANT: CPT | Mod: 59

## 2024-01-11 PROCEDURE — 99222 1ST HOSP IP/OBS MODERATE 55: CPT | Performed by: STUDENT IN AN ORGANIZED HEALTH CARE EDUCATION/TRAINING PROGRAM

## 2024-01-11 PROCEDURE — 84075 ASSAY ALKALINE PHOSPHATASE: CPT | Performed by: PHYSICIAN ASSISTANT

## 2024-01-11 PROCEDURE — 96375 TX/PRO/DX INJ NEW DRUG ADDON: CPT | Performed by: EMERGENCY MEDICINE

## 2024-01-11 PROCEDURE — 87086 URINE CULTURE/COLONY COUNT: CPT | Mod: STJLAB | Performed by: NURSE PRACTITIONER

## 2024-01-11 PROCEDURE — 2500000004 HC RX 250 GENERAL PHARMACY W/ HCPCS (ALT 636 FOR OP/ED): Performed by: PHYSICIAN ASSISTANT

## 2024-01-11 PROCEDURE — 81001 URINALYSIS AUTO W/SCOPE: CPT | Performed by: NURSE PRACTITIONER

## 2024-01-11 PROCEDURE — 2500000001 HC RX 250 WO HCPCS SELF ADMINISTERED DRUGS (ALT 637 FOR MEDICARE OP): Performed by: NURSE PRACTITIONER

## 2024-01-11 PROCEDURE — 2500000001 HC RX 250 WO HCPCS SELF ADMINISTERED DRUGS (ALT 637 FOR MEDICARE OP): Performed by: PHYSICIAN ASSISTANT

## 2024-01-11 PROCEDURE — 2500000005 HC RX 250 GENERAL PHARMACY W/O HCPCS: Performed by: PHYSICIAN ASSISTANT

## 2024-01-11 PROCEDURE — G0378 HOSPITAL OBSERVATION PER HR: HCPCS

## 2024-01-11 PROCEDURE — 2500000002 HC RX 250 W HCPCS SELF ADMINISTERED DRUGS (ALT 637 FOR MEDICARE OP, ALT 636 FOR OP/ED): Performed by: PHYSICIAN ASSISTANT

## 2024-01-11 PROCEDURE — 84100 ASSAY OF PHOSPHORUS: CPT | Performed by: PHYSICIAN ASSISTANT

## 2024-01-11 PROCEDURE — 85027 COMPLETE CBC AUTOMATED: CPT | Performed by: PHYSICIAN ASSISTANT

## 2024-01-11 PROCEDURE — 36415 COLL VENOUS BLD VENIPUNCTURE: CPT | Performed by: PHYSICIAN ASSISTANT

## 2024-01-11 PROCEDURE — 83735 ASSAY OF MAGNESIUM: CPT | Performed by: PHYSICIAN ASSISTANT

## 2024-01-11 RX ORDER — CEFTRIAXONE 1 G/50ML
1 INJECTION, SOLUTION INTRAVENOUS EVERY 24 HOURS
Status: DISCONTINUED | OUTPATIENT
Start: 2024-01-11 | End: 2024-01-11

## 2024-01-11 RX ORDER — FLUCONAZOLE 100 MG/1
100 TABLET ORAL DAILY
Status: DISCONTINUED | OUTPATIENT
Start: 2024-01-11 | End: 2024-01-12 | Stop reason: HOSPADM

## 2024-01-11 RX ORDER — GRANULES FOR ORAL 3 G/1
3 POWDER ORAL ONCE
Status: COMPLETED | OUTPATIENT
Start: 2024-01-11 | End: 2024-01-11

## 2024-01-11 RX ORDER — FLUCONAZOLE 100 MG/1
100 TABLET ORAL DAILY
Qty: 3 TABLET | Refills: 0 | Status: SHIPPED | OUTPATIENT
Start: 2024-01-11 | End: 2024-01-14

## 2024-01-11 RX ADMIN — INSULIN LISPRO 4 UNITS: 100 INJECTION, SOLUTION INTRAVENOUS; SUBCUTANEOUS at 21:40

## 2024-01-11 RX ADMIN — OXYCODONE HYDROCHLORIDE AND ACETAMINOPHEN 500 MG: 500 TABLET ORAL at 08:52

## 2024-01-11 RX ADMIN — LOSARTAN POTASSIUM 100 MG: 100 TABLET, FILM COATED ORAL at 08:51

## 2024-01-11 RX ADMIN — TRIFLUOPERAZINE HYDROCHLORIDE 1 MG: 1 TABLET, FILM COATED ORAL at 21:39

## 2024-01-11 RX ADMIN — METOPROLOL TARTRATE 75 MG: 50 TABLET, FILM COATED ORAL at 21:38

## 2024-01-11 RX ADMIN — ENOXAPARIN SODIUM 40 MG: 40 INJECTION SUBCUTANEOUS at 13:24

## 2024-01-11 RX ADMIN — INSULIN LISPRO 4 UNITS: 100 INJECTION, SOLUTION INTRAVENOUS; SUBCUTANEOUS at 18:08

## 2024-01-11 RX ADMIN — LIDOCAINE 1 PATCH: 4 PATCH TOPICAL at 08:50

## 2024-01-11 RX ADMIN — ASPIRIN 81 MG: 81 TABLET, COATED ORAL at 08:52

## 2024-01-11 RX ADMIN — ALPRAZOLAM 0.12 MG: 0.25 TABLET ORAL at 08:50

## 2024-01-11 RX ADMIN — FUROSEMIDE 20 MG: 20 TABLET ORAL at 00:12

## 2024-01-11 RX ADMIN — ALPRAZOLAM 0.12 MG: 0.25 TABLET ORAL at 21:38

## 2024-01-11 RX ADMIN — INSULIN LISPRO 4 UNITS: 100 INJECTION, SOLUTION INTRAVENOUS; SUBCUTANEOUS at 08:48

## 2024-01-11 RX ADMIN — FOLIC ACID 1 MG: 1 TABLET ORAL at 08:52

## 2024-01-11 RX ADMIN — LEVOTHYROXINE SODIUM 100 MCG: 0.1 TABLET ORAL at 06:15

## 2024-01-11 RX ADMIN — PANTOPRAZOLE SODIUM 20 MG: 20 TABLET, DELAYED RELEASE ORAL at 06:15

## 2024-01-11 RX ADMIN — METOPROLOL TARTRATE 75 MG: 50 TABLET, FILM COATED ORAL at 08:50

## 2024-01-11 RX ADMIN — EMPAGLIFLOZIN 10 MG: 10 TABLET, FILM COATED ORAL at 08:51

## 2024-01-11 RX ADMIN — GRANULES FOR ORAL SOLUTION 3 G: 3 POWDER ORAL at 13:22

## 2024-01-11 RX ADMIN — Medication 1 TABLET: at 08:50

## 2024-01-11 RX ADMIN — FLUCONAZOLE 100 MG: 100 TABLET ORAL at 13:23

## 2024-01-11 RX ADMIN — AMLODIPINE BESYLATE 10 MG: 10 TABLET ORAL at 08:52

## 2024-01-11 RX ADMIN — CHLORPROMAZINE HYDROCHLORIDE 10 MG: 10 TABLET, FILM COATED ORAL at 21:37

## 2024-01-11 RX ADMIN — INSULIN LISPRO 6 UNITS: 100 INJECTION, SOLUTION INTRAVENOUS; SUBCUTANEOUS at 13:31

## 2024-01-11 ASSESSMENT — PAIN SCALES - GENERAL
PAINLEVEL_OUTOF10: 0 - NO PAIN
PAINLEVEL_OUTOF10: 0 - NO PAIN

## 2024-01-11 ASSESSMENT — PAIN - FUNCTIONAL ASSESSMENT
PAIN_FUNCTIONAL_ASSESSMENT: 0-10
PAIN_FUNCTIONAL_ASSESSMENT: 0-10

## 2024-01-11 NOTE — CARE PLAN
Problem: Pain  Goal: My pain/discomfort is manageable  Outcome: Progressing     Problem: Safety  Goal: Patient will be injury free during hospitalization  Outcome: Progressing     Problem: Psychosocial Needs  Goal: Demonstrates ability to cope with hospitalization/illness  Outcome: Progressing  Goal: Collaborate with me, my family, and caregiver to identify my specific goals  Outcome: Progressing

## 2024-01-11 NOTE — PROGRESS NOTES
Received auth--dc order is in. Patient's daughter requesting an update from --message sent to attending and Np to give her a call.   1130 Dr Randolph to call patient's daughter later--he is aware that goldform needs signed prior to dc. Called and let patient's daughter know.  1345 Requested transport to [facility].   1345 Transport set for 1510--left message for patient's daughter. Facility also notified  1500 Spoke with Dr Randolph--New neuro consult placed and dc being held. Facility notified and transport cancelled.   Faye Skelton, RN

## 2024-01-11 NOTE — CONSULTS
Inpatient consult to Neurology  Consult performed by: Brown Cuenca DO  Consult ordered by: Oscar Randolph MD  Reason for consult: encephalopathy, family request      History Of Present Illness  Toshia Méndez is a 89 y.o. female initially presenting on 1/8/2023 due to mechanical fall.  Neurology was consulted today due to concerns for new mental status change and family request.  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  Hospital course thus far with imaging demonstrating left proximal humerus fracture, for which was evaluated by orthopedic surgery and to be managed conservatively.  Patient was to be discharged today to SNF, however there were concerns for altered mentation, thus neurology was consulted.    Upon evaluation in the afternoon, the patient is alert and oriented x 4.  She is a known patient of Dr. Condon.  She has no new deficits other than her chronic facial droop, left upper and left lower extremity weakness.  She does endorse dysuria which started about 2 days ago.  Otherwise, complains of left shoulder pain and ongoing fatigue.     Past Medical History  Past Medical History:   Diagnosis Date    Abnormal weight loss     Weight loss    Anxiety disorder, unspecified     Anxiety    Cardiomegaly     LVH (left ventricular hypertrophy)    Difficulty in walking, not elsewhere classified     Difficulty walking    Disorder of arteries and arterioles, unspecified (CMS/HCC)     Carotid artery disease    Essential (primary) hypertension 11/07/2013    Benign essential hypertension    Hyperlipidemia, unspecified 11/07/2013    Hyperlipidemia    Other symptoms and signs involving the musculoskeletal system     Limb weakness    Personal history of other diseases of the circulatory system     Personal history of coronary atherosclerosis    Personal history of other diseases of the digestive  system     History of gastroesophageal reflux (GERD)    Personal history of other diseases of the nervous system and sense organs     History of diplopia    Personal history of other diseases of the respiratory system     History of chronic bronchitis    Personal history of other endocrine, nutritional and metabolic disease     History of diabetes mellitus    Personal history of other mental and behavioral disorders     History of depression    Personal history of other specified conditions     History of diarrhea    Personal history of other venous thrombosis and embolism     History of deep venous thrombosis    Stress incontinence (female) (male)     Stress incontinence       Surgical History  Past Surgical History:   Procedure Laterality Date    CARDIAC SURGERY  11/19/2014    Heart Surgery    CHOLECYSTECTOMY  11/19/2014    Cholecystectomy    COLONOSCOPY  11/19/2014    Colonoscopy (Fiberoptic)    CORONARY ARTERY BYPASS GRAFT  02/11/2014    CABG    DILATION AND CURETTAGE OF UTERUS  02/11/2014    Dilation And Curettage    LUMBAR LAMINECTOMY  03/04/2015    Laminectomy Lumbar    MR HEAD ANGIO WO IV CONTRAST  2/18/2020    MR HEAD ANGIO WO IV CONTRAST 2/18/2020 [facility]ICAL [facility]GAC[facility]    MR NECK ANGIO WO IV CONTRAST  2/18/2020    MR NECK ANGIO WO IV CONTRAST 2/18/2020 [facility] CLINICAL LEGACY    OTHER SURGICAL HISTORY  10/26/2021    Bronchoscopy    OTHER SURGICAL HISTORY  10/26/2021    Arterial stent placement    OTHER SURGICAL HISTORY  10/26/2021    Back surgery    OTHER SURGICAL HISTORY  10/26/2021    Cataract surgery    OTHER SURGICAL HISTORY  10/26/2021    Coronary artery bypass graft    OTHER SURGICAL HISTORY  10/26/2021    Pacemaker insertion    OTHER SURGICAL HISTORY  10/26/2021    Esophagogastroduodenoscopy    OTHER SURGICAL HISTORY  02/11/2014    Previous Stent Placement    TONSILLECTOMY  11/19/2014    Tonsillectomy         Social History   reports that she has never smoked. She has never used smokeless tobacco. She  reports that she does not currently use alcohol. She reports that she does not currently use drugs.    Family History  No family history on file.     Allergies  Iodinated contrast media, Iodine, Sulfa (sulfonamide antibiotics), and Sulfamethoxazole-trimethoprim    Review of Systems  Review of Systems (bold = positive):     Constitutional: Fever, Chills  Eyes: Blurry Vision, Vision Loss/ Change  ENMT: Nasal Discharge, Nasal Congestion  Respiratory: Dry Cough, Productive Cough, Wheezing, Shortness of Breath  Cardiac: Chest Pain, Syncope, Palpitations  Gastrointestinal: Nausea, Vomiting, Diarrhea, Constipation, Abdominal Pain  Genitourinary: Discharge, Dysuria, Flank Pain  Musculoskeletal: Pain, Swelling, Weakness  Neurological:  Dizziness, Confusion, Headache, Syncope  Skin: Pain, Rash, Ulcer  Endocrine: Sweat, Polyuria  Hematologic/Lymph:  Night Sweats, Petechiae  Allergic/Immunologic: Anaphylaxis    Physical Exam  Constitutional: Elderly, resting comfortably, A&Ox4, follows simple commands  Skin: Warm and dry, no lesions, no rashes  Eyes: Anicteric, clear sclera  ENMT: mucous membranes moist, no apparent injury, no lesions seen  Head/Neck: Atraumatic  Respiratory: Lungs clear to auscultation bilaterally with no wheezes, rhonci or crackles  CV: Regular rate and rhythm, no murmurs or gallops auscultated  GI: Non-tender to deep palpation, no guarding  MSK: Left shoulder injury  Extremities: normal extremities, no cyanosis edema, contusions or wounds, no clubbing  Psych: Appropriate mood and behavior    1a  Level of consciousness: 0=alert; keenly responsive   1b. LOC questions:  0=Performs both tasks correctly   1c. LOC commands: 0=Performs both tasks correctly   2.  Best Gaze: 0=normal   3. Visual: 0=No visual loss   4. Facial Palsy: 1=Minor paralysis (flattened nasolabial fold, asymmetric on smiling)   5a. Motor left arm: 0=No drift, limb holds 90 (or 45) degrees for full 10 seconds   5b.  Motor right arm: 0=No drift,  limb holds 90 (or 45) degrees for full 10 seconds   6a. motor left le=No drift, limb holds 90 (or 45) degrees for full 10 seconds   6b  Motor right le=No drift, limb holds 90 (or 45) degrees for full 10 seconds   7. Limb Ataxia: 0=Absent   8.  Sensory: 0=Normal; no sensory loss   9. Best Language:  0=No aphasia, normal   10. Dysarthria: 0=Normal   11. Extinction and Inattention: 0=No abnormality          Total:   1        Last Recorded Vitals  /56 (BP Location: Right arm, Patient Position: Lying)   Pulse 82   Temp 36.9 °C (98.4 °F) (Temporal)   Resp 19   Wt 62.1 kg (136 lb 14.5 oz)   SpO2 94%     Results  Results for orders placed or performed during the hospital encounter of 24 (from the past 24 hour(s))   POCT GLUCOSE   Result Value Ref Range    POCT Glucose 175 (H) 74 - 99 mg/dL   CBC   Result Value Ref Range    WBC 13.0 (H) 4.4 - 11.3 x10*3/uL    nRBC 0.0 0.0 - 0.0 /100 WBCs    RBC 4.32 4.00 - 5.20 x10*6/uL    Hemoglobin 12.7 12.0 - 16.0 g/dL    Hematocrit 39.4 36.0 - 46.0 %    MCV 91 80 - 100 fL    MCH 29.4 26.0 - 34.0 pg    MCHC 32.2 32.0 - 36.0 g/dL    RDW 16.7 (H) 11.5 - 14.5 %    Platelets 277 150 - 450 x10*3/uL   Hepatic function panel   Result Value Ref Range    Albumin 3.3 (L) 3.4 - 5.0 g/dL    Bilirubin, Total 0.8 0.0 - 1.2 mg/dL    Bilirubin, Direct 0.2 0.0 - 0.3 mg/dL    Alkaline Phosphatase 206 (H) 33 - 136 U/L    ALT 21 7 - 45 U/L    AST 18 9 - 39 U/L    Total Protein 7.2 6.4 - 8.2 g/dL   Magnesium   Result Value Ref Range    Magnesium 1.80 1.60 - 2.40 mg/dL   Phosphorus   Result Value Ref Range    Phosphorus 4.1 2.5 - 4.9 mg/dL   Basic Metabolic Panel   Result Value Ref Range    Glucose 211 (H) 74 - 99 mg/dL    Sodium 133 (L) 136 - 145 mmol/L    Potassium 4.3 3.5 - 5.3 mmol/L    Chloride 98 98 - 107 mmol/L    Bicarbonate 24 21 - 32 mmol/L    Anion Gap 15 10 - 20 mmol/L    Urea Nitrogen 22 6 - 23 mg/dL    Creatinine 0.71 0.50 - 1.05 mg/dL    eGFR 79 >60 mL/min/1.73m*2     Calcium 9.3 8.6 - 10.3 mg/dL   Urinalysis with Reflex Culture and Microscopic   Result Value Ref Range    Color, Urine Yellow Straw, Yellow    Appearance, Urine Hazy (N) Clear    Specific Gravity, Urine 1.016 1.005 - 1.035    pH, Urine 5.0 5.0, 5.5, 6.0, 6.5, 7.0, 7.5, 8.0    Protein, Urine NEGATIVE NEGATIVE mg/dL    Glucose, Urine >=500 (3+) (A) NEGATIVE mg/dL    Blood, Urine MODERATE (2+) (A) NEGATIVE    Ketones, Urine 20 (1+) (A) NEGATIVE mg/dL    Bilirubin, Urine NEGATIVE NEGATIVE    Urobilinogen, Urine <2.0 <2.0 mg/dL    Nitrite, Urine POSITIVE (A) NEGATIVE    Leukocyte Esterase, Urine LARGE (3+) (A) NEGATIVE   Microscopic Only, Urine   Result Value Ref Range    WBC, Urine >50 (A) 1-5, NONE /HPF    RBC, Urine 11-20 (A) NONE, 1-2, 3-5 /HPF    Squamous Epithelial Cells, Urine 1-9 (SPARSE) Reference range not established. /HPF    Bacteria, Urine 4+ (A) NONE SEEN /HPF    Budding Yeast, Urine PRESENT (A) NONE /HPF   POCT GLUCOSE   Result Value Ref Range    POCT Glucose 208 (H) 74 - 99 mg/dL   POCT GLUCOSE   Result Value Ref Range    POCT Glucose 283 (H) 74 - 99 mg/dL   POCT GLUCOSE   Result Value Ref Range    POCT Glucose 214 (H) 74 - 99 mg/dL       Imaging  US right upper quadrant  Narrative: Interpreted By:  Mauro Carpenter,   STUDY:  US RIGHT UPPER QUADRANT;  1/9/2024 5:38 pm      INDICATION:  Signs/Symptoms:Elevated alkaline phosphatase level, elevated GGT.      COMPARISON:  None.      ACCESSION NUMBER(S):  [id number]      ORDERING CLINICIAN:  KARLI MALIN      TECHNIQUE:  Multiple images of the right upper quadrant were obtained.      FINDINGS:  LIVER:  Normal size with its right lobe measuring at least 13.8 cm in length.  Normal homogeneous echotexture.      GALLBLADDER:  Status post cholecystectomy by history.      BILIARY TREE:  No intra or extrahepatic biliary dilatation is identified. The common  bile duct measures 6.5 mm in diameter.      PANCREAS:  Normal body contour and echotexture. The head  and tail are largely  obscured by gas. Borderline caliber to minimally dilated pancreatic  duct.      RIGHT KIDNEY:  The right kidney is normal in size, measuring 10.6 cm in craniocaudal  dimension. The renal cortical echogenicity and thickness are within  normal limits. No hydronephrosis or renal mass is seen.      Impression: Essentially normal study.      MACRO:  None      Signed by: Mauro Carpenter 1/9/2024 6:14 PM  Dictation workstation:   [id number]       Assessment/Plan     89-year-old female initially presenting for mechanical fall, with neurology consulted today due to concerns for acute mental status change.  Significant history of prior CVA s/p TNK with hemorrhagic conversion with chronic left upper and lower extremity weakness.    Assessment:  # Metabolic encephalopathy secondary to UTI  # History of hemorrhagic CVA with chronic left upper extremity left lower extremity weakness  # Mechanical fall, with left proximal humerus fracture    Plan:  -Low concern for acute neurologic etiology for her encephalopathy.  Likely secondary to her current UTI, for which she received 1 dose of fosfomycin  -Will reexamine tomorrow, but otherwise would not recommend further neurologic workup at this time    To be staffed with attending,  Brown Cuenca, DO  Internal Medicine PGY-3

## 2024-01-11 NOTE — PROGRESS NOTES
MRN: [id number]  SERVICE DATE:  1/11/2024   SERVICE TIME:  8:36 AM  Today's Date: 01/11/24  Admission Date: 1/8/2024  Hospital Day: #0    Subjective      Patient seen today resting comfortably.  Has multiple medical problems including trauma, history of DVT PE.  She is feeling somewhat better today awaiting pre-CERT        Objective      ROS:   General: Denies any change in weight, fever, chills, fatigue.   Head and Neck: Denies any headaches, syncope or history of head injury.   Eyes/Ears: Denies any cataracts, glaucoma, blurred vision, tinnitus.   Nose: Denies any epistaxis or sinus problems   Respiratory: Denies any cough, hemoptysis, wheezing, asthma, dyspnea.   Cardiovascular: No orthopnea, dyspnea, palpitations, congestive heart failure.   Gastrointestinal: Denies any indigestion, nausea, vomiting, diarrhea, constipation.   Neuro: No dizzyness, headache or syncope   ID: No fever or chills.   Endocrine: No weight loss or weight gain.  No thyroid or diabetic complaints     MEDICATIONS:  Current Facility-Administered Medications   Medication Dose Route Frequency Provider Last Rate Last Admin    [Held by provider] acetaminophen (Tylenol) tablet 650 mg  650 mg oral q6h Vidal Andrews PA-C        ALPRAZolam (Xanax) tablet 0.125 mg  0.125 mg oral BID INDIO Schmid-TYLER   0.125 mg at 01/10/24 2204    amLODIPine (Norvasc) tablet 10 mg  10 mg oral Daily INDIO Schmid-C   10 mg at 01/10/24 0834    ascorbic acid (Vitamin C) tablet 500 mg  500 mg oral Daily INDIO Schmid-C   500 mg at 01/10/24 0833    aspirin EC tablet 81 mg  81 mg oral Daily INDIO Schmid-C   81 mg at 01/10/24 0833    [Held by provider] atorvastatin (Lipitor) tablet 80 mg  80 mg oral Daily Vidal Andrews PA-C        bisacodyl (Dulcolax) EC tablet 10 mg  10 mg oral Daily PRN Vidal Andrews PA-C        chlorproMAZINE (Thorazine) tablet 10 mg  10 mg oral Nightly Vidal Andrews PA-C   10 mg at 01/10/24 2204    dextrose 10 %  in water (D10W) infusion  0.3 g/kg/hr intravenous Once PRN Vidal Andrews PA-C        dextrose 50 % injection 25 g  25 g intravenous q15 min PRN Vidal Andrews PA-C        empagliflozin (Jardiance) tablet 10 mg  10 mg oral Daily Vidal Andrews PA-C   10 mg at 01/10/24 0833    enoxaparin (Lovenox) syringe 40 mg  40 mg subcutaneous q24h Vidal Andrews PA-C   40 mg at 01/10/24 1414    folic acid (Folvite) tablet 1 mg  1 mg oral Daily Vidal Andrews PA-C   1 mg at 01/10/24 0833    furosemide (Lasix) tablet 20 mg  20 mg oral Every Mon/Wed/Fri Vidal Andrews PA-C   20 mg at 01/11/24 0012    [Held by provider] glimepiride (Amaryl) tablet 4 mg  4 mg oral BID Vidal Andrews PA-C        glucagon (Glucagen) injection 1 mg  1 mg intramuscular q15 min PRN Vidal Andrews PA-C        insulin lispro (HumaLOG) injection 0-10 Units  0-10 Units subcutaneous Before meals & nightly Vidal Andrews PA-C   2 Units at 01/10/24 2204    ipratropium-albuteroL (Duo-Neb) 0.5-2.5 mg/3 mL nebulizer solution 3 mL  3 mL nebulization PRN Oscar Randolph MD        levothyroxine (Synthroid, Levoxyl) tablet 100 mcg  100 mcg oral Daily before breakfast Vidal Andrews PA-C   100 mcg at 01/11/24 0615    lidocaine 4 % patch 1 patch  1 patch transdermal Daily Vidal Andrews PA-C   1 patch at 01/10/24 0832    losartan (Cozaar) tablet 100 mg  100 mg oral Daily Vidal Andrews PA-C   100 mg at 01/10/24 0834    melatonin tablet 3 mg  3 mg oral Daily Vidal Andrews PA-C   3 mg at 01/10/24 1837    metoprolol tartrate (Lopressor) tablet 75 mg  75 mg oral BID Vidal Andrews PA-C   75 mg at 01/10/24 2204    multivitamin with minerals 1 tablet  1 tablet oral Daily Vidal Andrews PA-C   1 tablet at 01/10/24 0833    naloxone (Narcan) injection 0.2 mg  0.2 mg intravenous q5 min PRN Vidal Andrews PA-C        ondansetron ODT (Zofran-ODT) disintegrating tablet 4 mg  4 mg oral q8h PRN Vidal Andrews PA-C        Or     "ondansetron (Zofran) injection 4 mg  4 mg intravenous q8h PRN Vidal Andrews PA-C        oxyCODONE (Roxicodone) immediate release tablet 2.5 mg  2.5 mg oral q4h PRN Vidal Andrews PA-C   2.5 mg at 01/09/24 1741    oxyCODONE (Roxicodone) immediate release tablet 5 mg  5 mg oral q4h PRN LOLI SchmidC   5 mg at 01/10/24 1837    oxyCODONE (Roxicodone) immediate release tablet 7.5 mg  7.5 mg oral q6h PRN Vidal Andrews PA-C        pantoprazole (ProtoNix) EC tablet 20 mg  20 mg oral Daily before breakfast Vidal Andrews PA-C   20 mg at 01/11/24 0615    polyethylene glycol (Glycolax, Miralax) packet 17 g  17 g oral Daily PRN Vidal Andrews PA-C        trifluoperazine (Stelazine) tablet 1 mg  1 mg oral Nightly LUNA Albert-CNP   1 mg at 01/10/24 2203         PHYSICAL EXAM:   /63 (BP Location: Right arm, Patient Position: Lying)   Pulse 84   Temp 35.8 °C (96.4 °F) (Temporal)   Resp 18   Ht 1.6 m (5' 3\")   Wt 62.1 kg (136 lb 14.5 oz)   SpO2 93%   BMI 24.25 kg/m²      CONSTITUTIONAL - well nourished, well developed, looks like stated age, in no acute distress, not ill-appearing   SKIN - normal skin color and pigmentation, normal skin turgor without rash   HEAD - no trauma, normocephalic  EYES - pupils are equal and reactive to light, extraocular muscles are intact, and normal external exam  ENT - TM's intact, no injection, no signs of infection, uvula midline, normal tongue movement and throat normal  NECK - supple without rigidity, no neck mass was observed, no thyromegaly    CHEST - clear to auscultation, no wheezing, no crackles and no rales, good effort  CARDIAC - regular rate and regular rhythm, no skipped beats, no murmur  ABDOMEN - no organomegaly, soft, nontender, nondistended, normal bowel sounds   NEUROLOGICAL - normal gait, normal balance, normal motor, no ataxia, DTRs equal and symmetrical; alert, oriented and no focal signs  Lower extremity examination unchanged   " "    Labs:  Lab Results   Component Value Date    WBC 13.0 (H) 01/11/2024    HGB 12.7 01/11/2024    HCT 39.4 01/11/2024    MCV 91 01/11/2024     01/11/2024     Lab Results   Component Value Date    GLUCOSE 211 (H) 01/11/2024    CALCIUM 9.3 01/11/2024     (L) 01/11/2024    K 4.3 01/11/2024    CO2 24 01/11/2024    CL 98 01/11/2024    BUN 22 01/11/2024    CREATININE 0.71 01/11/2024   ESR: --No results found for: \"SEDRATE\"No results found for: \"CRP\"  Lab Results   Component Value Date    ALT 21 01/11/2024    AST 18 01/11/2024     (H) 01/09/2024    ALKPHOS 206 (H) 01/11/2024    BILITOT 0.8 01/11/2024                      Problem List Items Addressed This Visit             ICD-10-CM    Closed displaced comminuted supracondylar fracture of left humerus without intercondylar fracture with routine healing S42.422D    Relevant Medications    oxyCODONE (Roxicodone) 5 mg immediate release tablet    Closed fracture of proximal end of left humerus with malunion, unspecified fracture morphology, subsequent encounter S42.202P    Relevant Medications    oxyCODONE (Roxicodone) 5 mg immediate release tablet    * (Principal) Other closed nondisplaced fracture of proximal end of left humerus, initial encounter - Primary S42.295A     Other Visit Diagnoses         Codes    Fall, initial encounter     W19.XXXA          Continue current medications  No surgical intervention at this time  Recheck labs  Discharge pending      Malnutrition Diagnosis Status: New  Malnutrition Diagnosis: Moderate malnutrition related to acute disease or injury  As Evidenced by: 8.7% unintentional wt loss x <2 months and pt w/moderate muscle wasting.  I agree with the dietitian's malnutrition diagnosis.         Oscar Randolph MD  This note was created using [employer]. Any inadvertent grammar, spelling or syntax errors are do to voice recognition software error and are not intentional.      "

## 2024-01-12 VITALS
HEART RATE: 79 BPM | WEIGHT: 136.91 LBS | BODY MASS INDEX: 24.26 KG/M2 | RESPIRATION RATE: 19 BRPM | OXYGEN SATURATION: 95 % | SYSTOLIC BLOOD PRESSURE: 138 MMHG | TEMPERATURE: 97.9 F | HEIGHT: 63 IN | DIASTOLIC BLOOD PRESSURE: 63 MMHG

## 2024-01-12 LAB
ALBUMIN SERPL BCP-MCNC: 3.4 G/DL (ref 3.4–5)
ALP SERPL-CCNC: 174 U/L (ref 33–136)
ALT SERPL W P-5'-P-CCNC: 16 U/L (ref 7–45)
ANION GAP SERPL CALC-SCNC: 14 MMOL/L (ref 10–20)
AST SERPL W P-5'-P-CCNC: 14 U/L (ref 9–39)
BILIRUB DIRECT SERPL-MCNC: 0.4 MG/DL (ref 0–0.3)
BILIRUB SERPL-MCNC: 0.7 MG/DL (ref 0–1.2)
BUN SERPL-MCNC: 22 MG/DL (ref 6–23)
CALCIUM SERPL-MCNC: 9.4 MG/DL (ref 8.6–10.3)
CHLORIDE SERPL-SCNC: 100 MMOL/L (ref 98–107)
CO2 SERPL-SCNC: 25 MMOL/L (ref 21–32)
CREAT SERPL-MCNC: 0.62 MG/DL (ref 0.5–1.05)
EGFRCR SERPLBLD CKD-EPI 2021: 83 ML/MIN/1.73M*2
ERYTHROCYTE [DISTWIDTH] IN BLOOD BY AUTOMATED COUNT: 16.7 % (ref 11.5–14.5)
GLUCOSE BLD MANUAL STRIP-MCNC: 179 MG/DL (ref 74–99)
GLUCOSE BLD MANUAL STRIP-MCNC: 204 MG/DL (ref 74–99)
GLUCOSE SERPL-MCNC: 172 MG/DL (ref 74–99)
HCT VFR BLD AUTO: 38.4 % (ref 36–46)
HGB BLD-MCNC: 12.5 G/DL (ref 12–16)
MAGNESIUM SERPL-MCNC: 2.05 MG/DL (ref 1.6–2.4)
MCH RBC QN AUTO: 29.7 PG (ref 26–34)
MCHC RBC AUTO-ENTMCNC: 32.6 G/DL (ref 32–36)
MCV RBC AUTO: 91 FL (ref 80–100)
NRBC BLD-RTO: 0 /100 WBCS (ref 0–0)
PHOSPHATE SERPL-MCNC: 3.2 MG/DL (ref 2.5–4.9)
PLATELET # BLD AUTO: 252 X10*3/UL (ref 150–450)
POTASSIUM SERPL-SCNC: 3.9 MMOL/L (ref 3.5–5.3)
PROT SERPL-MCNC: 7.3 G/DL (ref 6.4–8.2)
RBC # BLD AUTO: 4.21 X10*6/UL (ref 4–5.2)
SODIUM SERPL-SCNC: 135 MMOL/L (ref 136–145)
WBC # BLD AUTO: 10.4 X10*3/UL (ref 4.4–11.3)

## 2024-01-12 PROCEDURE — 80053 COMPREHEN METABOLIC PANEL: CPT | Performed by: PHYSICIAN ASSISTANT

## 2024-01-12 PROCEDURE — 2500000002 HC RX 250 W HCPCS SELF ADMINISTERED DRUGS (ALT 637 FOR MEDICARE OP, ALT 636 FOR OP/ED): Performed by: PHYSICIAN ASSISTANT

## 2024-01-12 PROCEDURE — 82248 BILIRUBIN DIRECT: CPT | Performed by: PHYSICIAN ASSISTANT

## 2024-01-12 PROCEDURE — 2500000001 HC RX 250 WO HCPCS SELF ADMINISTERED DRUGS (ALT 637 FOR MEDICARE OP): Performed by: NURSE PRACTITIONER

## 2024-01-12 PROCEDURE — 2500000004 HC RX 250 GENERAL PHARMACY W/ HCPCS (ALT 636 FOR OP/ED): Performed by: PHYSICIAN ASSISTANT

## 2024-01-12 PROCEDURE — 2500000005 HC RX 250 GENERAL PHARMACY W/O HCPCS: Performed by: PHYSICIAN ASSISTANT

## 2024-01-12 PROCEDURE — 83735 ASSAY OF MAGNESIUM: CPT | Performed by: PHYSICIAN ASSISTANT

## 2024-01-12 PROCEDURE — 36415 COLL VENOUS BLD VENIPUNCTURE: CPT | Performed by: PHYSICIAN ASSISTANT

## 2024-01-12 PROCEDURE — 85027 COMPLETE CBC AUTOMATED: CPT | Performed by: PHYSICIAN ASSISTANT

## 2024-01-12 PROCEDURE — 84100 ASSAY OF PHOSPHORUS: CPT | Performed by: PHYSICIAN ASSISTANT

## 2024-01-12 PROCEDURE — G0378 HOSPITAL OBSERVATION PER HR: HCPCS

## 2024-01-12 PROCEDURE — 82947 ASSAY GLUCOSE BLOOD QUANT: CPT | Mod: 59

## 2024-01-12 PROCEDURE — 2500000001 HC RX 250 WO HCPCS SELF ADMINISTERED DRUGS (ALT 637 FOR MEDICARE OP): Performed by: PHYSICIAN ASSISTANT

## 2024-01-12 PROCEDURE — 99231 SBSQ HOSP IP/OBS SF/LOW 25: CPT | Performed by: NURSE PRACTITIONER

## 2024-01-12 RX ADMIN — AMLODIPINE BESYLATE 10 MG: 10 TABLET ORAL at 09:11

## 2024-01-12 RX ADMIN — INSULIN LISPRO 2 UNITS: 100 INJECTION, SOLUTION INTRAVENOUS; SUBCUTANEOUS at 09:25

## 2024-01-12 RX ADMIN — PANTOPRAZOLE SODIUM 20 MG: 20 TABLET, DELAYED RELEASE ORAL at 06:47

## 2024-01-12 RX ADMIN — EMPAGLIFLOZIN 10 MG: 10 TABLET, FILM COATED ORAL at 09:11

## 2024-01-12 RX ADMIN — OXYCODONE HYDROCHLORIDE AND ACETAMINOPHEN 500 MG: 500 TABLET ORAL at 09:12

## 2024-01-12 RX ADMIN — ASPIRIN 81 MG: 81 TABLET, COATED ORAL at 09:12

## 2024-01-12 RX ADMIN — INSULIN LISPRO 4 UNITS: 100 INJECTION, SOLUTION INTRAVENOUS; SUBCUTANEOUS at 12:43

## 2024-01-12 RX ADMIN — LOSARTAN POTASSIUM 100 MG: 100 TABLET, FILM COATED ORAL at 09:10

## 2024-01-12 RX ADMIN — ENOXAPARIN SODIUM 40 MG: 40 INJECTION SUBCUTANEOUS at 15:24

## 2024-01-12 RX ADMIN — FOLIC ACID 1 MG: 1 TABLET ORAL at 09:11

## 2024-01-12 RX ADMIN — Medication 1 TABLET: at 09:10

## 2024-01-12 RX ADMIN — METOPROLOL TARTRATE 75 MG: 50 TABLET, FILM COATED ORAL at 09:11

## 2024-01-12 RX ADMIN — LIDOCAINE 1 PATCH: 4 PATCH TOPICAL at 09:13

## 2024-01-12 RX ADMIN — ALPRAZOLAM 0.12 MG: 0.25 TABLET ORAL at 09:18

## 2024-01-12 RX ADMIN — LEVOTHYROXINE SODIUM 100 MCG: 0.1 TABLET ORAL at 06:47

## 2024-01-12 RX ADMIN — FLUCONAZOLE 100 MG: 100 TABLET ORAL at 09:18

## 2024-01-12 ASSESSMENT — COGNITIVE AND FUNCTIONAL STATUS - GENERAL
TOILETING: TOTAL
DAILY ACTIVITIY SCORE: 7
MOVING TO AND FROM BED TO CHAIR: A LOT
PERSONAL GROOMING: TOTAL
CLIMB 3 TO 5 STEPS WITH RAILING: TOTAL
MOBILITY SCORE: 9
DRESSING REGULAR LOWER BODY CLOTHING: TOTAL
HELP NEEDED FOR BATHING: TOTAL
MOVING FROM LYING ON BACK TO SITTING ON SIDE OF FLAT BED WITH BEDRAILS: A LOT
TURNING FROM BACK TO SIDE WHILE IN FLAT BAD: A LOT
DRESSING REGULAR UPPER BODY CLOTHING: TOTAL
WALKING IN HOSPITAL ROOM: TOTAL
STANDING UP FROM CHAIR USING ARMS: TOTAL
EATING MEALS: A LOT

## 2024-01-12 ASSESSMENT — PAIN SCALES - GENERAL: PAINLEVEL_OUTOF10: 0 - NO PAIN

## 2024-01-12 ASSESSMENT — PAIN - FUNCTIONAL ASSESSMENT: PAIN_FUNCTIONAL_ASSESSMENT: 0-10

## 2024-01-12 NOTE — CARE PLAN
The patient's goals for the shift include keep me comfortable    The clinical goals for the shift include pt will be free from falls this shift,   Pt complains of pain with movement, refuses offers of pain med and returns to sleep. Goals progressing, safety maintained.

## 2024-01-12 NOTE — CARE PLAN
The patient's goals for the shift include keep me comfortable    The clinical goals for the shift include pt will be free from falls this shift,      Problem: Pain  Goal: Takes deep breaths with improved pain control throughout the shift  1/12/2024 1445 by Malcolm Woo RN  Outcome: Adequate for Discharge  1/12/2024 0811 by Malcolm Woo RN  Outcome: Progressing  Goal: Turns in bed with improved pain control throughout the shift  1/12/2024 1445 by Malcolm Woo RN  Outcome: Adequate for Discharge  1/12/2024 0811 by Malcolm Woo RN  Outcome: Progressing  Goal: Walks with improved pain control throughout the shift  1/12/2024 1445 by Malcolm Woo RN  Outcome: Adequate for Discharge  1/12/2024 0811 by Malcolm Woo RN  Outcome: Progressing  Goal: Performs ADL's with improved pain control throughout shift  1/12/2024 1445 by Malcolm Woo RN  Outcome: Adequate for Discharge  1/12/2024 0811 by Malcolm Woo RN  Outcome: Progressing  Goal: Participates in PT with improved pain control throughout the shift  1/12/2024 1445 by Malcolm Woo RN  Outcome: Adequate for Discharge  1/12/2024 0811 by Malcolm Woo RN  Outcome: Progressing  Goal: Free from opioid side effects throughout the shift  1/12/2024 1445 by Malcolm Woo RN  Outcome: Adequate for Discharge  1/12/2024 0811 by Malcolm Woo RN  Outcome: Progressing  Goal: Free from acute confusion related to pain meds throughout the shift  1/12/2024 1445 by Malcolm Woo RN  Outcome: Adequate for Discharge  1/12/2024 0811 by Malcolm Woo RN  Outcome: Progressing     Problem: Pain  Goal: My pain/discomfort is manageable  1/12/2024 1445 by Malcolm Woo RN  Outcome: Adequate for Discharge  1/12/2024 0811 by Malcolm Woo RN  Outcome: Progressing     Problem: Safety  Goal: Patient will be injury free during hospitalization  1/12/2024 1445 by Malcolm Woo RN  Outcome: Adequate for Discharge  1/12/2024 0811 by Malcolm Woo,  RN  Outcome: Progressing     Problem: Psychosocial Needs  Goal: Demonstrates ability to cope with hospitalization/illness  1/12/2024 1445 by Malcolm Woo RN  Outcome: Adequate for Discharge  1/12/2024 0811 by Malcolm Woo RN  Outcome: Progressing  Goal: Collaborate with me, my family, and caregiver to identify my specific goals  1/12/2024 1445 by Malcolm Woo RN  Outcome: Adequate for Discharge  1/12/2024 0811 by Malcolm Woo RN  Outcome: Progressing     Problem: Discharge Barriers  Goal: My discharge needs are met  1/12/2024 1445 by Malcolm Woo RN  Outcome: Adequate for Discharge  1/12/2024 0811 by Malcolm Woo RN  Outcome: Progressing     Problem: Safety  Goal: LTG - Patient will adhere to hip precautions during ADL's and transfers  Outcome: Adequate for Discharge  Goal: LTG - Patient will demonstrate safety requirements appropriate to situation/environment  Outcome: Adequate for Discharge  Goal: LTG - Patient will utilize safety techniques  Outcome: Adequate for Discharge  Goal: STG - Patient locks brakes on wheelchair  Outcome: Adequate for Discharge  Goal: STG - Patient uses call light consistently to request assistance with transfers  Outcome: Adequate for Discharge  Goal: STG - Patient uses gait belt during all transfers  Outcome: Adequate for Discharge     Problem: Fall/Injury  Goal: Verbalize understanding of personal risk factors for fall in the hospital  1/12/2024 1445 by Malcolm Woo RN  Outcome: Adequate for Discharge  1/12/2024 0811 by Malcolm Woo RN  Outcome: Progressing  Goal: Verbalize understanding of risk factor reduction measures to prevent injury from fall in the home  1/12/2024 1445 by Malcolm Woo RN  Outcome: Adequate for Discharge  1/12/2024 0811 by Malcolm Woo RN  Outcome: Progressing  Goal: Pace activities to prevent fatigue by end of the shift  1/12/2024 1445 by Malcolm Woo RN  Outcome: Adequate for Discharge  1/12/2024 0811 by Malcolm  JUAN J Woo  Outcome: Progressing     Problem: Skin  Goal: Decreased wound size/increased tissue granulation at next dressing change  1/12/2024 1445 by Malcolm Woo RN  Outcome: Adequate for Discharge  1/12/2024 0811 by Malcolm Woo RN  Outcome: Progressing  Goal: Participates in plan/prevention/treatment measures  1/12/2024 1445 by Malcolm Woo RN  Outcome: Adequate for Discharge  1/12/2024 0811 by Malcolm Woo RN  Outcome: Progressing  Goal: Prevent/manage excess moisture  1/12/2024 1445 by Malcolm Woo RN  Outcome: Adequate for Discharge  1/12/2024 0811 by Malcolm Woo RN  Outcome: Progressing     Problem: PT Problem  Goal: PT Goal 1  Outcome: Adequate for Discharge  Goal: PT Goal 2  Outcome: Adequate for Discharge  Goal: PT Goal 3  Outcome: Adequate for Discharge     Problem: OT Goals  Goal: OT Goal 1  Outcome: Adequate for Discharge  Goal: OT Goal 2  Outcome: Adequate for Discharge  Goal: OT Goal 3  Outcome: Adequate for Discharge  Goal: OT Goal 4  Outcome: Adequate for Discharge  Goal: OT Goal 5  Outcome: Adequate for Discharge

## 2024-01-12 NOTE — PROGRESS NOTES
Transport set for 1:45 PM today. Called daughter Rubina . Message left to return my call.  1317 Mariela Méndez (Child)[phone] (Home Phone)  Called again with no return call.  1324 Daughter returned call. Is ok with discharge  to [facility].    Geetha Carr RN

## 2024-01-12 NOTE — NURSING NOTE
Iv  removed.  Report  was  called  to oumou louis,  report  to  sebastian.  Pt.  Will be  transferred there  later  today.

## 2024-01-12 NOTE — CARE PLAN
The patient's goals for the shift include keep me comfortable    The clinical goals for the shift include pt will be free from falls this shift,      Problem: Pain  Goal: Takes deep breaths with improved pain control throughout the shift  Outcome: Progressing  Goal: Turns in bed with improved pain control throughout the shift  Outcome: Progressing  Goal: Walks with improved pain control throughout the shift  Outcome: Progressing  Goal: Performs ADL's with improved pain control throughout shift  Outcome: Progressing  Goal: Participates in PT with improved pain control throughout the shift  Outcome: Progressing  Goal: Free from opioid side effects throughout the shift  Outcome: Progressing  Goal: Free from acute confusion related to pain meds throughout the shift  Outcome: Progressing     Problem: Pain  Goal: My pain/discomfort is manageable  Outcome: Progressing     Problem: Safety  Goal: Patient will be injury free during hospitalization  Outcome: Progressing     Problem: Psychosocial Needs  Goal: Demonstrates ability to cope with hospitalization/illness  Outcome: Progressing  Goal: Collaborate with me, my family, and caregiver to identify my specific goals  Outcome: Progressing     Problem: Discharge Barriers  Goal: My discharge needs are met  Outcome: Progressing     Problem: Fall/Injury  Goal: Verbalize understanding of personal risk factors for fall in the hospital  Outcome: Progressing  Goal: Verbalize understanding of risk factor reduction measures to prevent injury from fall in the home  Outcome: Progressing  Goal: Pace activities to prevent fatigue by end of the shift  Outcome: Progressing     Problem: Skin  Goal: Decreased wound size/increased tissue granulation at next dressing change  Outcome: Progressing  Goal: Participates in plan/prevention/treatment measures  Outcome: Progressing  Goal: Prevent/manage excess moisture  Outcome: Progressing

## 2024-01-12 NOTE — NURSING NOTE
Report  to   sebastian  at  [facility]  and pt.  Was  transferred  there.

## 2024-01-12 NOTE — PROGRESS NOTES
"Toshia Méndez is a 89 y.o. female on day 0 of admission presenting with Other closed nondisplaced fracture of proximal end of left humerus, initial encounter.      Subjective   No overnight events       Objective     Last Recorded Vitals  Blood pressure 138/63, pulse 79, temperature 36.6 °C (97.9 °F), temperature source Temporal, resp. rate 19, height 1.6 m (5' 3\"), weight 62.1 kg (136 lb 14.5 oz), SpO2 95 %.    Physical Exam  Neurological Exam  Mental Status: Sleeping but awakens. Oriented to person, place and time. Speech was fluent to history. Naming, repetition intact.   CN: VFF, PERRL, EOMI. Facial sensation was intact to light touch bilaterally. Facial expression was symmetric. Palate elevated symmetrically. Shoulder shrug was symmetric. Tongue protruded midline.   Motor: Normal muscle bulk and tone. Strength was (R/L) 5/5 shoulder abduction, elbow flexion/extension,  strenght, hip flexion, knee flexion/extension, ankle dorsi- and plantar flexion. There were no abnormal movements.   Sensory: Intact to light touch in all 4 extremities. Coordination: Finger to nose intact with no dysmetria.     Relevant Results  Scheduled medications  [Held by provider] acetaminophen, 650 mg, oral, q6h  ALPRAZolam, 0.125 mg, oral, BID  amLODIPine, 10 mg, oral, Daily  ascorbic acid, 500 mg, oral, Daily  aspirin, 81 mg, oral, Daily  [Held by provider] atorvastatin, 80 mg, oral, Daily  chlorproMAZINE, 10 mg, oral, Nightly  empagliflozin, 10 mg, oral, Daily  enoxaparin, 40 mg, subcutaneous, q24h  fluconazole, 100 mg, oral, Daily  folic acid, 1 mg, oral, Daily  furosemide, 20 mg, oral, Every Mon/Wed/Fri  [Held by provider] glimepiride, 4 mg, oral, BID  insulin lispro, 0-10 Units, subcutaneous, Before meals & nightly  levothyroxine, 100 mcg, oral, Daily before breakfast  lidocaine, 1 patch, transdermal, Daily  losartan, 100 mg, oral, Daily  melatonin, 3 mg, oral, Daily  metoprolol tartrate, 75 mg, oral, BID  multivitamin with " minerals, 1 tablet, oral, Daily  pantoprazole, 20 mg, oral, Daily before breakfast  trifluoperazine, 1 mg, oral, Nightly      Continuous medications     PRN medications  PRN medications: bisacodyl, dextrose 10 % in water (D10W), dextrose, glucagon, ipratropium-albuteroL, naloxone, ondansetron ODT **OR** ondansetron, oxyCODONE, oxyCODONE, oxyCODONE, polyethylene glycol  Results for orders placed or performed during the hospital encounter of 01/08/24 (from the past 24 hour(s))   POCT GLUCOSE   Result Value Ref Range    POCT Glucose 214 (H) 74 - 99 mg/dL   POCT GLUCOSE   Result Value Ref Range    POCT Glucose 205 (H) 74 - 99 mg/dL   POCT GLUCOSE   Result Value Ref Range    POCT Glucose 179 (H) 74 - 99 mg/dL   CBC   Result Value Ref Range    WBC 10.4 4.4 - 11.3 x10*3/uL    nRBC 0.0 0.0 - 0.0 /100 WBCs    RBC 4.21 4.00 - 5.20 x10*6/uL    Hemoglobin 12.5 12.0 - 16.0 g/dL    Hematocrit 38.4 36.0 - 46.0 %    MCV 91 80 - 100 fL    MCH 29.7 26.0 - 34.0 pg    MCHC 32.6 32.0 - 36.0 g/dL    RDW 16.7 (H) 11.5 - 14.5 %    Platelets 252 150 - 450 x10*3/uL   Hepatic function panel   Result Value Ref Range    Albumin 3.4 3.4 - 5.0 g/dL    Bilirubin, Total 0.7 0.0 - 1.2 mg/dL    Bilirubin, Direct 0.4 (H) 0.0 - 0.3 mg/dL    Alkaline Phosphatase 174 (H) 33 - 136 U/L    ALT 16 7 - 45 U/L    AST 14 9 - 39 U/L    Total Protein 7.3 6.4 - 8.2 g/dL   Magnesium   Result Value Ref Range    Magnesium 2.05 1.60 - 2.40 mg/dL   Phosphorus   Result Value Ref Range    Phosphorus 3.2 2.5 - 4.9 mg/dL   Basic Metabolic Panel   Result Value Ref Range    Glucose 172 (H) 74 - 99 mg/dL    Sodium 135 (L) 136 - 145 mmol/L    Potassium 3.9 3.5 - 5.3 mmol/L    Chloride 100 98 - 107 mmol/L    Bicarbonate 25 21 - 32 mmol/L    Anion Gap 14 10 - 20 mmol/L    Urea Nitrogen 22 6 - 23 mg/dL    Creatinine 0.62 0.50 - 1.05 mg/dL    eGFR 83 >60 mL/min/1.73m*2    Calcium 9.4 8.6 - 10.3 mg/dL   POCT GLUCOSE   Result Value Ref Range    POCT Glucose 204 (H) 74 - 99 mg/dL      US right upper quadrant    Result Date: 1/9/2024  Interpreted By:  Mauro Carpenter, STUDY: US RIGHT UPPER QUADRANT;  1/9/2024 5:38 pm   INDICATION: Signs/Symptoms:Elevated alkaline phosphatase level, elevated GGT.   COMPARISON: None.   ACCESSION NUMBER(S): [id number]   ORDERING CLINICIAN: KARLI MALIN   TECHNIQUE: Multiple images of the right upper quadrant were obtained.   FINDINGS: LIVER: Normal size with its right lobe measuring at least 13.8 cm in length. Normal homogeneous echotexture.   GALLBLADDER: Status post cholecystectomy by history.   BILIARY TREE: No intra or extrahepatic biliary dilatation is identified. The common bile duct measures 6.5 mm in diameter.   PANCREAS: Normal body contour and echotexture. The head and tail are largely obscured by gas. Borderline caliber to minimally dilated pancreatic duct.   RIGHT KIDNEY: The right kidney is normal in size, measuring 10.6 cm in craniocaudal dimension. The renal cortical echogenicity and thickness are within normal limits. No hydronephrosis or renal mass is seen.       Essentially normal study.   MACRO: None   Signed by: Mauro Carpenter 1/9/2024 6:14 PM Dictation workstation:   [id number]    XR shoulder left 2+ views    Result Date: 1/8/2024  Interpreted By:  Silvino Jack, STUDY: XR CHEST 1 VIEW; XR SHOULDER LEFT 2+ VIEWS;  1/8/2024 7:13 pm; 1/8/2024 7:12 pm   INDICATION: Signs/Symptoms:fall; Signs/Symptoms:shoulder pain, dislocation?.   COMPARISON: Chest and shoulder radiograph dated 11/23/2023.   ACCESSION NUMBER(S): [id number]; [id number]   ORDERING CLINICIAN: MARCO NJ   FINDINGS:   CARDIOMEDIASTINAL SILHOUETTE: Cardiomediastinal silhouette is normal in size and configuration. There is unchanged left subclavian approach dual lead pacemaker. Sternotomy wires grossly unchanged.   LUNGS/PLEURA: There are no consolidations.There are no pleural effusions. There is no demonstrated pneumothorax.     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 No shoulder dislocation.       1.  No evidence of acute cardiopulmonary process. 2. There is redemonstration of displaced comminuted fracture of the surgical neck of humerus. There is nonunion and persistent  medial displacement of the distal fracture fragment. No shoulder dislocation.     Signed by: Silvino Jack 1/8/2024 8:03 PM Dictation workstation:   [id number]    XR chest 1 view    Result Date: 1/8/2024  Interpreted By:  Silvino Jack, STUDY: XR CHEST 1 VIEW; XR SHOULDER LEFT 2+ VIEWS;  1/8/2024 7:13 pm; 1/8/2024 7:12 pm   INDICATION: Signs/Symptoms:fall; Signs/Symptoms:shoulder pain, dislocation?.   COMPARISON: Chest and shoulder radiograph dated 11/23/2023.   ACCESSION NUMBER(S): [id number]; [id number]   ORDERING CLINICIAN: MARCO NJ   FINDINGS:   CARDIOMEDIASTINAL SILHOUETTE: Cardiomediastinal silhouette is normal in size and configuration. There is unchanged left subclavian approach dual lead pacemaker. Sternotomy wires grossly unchanged.   LUNGS/PLEURA: There are no consolidations.There are no pleural effusions. There is no demonstrated pneumothorax.     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 No shoulder dislocation.       1.  No evidence of acute cardiopulmonary process. 2. There is redemonstration of displaced comminuted fracture of the surgical neck of humerus. There is nonunion and persistent  medial displacement of the distal fracture fragment. No shoulder dislocation.     Signed by: Silvino Jack 1/8/2024 8:03 PM Dictation workstation:   [id number]    XR hip right with pelvis when performed 2 or 3 views    Result Date: 1/8/2024  Interpreted By:  Silvino Jack, STUDY: XR HIP RIGHT WITH PELVIS WHEN PERFORMED 2 OR 3 VIEWS; ;  1/8/2024 7:12 pm   INDICATION: Signs/Symptoms:hip pain s/p fall.   COMPARISON: Pelvic radiograph  dated 11/23/2022.   ACCESSION NUMBER(S): [id number]   ORDERING CLINICIAN: MARCO NJ   FINDINGS: Femoral heads are normal in contour and position without evidence of fracture or dislocation. There is suggestion of mild to moderate bilateral femoroacetabular joint arthropathy with joint space narrowing and marginal osteophytes. Ilioischial and iliopubic lines are maintained. Partially visualized degenerative change in the lower lumbar spine and there are bilateral vascular atherosclerotic calcifications.       No radiographic evidence of fracture or dislocation.     MACRO: None   Signed by: Silvino Jack 1/8/2024 7:59 PM Dictation workstation:   [id number]    CT head wo IV contrast    Result Date: 1/8/2024  Interpreted By:  Buck Anna, STUDY: CT HEAD WO IV CONTRAST; CT CERVICAL SPINE WO IV CONTRAST;  1/8/2024 6:51 pm   INDICATION: Trauma. Signs/Symptoms:closed head injury.   COMPARISON: November 23, 2023 CT head and cervical spine examinations. October 31, 2020 chest CT   ACCESSION NUMBER(S): [id number]; [id number]   ORDERING CLINICIAN: MARCO NJ   TECHNIQUE: Axial noncontrast head and cervical spine CT   FINDINGS: Head:   Stable gliosis right parietooccipital region and right periventricular region most likely related to remote infarcts. The ventricles, sulci, and cisterns are mildly prominent compatible with atrophy which is stable. There are stable nonspecific low-density white matter changes which are most likely chronic microvascular changes. No detected acute intra, or extra-axial fluid collection, acute mass or mass effect. There is normal gray-white differentiation. No detected calvarial fracture. The paranasal sinuses and mastoid air cells are clear.   Cervical spine:   Alignment: Stable alignment including 2 mm C4 and C5 anterolisthesis.   Cranial cervical junction: Stable appearance.   Fracture: No detected acute fracture.   Vertebra and intervertebral disc spaces: Stable  multilevel mild-to-moderate spondylosis and degenerative disc changes.   Paravertebral soft tissues: Stable appearance without evident hematoma. Mild scattered arterial vascular calcifications. Stable curvilinear bandlike thickening at the right lung apex compatible with postinflammatory scar with punctate calcifications.   Brain Injury (BIG) guidelines CT values:   Skull fracture: No SDH (subdural hematoma): None detected EDH (epidural hemtoma): None detected IPH (intraparenchymal hemorrhage): None detected SAH (subarachnoid hemorrhage): None detected IVH (intraventricular hemorrhage): No   Reference: Dakota LAINEZ, David RS, Hermes DE LA CRUZ, et al. The BIG (brain injury guidelines) project: defining the management of traumatic brain injury by acute care surgeons. J Trauma Acute Care Surg. 2014;76:818n971.       No acute intracranial abnormality was identified.   No detected fracture.   Stable pre-existing findings as reported.   MACRO: None     Signed by: Buck Anna 1/8/2024 7:05 PM Dictation workstation:   [id number]    CT cervical spine wo IV contrast    Result Date: 1/8/2024  Interpreted By:  Buck Anna, STUDY: CT HEAD WO IV CONTRAST; CT CERVICAL SPINE WO IV CONTRAST;  1/8/2024 6:51 pm   INDICATION: Trauma. Signs/Symptoms:closed head injury.   COMPARISON: November 23, 2023 CT head and cervical spine examinations. October 31, 2020 chest CT   ACCESSION NUMBER(S): [id number]; [id number]   ORDERING CLINICIAN: MARCO NJ   TECHNIQUE: Axial noncontrast head and cervical spine CT   FINDINGS: Head:   Stable gliosis right parietooccipital region and right periventricular region most likely related to remote infarcts. The ventricles, sulci, and cisterns are mildly prominent compatible with atrophy which is stable. There are stable nonspecific low-density white matter changes which are most likely chronic microvascular changes. No detected acute intra, or extra-axial fluid collection, acute mass or mass effect.  There is normal gray-white differentiation. No detected calvarial fracture. The paranasal sinuses and mastoid air cells are clear.   Cervical spine:   Alignment: Stable alignment including 2 mm C4 and C5 anterolisthesis.   Cranial cervical junction: Stable appearance.   Fracture: No detected acute fracture.   Vertebra and intervertebral disc spaces: Stable multilevel mild-to-moderate spondylosis and degenerative disc changes.   Paravertebral soft tissues: Stable appearance without evident hematoma. Mild scattered arterial vascular calcifications. Stable curvilinear bandlike thickening at the right lung apex compatible with postinflammatory scar with punctate calcifications.   Brain Injury (BIG) guidelines CT values:   Skull fracture: No SDH (subdural hematoma): None detected EDH (epidural hemtoma): None detected IPH (intraparenchymal hemorrhage): None detected SAH (subarachnoid hemorrhage): None detected IVH (intraventricular hemorrhage): No   Reference: Dakota LAINEZ, David RS, Hermes M, et al. The BIG (brain injury guidelines) project: defining the management of traumatic brain injury by acute care surgeons. J Trauma Acute Care Surg. 2014;76:764x885.       No acute intracranial abnormality was identified.   No detected fracture.   Stable pre-existing findings as reported.   MACRO: None     Signed by: Buck Anna 1/8/2024 7:05 PM Dictation workstation:   [id number]                    Assessment/Plan      Principal Problem:    Other closed nondisplaced fracture of proximal end of left humerus, initial encounter    Metabolic encephalopathy 2/2 UTI; pain 2/2 L humerus fracture; history of stroke with chronic L sided weakness    Treat UTI and pain per primary care team.  No further neurological work up necessary.    Case/plan discussed with DR. Condon  No further needs from neurology; okay for transfer or discharge as per primary team. Please contact if condition changes for re-eval.           I spent 25 minutes in  the professional and overall care of this patient.      Glenys Tavares, APRN-CNP

## 2024-01-13 LAB — BACTERIA UR CULT: ABNORMAL

## 2024-01-14 ENCOUNTER — NURSING HOME VISIT (OUTPATIENT)
Dept: POST ACUTE CARE | Facility: EXTERNAL LOCATION | Age: 89
End: 2024-01-14
Payer: MEDICARE

## 2024-01-14 DIAGNOSIS — S42.422D: ICD-10-CM

## 2024-01-14 DIAGNOSIS — Z86.73 OLD CEREBROVASCULAR ACCIDENT (CVA) WITHOUT LATE EFFECT: ICD-10-CM

## 2024-01-14 DIAGNOSIS — N18.30 STAGE 3 CHRONIC KIDNEY DISEASE, UNSPECIFIED WHETHER STAGE 3A OR 3B CKD (MULTI): ICD-10-CM

## 2024-01-14 DIAGNOSIS — E11.40 TYPE 2 DIABETES MELLITUS WITH DIABETIC NEUROPATHY, WITHOUT LONG-TERM CURRENT USE OF INSULIN (MULTI): ICD-10-CM

## 2024-01-14 DIAGNOSIS — F41.8 DEPRESSION WITH ANXIETY: Primary | ICD-10-CM

## 2024-01-14 DIAGNOSIS — Z95.0 PACEMAKER: ICD-10-CM

## 2024-01-14 PROCEDURE — 99309 SBSQ NF CARE MODERATE MDM 30: CPT | Performed by: INTERNAL MEDICINE

## 2024-01-14 NOTE — LETTER
Patient: Toshia Méndez  : 1929    Encounter Date: 2024    Subjective  Patient ID: Toshia Méndez is a 89 y.o. female who is acute skilled care being seen and evaluated for multiple medical problems.    89-year-old female patient was having fall, this fall was mechanical fall, patient herself says that because she was trying to get something from the ground on the left side while she was seated, she lost her balance and she fell on the left side, interestingly and fortunately the fracture was not more displaced than what it was when patient originally came with the fracture, radiographs showed a displaced fracture with callus formation, according to the orthopedic documentation fracture was not persistent with the fall, patient was admitted but patient did not require surgery.  Patient has old stroke, previously patient has received thrombolytic conversion to hemorrhagic stroke.  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  After keeping patient in the hospital for 3 days patient is brought over here back for continuation of skilled nursing and rehabilitation.  When I see this patient actually she was looking much better, they did not help to put her shoulder sling also, shoulder x-rays were reviewed, transiently patient was confused and disoriented because of the excessive amount of pain medication.  Now patient is not confused, patient was seen by neurology for some reason.  Patient is on the antipsychotics, patient's blood sugars has been out of ordinary while she was here.  Patient is back here in the facility for further continuation of skilled nursing and rehabilitation.         Review of Systems   Constitutional: Negative.    Respiratory:  Negative for cough, chest tightness and shortness of breath.    Cardiovascular: Negative.    Gastrointestinal:  Negative for abdominal distention, constipation and diarrhea.    Musculoskeletal:  Positive for arthralgias. Negative for joint swelling.   Skin: Negative.    Neurological:  Positive for weakness. Negative for dizziness.       Objective  /78   Pulse 76     Physical Exam  Constitutional:       General: She is not in acute distress.     Appearance: Normal appearance. She is normal weight. She is not ill-appearing.   HENT:      Head: Normocephalic.   Eyes:      Conjunctiva/sclera: Conjunctivae normal.   Cardiovascular:      Rate and Rhythm: Normal rate and regular rhythm.      Heart sounds: Normal heart sounds.   Pulmonary:      Breath sounds: Normal breath sounds.   Abdominal:      General: Abdomen is flat.      Palpations: Abdomen is soft.   Musculoskeletal:         General: Tenderness present.      Left upper arm: Swelling, edema, deformity and tenderness present.      Cervical back: Neck supple.   Skin:     General: Skin is warm and dry.   Neurological:      General: No focal deficit present.      Mental Status: She is oriented to person, place, and time. Mental status is at baseline.      Motor: Weakness present.   Psychiatric:         Mood and Affect: Mood normal.         Cognition and Memory: Cognition is impaired.   Assessment/Plan  Problem List Items Addressed This Visit             ICD-10-CM    Stage 3 chronic kidney disease, unspecified whether stage 3a or 3b CKD (CMS/[facility]) N18.30    Depression with anxiety - Primary F41.8    Diabetes mellitus, type II (CMS/[facility]) E11.9    Pacemaker Z95.0    Old cerebrovascular accident (CVA) without late effect Z86.73    Closed displaced comminuted supracondylar fracture of left humerus without intercondylar fracture with routine healing S42.422D   Hospitalization data and information were reviewed, fortunately fracture was not more worse than what it was.  Patient's family has been made aware.  Patient remains on alprazolam, amlodipine, aspirin, chlorpromazine 10 mg, Jardiance 10 mg, Lasix, glimepiride 8 mg, levothyroxine,  losartan, Lovaza, metoprolol, oxycodone, omeprazole, trifluoperazine.  2 narcotics will not be given, pain is not out of ordinary.  Patient looks well, patient is not requiring any sling.  LFTs are normalized, alkaline phosphatase is slightly abnormal.  Blood sugars will be monitored back to routine, Tradjenta will not be started, hopefully patient will not require prolonged course of rehabilitation and skilled nursing care.  Further laboratories shows persistence of normalization of LFTs.  No change in medications, physical therapy reevaluation, orthopedic follow-up as listed, it is a nonsurgical treatment for fracture of humerus.  Residual effects of CVA persist, pacemaker has been checked, stage IIIa CKD persists.     Goals    None           Electronically Signed By: Jamal Villagran MD   1/15/24  9:02 PM

## 2024-01-15 VITALS — DIASTOLIC BLOOD PRESSURE: 78 MMHG | SYSTOLIC BLOOD PRESSURE: 134 MMHG | HEART RATE: 76 BPM

## 2024-01-15 ASSESSMENT — ENCOUNTER SYMPTOMS
WEAKNESS: 1
SHORTNESS OF BREATH: 0
CONSTIPATION: 0
CHEST TIGHTNESS: 0
JOINT SWELLING: 0
CARDIOVASCULAR NEGATIVE: 1
ARTHRALGIAS: 1
COUGH: 0
CONSTITUTIONAL NEGATIVE: 1
ABDOMINAL DISTENTION: 0
DIZZINESS: 0
DIARRHEA: 0

## 2024-01-16 NOTE — PROGRESS NOTES
Subjective   Patient ID: Toshia Méndez is a 89 y.o. female who is acute skilled care being seen and evaluated for multiple medical problems.    89-year-old female patient was having fall, this fall was mechanical fall, patient herself says that because she was trying to get something from the ground on the left side while she was seated, she lost her balance and she fell on the left side, interestingly and fortunately the fracture was not more displaced than what it was when patient originally came with the fracture, radiographs showed a displaced fracture with callus formation, according to the orthopedic documentation fracture was not persistent with the fall, patient was admitted but patient did not require surgery.  Patient has old stroke, previously patient has received thrombolytic conversion to hemorrhagic stroke.  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  After keeping patient in the hospital for 3 days patient is brought over here back for continuation of skilled nursing and rehabilitation.  When I see this patient actually she was looking much better, they did not help to put her shoulder sling also, shoulder x-rays were reviewed, transiently patient was confused and disoriented because of the excessive amount of pain medication.  Now patient is not confused, patient was seen by neurology for some reason.  Patient is on the antipsychotics, patient's blood sugars has been out of ordinary while she was here.  Patient is back here in the facility for further continuation of skilled nursing and rehabilitation.         Review of Systems   Constitutional: Negative.    Respiratory:  Negative for cough, chest tightness and shortness of breath.    Cardiovascular: Negative.    Gastrointestinal:  Negative for abdominal distention, constipation and diarrhea.   Musculoskeletal:  Positive for arthralgias. Negative for joint swelling.   Skin:  Negative.    Neurological:  Positive for weakness. Negative for dizziness.       Objective   /78   Pulse 76     Physical Exam  Constitutional:       General: She is not in acute distress.     Appearance: Normal appearance. She is normal weight. She is not ill-appearing.   HENT:      Head: Normocephalic.   Eyes:      Conjunctiva/sclera: Conjunctivae normal.   Cardiovascular:      Rate and Rhythm: Normal rate and regular rhythm.      Heart sounds: Normal heart sounds.   Pulmonary:      Breath sounds: Normal breath sounds.   Abdominal:      General: Abdomen is flat.      Palpations: Abdomen is soft.   Musculoskeletal:         General: Tenderness present.      Left upper arm: Swelling, edema, deformity and tenderness present.      Cervical back: Neck supple.   Skin:     General: Skin is warm and dry.   Neurological:      General: No focal deficit present.      Mental Status: She is oriented to person, place, and time. Mental status is at baseline.      Motor: Weakness present.   Psychiatric:         Mood and Affect: Mood normal.         Cognition and Memory: Cognition is impaired.   Assessment/Plan   Problem List Items Addressed This Visit             ICD-10-CM    Stage 3 chronic kidney disease, unspecified whether stage 3a or 3b CKD (CMS/[facility]) N18.30    Depression with anxiety - Primary F41.8    Diabetes mellitus, type II (CMS/[facility]) E11.9    Pacemaker Z95.0    Old cerebrovascular accident (CVA) without late effect Z86.73    Closed displaced comminuted supracondylar fracture of left humerus without intercondylar fracture with routine healing S42.422D   Hospitalization data and information were reviewed, fortunately fracture was not more worse than what it was.  Patient's family has been made aware.  Patient remains on alprazolam, amlodipine, aspirin, chlorpromazine 10 mg, Jardiance 10 mg, Lasix, glimepiride 8 mg, levothyroxine, losartan, Lovaza, metoprolol, oxycodone, omeprazole, trifluoperazine.  2 narcotics will  not be given, pain is not out of ordinary.  Patient looks well, patient is not requiring any sling.  LFTs are normalized, alkaline phosphatase is slightly abnormal.  Blood sugars will be monitored back to routine, Tradjenta will not be started, hopefully patient will not require prolonged course of rehabilitation and skilled nursing care.  Further laboratories shows persistence of normalization of LFTs.  No change in medications, physical therapy reevaluation, orthopedic follow-up as listed, it is a nonsurgical treatment for fracture of humerus.  Residual effects of CVA persist, pacemaker has been checked, stage IIIa CKD persists.     Goals    None

## 2024-01-19 ENCOUNTER — NURSING HOME VISIT (OUTPATIENT)
Dept: POST ACUTE CARE | Facility: EXTERNAL LOCATION | Age: 89
End: 2024-01-19
Payer: MEDICARE

## 2024-01-19 VITALS
OXYGEN SATURATION: 98 % | RESPIRATION RATE: 18 BRPM | SYSTOLIC BLOOD PRESSURE: 138 MMHG | DIASTOLIC BLOOD PRESSURE: 70 MMHG | WEIGHT: 131 LBS | BODY MASS INDEX: 23.21 KG/M2 | HEART RATE: 72 BPM | HEIGHT: 63 IN | TEMPERATURE: 97.6 F

## 2024-01-19 DIAGNOSIS — Z74.09 IMPAIRED FUNCTIONAL MOBILITY, BALANCE, GAIT, AND ENDURANCE: Primary | ICD-10-CM

## 2024-01-19 DIAGNOSIS — R52 PAIN: ICD-10-CM

## 2024-01-19 DIAGNOSIS — S42.422D: ICD-10-CM

## 2024-01-19 PROCEDURE — 99308 SBSQ NF CARE LOW MDM 20: CPT | Performed by: PHYSICIAN ASSISTANT

## 2024-01-19 NOTE — PROGRESS NOTES
"1/19/2024  Name: Toshia Méndez  YOB: 1929    Chief complaint: Impaired mobility. Fall risks.    HPI: Patient seated at time of exam. Patient is able to walk 40 feet with walker during supervised ambulation. She is also using quad cane. Patient remains weak despite making some progress with therapy sessions. She has history of CVA with L upper and lower extremity weakness. She has fallen once during her stay on SNF. Planning on discharge to assisted living.     Extremity Weakness  This is a recurrent problem. The current episode started more than 1 month ago. The problem occurs daily. The problem has been gradually improving. Pertinent negatives include no abdominal pain, arthralgias, change in bowel habit, chest pain, chills, fever, headaches, myalgias, nausea, numbness or urinary symptoms. The symptoms are aggravated by walking. She has tried position changes, relaxation, rest and acetaminophen for the symptoms. The treatment provided moderate relief.     Review of systems:   ROS negative except were noted in HPI.    Code Status: full code    /70   Pulse 72   Temp 36.4 °C (97.6 °F)   Resp 18   Ht 1.6 m (5' 3\")   Wt 59.4 kg (131 lb)   SpO2 98%   BMI 23.21 kg/m²         Physical Exam  Constitutional:       General: She is not in acute distress.  HENT:      Head: Normocephalic.      Nose: Nose normal.      Mouth/Throat:      Mouth: Mucous membranes are moist.   Eyes:      Extraocular Movements: Extraocular movements intact.      Pupils: Pupils are equal, round, and reactive to light.   Cardiovascular:      Rate and Rhythm: Normal rate and regular rhythm.      Pulses: Normal pulses.   Pulmonary:      Effort: Pulmonary effort is normal.      Breath sounds: Normal breath sounds.   Abdominal:      General: Bowel sounds are normal.      Palpations: Abdomen is soft.      Tenderness: There is no abdominal tenderness. There is no guarding.   Genitourinary:     Comments: " Voiding  Musculoskeletal:         General: Normal range of motion.      Cervical back: Normal range of motion.      Comments: Decreased ROM of L shoulder.  Skin:     General: Skin is warm and dry.      Capillary Refill: Capillary refill takes less than 2 seconds.   Neurological:      General: No focal deficit present.      Mental Status: She is alert and oriented to person, place, and time.   Psychiatric:         Mood and Affect: Mood normal.         Behavior: Behavior normal.     Medications reviewed during visit at facility.  Aspirin 81 mg po daily  Omeprazole 10 mg po daily  Vitamin C 500 mg po daily  Centrum one tablet po daily  Amlodipine 10 mg po daily  Alprazolam 0.25 1/2 tablet po daily  Lasix 20 mg Mon, , and Friday  Atorvastatin 80 mg po daily  Empaglifizon 10 mg po daily  Trifluoperazine HCl Oral Tablet 2 mg po q hs  Losartan 100 mg po daily  Tylenol 650 mg po q 4 hours prn  MOM 30 ml po daily prn  Tramadol 50 mg po q 8 prn.  Glimepiride 4 mg po bid  Metoprolol tartrate 75 mg po bid  Lovasa 1 gram two capsules po daily  Levothyroxine 100 mcg po daily  Miralax 17 grams po daily  Biotin 5 mg po daily  Chlorpromazine HCl Oral Tablet 10 mg po q hs  Folic acid 1 mg po daily  Oxycodone 5 mg 1/2 tablet po q 4 hours prn  Tramadol 50 mg po q 8 hour prn  Metoprolol tartrate 75 mg po bid    Labs reviewed at facility:     Laboratory Service Report [id number]   Patient Name   ESA MCNALLY  Patient ID   [id number]  Age   89 Y  Gender   F  Order #      Ordering DILIP Leonard  Patient Telephone #   [phone]     1929  AKA      Client Order #   [id number]   Collection Date and Time   01/15/2024 09:31   Print Date and Time   2024 16:54  Account Information   [facility] [place]   [address] [place] [address]     Report Notes                  Test Results   Reference Perform  Unit  Value Site*             CBC and Differential  REPORTED 01/15/2024 12:42  White  Blood Cell Count   4.95 k/uL 3.70-11.00    RBC   4.17 m/uL 3.90-5.20    Hemoglobin   12.6 g/dL 11.5-15.5    Hematocrit   37.9 % 36.0-46.0    MCV   90.9 fL 80.0-100.0    MCH   30.2 pg 26.0-34.0    MCHC   33.2 g/dL 30.5-36.0    RDW-CV H 16.0 % 11.5-15.0    Platelet Count   301 k/uL 150-400    MPV   10.6 fL 9.0-12.7    Neut%   60.2 %    Abs Neut   2.98 k/uL 1.45-7.50    Lymph%   24.4 %    Abs Lymph   1.21 k/uL 1.00-4.00    Mono%   8.9 %    Abs Mono   0.44 k/uL <0.87    Eosin%   5.1 %    Abs Eosin   0.25 k/uL <0.46    Baso%   1.0 %    Abs Baso   0.05 k/uL <0.11    Immature Gran %%   0.4 %    Abs Immature Gran   <0.03 k/uL <0.10    NRBC   0.0 /100 WBC    Absolute nRBC   <0.01 k/uL <0.01    Diff Type   Auto               Comp Metabolic Panel  REPORTED 01/15/2024 12:58  Protein, Total   7.0 g/dL 6.3-8.0    Albumin L 3.0 g/dL 3.9-4.9    Calcium, Total   8.9 mg/dL 8.5-10.2    Bilirubin, Total   0.3 mg/dL 0.2-1.3    Alkaline Phosphatase H 165 U/L     AST L 12 U/L 13-35    ALT   11 U/L 7-38    Glucose H 298 mg/dL 74-99  BUN   14 mg/dL 7-21    Creatinine L 0.56 mg/dL 0.58-0.96    Sodium   139 mmol/L 136-144    Potassium   4.2 mmol/L 3.7-5.1    Chloride   105 mmol/L     CO2   23 mmol/L 22-30    Anion Gap   11 mmol/L 9-18    Estimated Glomerular Filtration Rate   85 mL/min/1.73 meters squared >=60   Assessment/Plan    Problem List Items Addressed This Visit       Closed displaced comminuted supracondylar fracture of left humerus without intercondylar fracture with routine healing     Schedule follow up with Kris Andrade MD on 1/22/24         Impaired functional mobility, balance, gait, and endurance - Primary     Review physical and occupational therapy notes. Walker or quad cane for ambulation. Discuss plans to discharge patient to assisted living with social service.         Pain     Tramadol 50 mg po q 8 prn alternate with Tylenol.             Time:    Dudley Acosta PA-C

## 2024-01-19 NOTE — LETTER
"Patient: Toshia Méndez  : 1929    Encounter Date: 2024  Name: Toshia Méndez  YOB: 1929    Chief complaint: Impaired mobility. Fall risks.    HPI: Patient seated at time of exam. Patient is able to walk 40 feet with walker during supervised ambulation. She is also using quad cane. Patient remains weak despite making some progress with therapy sessions. She has history of CVA with L upper and lower extremity weakness. She has fallen once during her stay on SNF. Planning on discharge to assisted living.     Extremity Weakness  This is a recurrent problem. The current episode started more than 1 month ago. The problem occurs daily. The problem has been gradually improving. Pertinent negatives include no abdominal pain, arthralgias, change in bowel habit, chest pain, chills, fever, headaches, myalgias, nausea, numbness or urinary symptoms. The symptoms are aggravated by walking. She has tried position changes, relaxation, rest and acetaminophen for the symptoms. The treatment provided moderate relief.     Review of systems:   ROS negative except were noted in HPI.    Code Status: full code    /70   Pulse 72   Temp 36.4 °C (97.6 °F)   Resp 18   Ht 1.6 m (5' 3\")   Wt 59.4 kg (131 lb)   SpO2 98%   BMI 23.21 kg/m²         Physical Exam  Constitutional:       General: She is not in acute distress.  HENT:      Head: Normocephalic.      Nose: Nose normal.      Mouth/Throat:      Mouth: Mucous membranes are moist.   Eyes:      Extraocular Movements: Extraocular movements intact.      Pupils: Pupils are equal, round, and reactive to light.   Cardiovascular:      Rate and Rhythm: Normal rate and regular rhythm.      Pulses: Normal pulses.   Pulmonary:      Effort: Pulmonary effort is normal.      Breath sounds: Normal breath sounds.   Abdominal:      General: Bowel sounds are normal.      Palpations: Abdomen is soft.      Tenderness: There is no abdominal tenderness. " There is no guarding.   Genitourinary:     Comments: Voiding  Musculoskeletal:         General: Normal range of motion.      Cervical back: Normal range of motion.      Comments: Decreased ROM of L shoulder.  Skin:     General: Skin is warm and dry.      Capillary Refill: Capillary refill takes less than 2 seconds.   Neurological:      General: No focal deficit present.      Mental Status: She is alert and oriented to person, place, and time.   Psychiatric:         Mood and Affect: Mood normal.         Behavior: Behavior normal.     Medications reviewed during visit at facility.  Aspirin 81 mg po daily  Omeprazole 10 mg po daily  Vitamin C 500 mg po daily  Centrum one tablet po daily  Amlodipine 10 mg po daily  Alprazolam 0.25 1/2 tablet po daily  Lasix 20 mg Mon, , and Friday  Atorvastatin 80 mg po daily  Empaglifizon 10 mg po daily  Trifluoperazine HCl Oral Tablet 2 mg po q hs  Losartan 100 mg po daily  Tylenol 650 mg po q 4 hours prn  MOM 30 ml po daily prn  Tramadol 50 mg po q 8 prn.  Glimepiride 4 mg po bid  Metoprolol tartrate 75 mg po bid  Lovasa 1 gram two capsules po daily  Levothyroxine 100 mcg po daily  Miralax 17 grams po daily  Biotin 5 mg po daily  Chlorpromazine HCl Oral Tablet 10 mg po q hs  Folic acid 1 mg po daily  Oxycodone 5 mg 1/2 tablet po q 4 hours prn  Tramadol 50 mg po q 8 hour prn  Metoprolol tartrate 75 mg po bid    Labs reviewed at facility:     Laboratory Service Report [id number]   Patient Name   ESA MCNALLY  Patient ID   [id number]  Age   89 Y  Gender   F  Order #      Ordering DILIP Leonard  Patient Telephone #   [phone]     1929  AKA      Client Order #   [id number]   Collection Date and Time   01/15/2024 09:31   Print Date and Time   2024 16:54  Account Information   [facility] [place]   [address] [place] [address]     Report Notes                  Test Results   Reference Perform  Unit  Value Site*             CBC  and Differential  REPORTED 01/15/2024 12:42  White Blood Cell Count   4.95 k/uL 3.70-11.00    RBC   4.17 m/uL 3.90-5.20    Hemoglobin   12.6 g/dL 11.5-15.5    Hematocrit   37.9 % 36.0-46.0    MCV   90.9 fL 80.0-100.0    MCH   30.2 pg 26.0-34.0    MCHC   33.2 g/dL 30.5-36.0    RDW-CV H 16.0 % 11.5-15.0    Platelet Count   301 k/uL 150-400    MPV   10.6 fL 9.0-12.7    Neut%   60.2 %    Abs Neut   2.98 k/uL 1.45-7.50    Lymph%   24.4 %    Abs Lymph   1.21 k/uL 1.00-4.00    Mono%   8.9 %    Abs Mono   0.44 k/uL <0.87    Eosin%   5.1 %    Abs Eosin   0.25 k/uL <0.46    Baso%   1.0 %    Abs Baso   0.05 k/uL <0.11    Immature Gran %%   0.4 %    Abs Immature Gran   <0.03 k/uL <0.10    NRBC   0.0 /100 WBC    Absolute nRBC   <0.01 k/uL <0.01    Diff Type   Auto               Comp Metabolic Panel  REPORTED 01/15/2024 12:58  Protein, Total   7.0 g/dL 6.3-8.0    Albumin L 3.0 g/dL 3.9-4.9    Calcium, Total   8.9 mg/dL 8.5-10.2    Bilirubin, Total   0.3 mg/dL 0.2-1.3    Alkaline Phosphatase H 165 U/L     AST L 12 U/L 13-35    ALT   11 U/L 7-38    Glucose H 298 mg/dL 74-99  BUN   14 mg/dL 7-21    Creatinine L 0.56 mg/dL 0.58-0.96    Sodium   139 mmol/L 136-144    Potassium   4.2 mmol/L 3.7-5.1    Chloride   105 mmol/L     CO2   23 mmol/L 22-30    Anion Gap   11 mmol/L 9-18    Estimated Glomerular Filtration Rate   85 mL/min/1.73 meters squared >=60   Assessment/Plan   Problem List Items Addressed This Visit       Closed displaced comminuted supracondylar fracture of left humerus without intercondylar fracture with routine healing     Schedule follow up with Kris Andrade MD on 1/22/24         Impaired functional mobility, balance, gait, and endurance - Primary     Review physical and occupational therapy notes. Walker or quad cane for ambulation. Discuss plans to discharge patient to assisted living with social service.         Pain     Tramadol 50 mg po q 8 prn alternate with Tylenol.             Time:    Dudley SANCHEZ  GRISEL Acosta       Electronically Signed By: Dudley Acosta PA-C   1/21/24 11:16 AM

## 2024-01-21 ENCOUNTER — NURSING HOME VISIT (OUTPATIENT)
Dept: POST ACUTE CARE | Facility: EXTERNAL LOCATION | Age: 89
End: 2024-01-21
Payer: MEDICARE

## 2024-01-21 VITALS
HEART RATE: 64 BPM | SYSTOLIC BLOOD PRESSURE: 112 MMHG | BODY MASS INDEX: 23.21 KG/M2 | DIASTOLIC BLOOD PRESSURE: 67 MMHG | WEIGHT: 131 LBS

## 2024-01-21 DIAGNOSIS — E78.2 DM TYPE 2 WITH DIABETIC MIXED HYPERLIPIDEMIA (MULTI): Primary | ICD-10-CM

## 2024-01-21 DIAGNOSIS — E11.69 DM TYPE 2 WITH DIABETIC MIXED HYPERLIPIDEMIA (MULTI): Primary | ICD-10-CM

## 2024-01-21 DIAGNOSIS — N18.30 STAGE 3 CHRONIC KIDNEY DISEASE, UNSPECIFIED WHETHER STAGE 3A OR 3B CKD (MULTI): ICD-10-CM

## 2024-01-21 DIAGNOSIS — Z86.73 OLD CEREBROVASCULAR ACCIDENT (CVA) WITHOUT LATE EFFECT: ICD-10-CM

## 2024-01-21 DIAGNOSIS — S42.422D: ICD-10-CM

## 2024-01-21 DIAGNOSIS — F41.8 DEPRESSION WITH ANXIETY: ICD-10-CM

## 2024-01-21 PROCEDURE — 99308 SBSQ NF CARE LOW MDM 20: CPT | Performed by: INTERNAL MEDICINE

## 2024-01-21 ASSESSMENT — ENCOUNTER SYMPTOMS
ABDOMINAL PAIN: 0
BACK PAIN: 0
CHILLS: 0
HEADACHES: 0
CONSTIPATION: 0
APNEA: 0
COUGH: 0
ARTHRALGIAS: 0
ARTHRALGIAS: 1
EXTREMITY WEAKNESS: 1
SHORTNESS OF BREATH: 0
CHOKING: 0
DIARRHEA: 0
ABDOMINAL PAIN: 0
FATIGUE: 0
WEAKNESS: 1
NAUSEA: 0
MYALGIAS: 0
NUMBNESS: 0
ABDOMINAL DISTENTION: 0
ACTIVITY CHANGE: 0
FEVER: 0
CHANGE IN BOWEL HABIT: 0

## 2024-01-21 NOTE — PROGRESS NOTES
Subjective   Patient ID: Toshia Méndez is a 89 y.o. female who is acute skilled care being seen and evaluated for multiple medical problems.    Patient did well, LFTs are normalized, going back to the radiographs and orthopedic evaluation last fall did not cause any more displacement of the fracture.  She has lost some weight, she remains calm and comfortable, she is not attempting to do any hazardous movements or activities that can make her fall, she remains on antipsychotics, she remains mildly hemiparetic, I told them that since LFTs are normalized she does not have to see a gastroenterologist.  Physical therapy progress was reviewed and patient is anxious to know what will be her disposition and I told her that we still need some time and sessions of physical therapy in order to make her more sustainable so she can go up to at least assisted living facility.         Review of Systems   Constitutional:  Negative for activity change and fatigue.   Respiratory:  Negative for apnea, cough, choking and shortness of breath.    Cardiovascular:  Negative for chest pain.   Gastrointestinal:  Negative for abdominal distention, abdominal pain, constipation and diarrhea.   Musculoskeletal:  Positive for arthralgias and gait problem. Negative for back pain.   Skin: Negative.    Neurological:  Positive for weakness.       Objective   /67   Pulse 64   Wt 59.4 kg (131 lb)   BMI 23.21 kg/m²     Physical Exam  Vitals reviewed.   Constitutional:       Appearance: Normal appearance. She is normal weight.   HENT:      Head: Normocephalic.   Eyes:      Conjunctiva/sclera: Conjunctivae normal.   Cardiovascular:      Rate and Rhythm: Normal rate and regular rhythm.   Pulmonary:      Effort: Pulmonary effort is normal.      Breath sounds: Normal breath sounds.   Abdominal:      Palpations: Abdomen is soft.   Musculoskeletal:         General: Tenderness and deformity present. No swelling.      Cervical back: Neck supple.    Skin:     General: Skin is warm and dry.   Neurological:      General: No focal deficit present.   Psychiatric:         Mood and Affect: Mood normal.         Assessment/Plan   Problem List Items Addressed This Visit             ICD-10-CM    Stage 3 chronic kidney disease, unspecified whether stage 3a or 3b CKD (CMS/[facility]) N18.30    Depression with anxiety F41.8    DM type 2 with diabetic mixed hyperlipidemia (CMS/[facility]) - Primary E11.69, E78.2    Old cerebrovascular accident (CVA) without late effect Z86.73    Closed displaced comminuted supracondylar fracture of left humerus without intercondylar fracture with routine healing S42.422D   .  Laboratories will be done and followed, blood sugars remains fluctuating, only thing she is on is SGLT2 inhibitor and glimepiride, there is no sling at the shoulder, there is no increasing deformity noticed, late effects of CVA persist, her mood and affect are appropriate, chronic kidney disease persist, no major events or concerns, continue medications with periodic lab assessment and follow-up and we hope that patient will go back to assisted living facility or some independent setting of confinement.  If needed would communicate with patient's family.     Goals    None

## 2024-01-21 NOTE — ASSESSMENT & PLAN NOTE
Review physical and occupational therapy notes. Walker or quad cane for ambulation. Discuss plans to discharge patient to assisted living with social service.

## 2024-01-21 NOTE — LETTER
Patient: Toshia Méndez  : 1929    Encounter Date: 2024    Subjective  Patient ID: Toshia Méndez is a 89 y.o. female who is acute skilled care being seen and evaluated for multiple medical problems.    Patient did well, LFTs are normalized, going back to the radiographs and orthopedic evaluation last fall did not cause any more displacement of the fracture.  She has lost some weight, she remains calm and comfortable, she is not attempting to do any hazardous movements or activities that can make her fall, she remains on antipsychotics, she remains mildly hemiparetic, I told them that since LFTs are normalized she does not have to see a gastroenterologist.  Physical therapy progress was reviewed and patient is anxious to know what will be her disposition and I told her that we still need some time and sessions of physical therapy in order to make her more sustainable so she can go up to at least assisted living facility.         Review of Systems   Constitutional:  Negative for activity change and fatigue.   Respiratory:  Negative for apnea, cough, choking and shortness of breath.    Cardiovascular:  Negative for chest pain.   Gastrointestinal:  Negative for abdominal distention, abdominal pain, constipation and diarrhea.   Musculoskeletal:  Positive for arthralgias and gait problem. Negative for back pain.   Skin: Negative.    Neurological:  Positive for weakness.       Objective  /67   Pulse 64   Wt 59.4 kg (131 lb)   BMI 23.21 kg/m²     Physical Exam  Vitals reviewed.   Constitutional:       Appearance: Normal appearance. She is normal weight.   HENT:      Head: Normocephalic.   Eyes:      Conjunctiva/sclera: Conjunctivae normal.   Cardiovascular:      Rate and Rhythm: Normal rate and regular rhythm.   Pulmonary:      Effort: Pulmonary effort is normal.      Breath sounds: Normal breath sounds.   Abdominal:      Palpations: Abdomen is soft.   Musculoskeletal:         General:  Tenderness and deformity present. No swelling.      Cervical back: Neck supple.   Skin:     General: Skin is warm and dry.   Neurological:      General: No focal deficit present.   Psychiatric:         Mood and Affect: Mood normal.         Assessment/Plan  Problem List Items Addressed This Visit             ICD-10-CM    Stage 3 chronic kidney disease, unspecified whether stage 3a or 3b CKD (CMS/[facility]) N18.30    Depression with anxiety F41.8    DM type 2 with diabetic mixed hyperlipidemia (CMS/[facility]) - Primary E11.69, E78.2    Old cerebrovascular accident (CVA) without late effect Z86.73    Closed displaced comminuted supracondylar fracture of left humerus without intercondylar fracture with routine healing S42.422D   .  Laboratories will be done and followed, blood sugars remains fluctuating, only thing she is on is SGLT2 inhibitor and glimepiride, there is no sling at the shoulder, there is no increasing deformity noticed, late effects of CVA persist, her mood and affect are appropriate, chronic kidney disease persist, no major events or concerns, continue medications with periodic lab assessment and follow-up and we hope that patient will go back to assisted living facility or some independent setting of confinement.  If needed would communicate with patient's family.     Goals    None           Electronically Signed By: Jamal Villagran MD   1/21/24  3:46 PM

## 2024-01-22 ENCOUNTER — TELEPHONE (OUTPATIENT)
Dept: GASTROENTEROLOGY | Facility: CLINIC | Age: 89
End: 2024-01-22
Payer: MEDICARE

## 2024-01-22 NOTE — TELEPHONE ENCOUNTER
Returning patient's phone call in regards to cancelling appointment on 01/29/24 with Dr. Benitez. VM left for patient/patient daughter to return call to office at earliest convenience.

## 2024-01-27 NOTE — DISCHARGE SUMMARY
HOSPITAL COURSE AND DETAILS INCLUDING HPI/PHYSICAL EXAM /AND ADMISSION INFO  Toshia Méndez is a 89 y.o. female presenting with 89-year-old female with a history of CAD status post CABG.  She has multiple other medical problems.  Patient has been having some dizziness when she gets up and moves around.  She comes in from a local nursing facility to the emergency department with a complaint of headache, left shoulder and hip pain status post fall.  Patient's reports she was sitting in a wheelchair when she decided to get up she fell to the ground and she is lost her balance since then.    HENT:      Head: Normocephalic.      Nose: Nose normal.      Mouth/Throat:      Mouth: Mucous membranes are moist.   Cardiovascular:      Rate and Rhythm: Normal rate.   Pulmonary:      Effort: Pulmonary effort is normal.   Abdominal:      General: Abdomen is flat.   Musculoskeletal:         General: Swelling, tenderness and deformity present.      Cervical back: Normal range of motion.   Skin:     General: Skin is warm.   Neurological:      General: No focal deficit present.      Mental Status: She is alert.   Psychiatric:         Mood and Affect: Mood normal.     Other closed nondisplaced fracture of proximal end of left humerus, initial encounter  Fall  Headache  Wheezing  Type 2 diabetes  Hypercalcemia  Elevated alk phos  Urinary retention     Ortho neuro consult  DuoNebs  Respiratory evaluation  Check liver functions  GI and urology consult  PT OT  See if there is any surgical issues required    Patient seen today continue to be followed in the hospital.  She was also found to have a nondisplaced fracture of the proximal end of the left humerus.  UTI was treated after adjustment of her medications and review she was discharged to a skilled nursing facility        DISCHARGE DIAGNOSIS          Problem List Items Addressed This Visit             ICD-10-CM    Closed displaced comminuted supracondylar fracture of left humerus  without intercondylar fracture with routine healing S42.422D    Relevant Medications    oxyCODONE (Roxicodone) 5 mg immediate release tablet    Closed fracture of proximal end of left humerus with malunion, unspecified fracture morphology, subsequent encounter S42.202P    Relevant Medications    oxyCODONE (Roxicodone) 5 mg immediate release tablet    * (Principal) Other closed nondisplaced fracture of proximal end of left humerus, initial encounter - Primary S42.295A     Other Visit Diagnoses         Codes    Fall, initial encounter     W19.XXXA    Urinary tract infection without hematuria, site unspecified     N39.0            Malnutrition Diagnosis Status: New  Malnutrition Diagnosis: Moderate malnutrition related to acute disease or injury  As Evidenced by: 8.7% unintentional wt loss x <2 months and pt w/moderate muscle wasting.  I agree with the dietitian's malnutrition diagnosis.                           Last Recorded Vitals:  Vitals:    01/11/24 1600 01/11/24 2000 01/12/24 0000 01/12/24 0800   BP: 116/56 136/65 128/60 138/63   BP Location: Right arm Right arm Right arm Right arm   Patient Position: Lying Lying Lying Lying   Pulse: 82 92 82 79   Resp: 19 17 20 19   Temp: 36.9 °C (98.4 °F) 36.5 °C (97.7 °F) 36 °C (96.8 °F) 36.6 °C (97.9 °F)   TempSrc: Temporal Temporal  Temporal   SpO2: 94% 96% 96% 95%   Weight:       Height:                 DISCHARGE MEDICATIONS       Your medication list        START taking these medications        Instructions Last Dose Given Next Dose Due   fluconazole 100 mg tablet  Commonly known as: Diflucan      Take 1 tablet (100 mg) by mouth once daily for 3 doses.       oxyCODONE 5 mg immediate release tablet  Commonly known as: Roxicodone      Take 0.5 tablets (2.5 mg) by mouth every 4 hours if needed for severe pain (7 - 10).              CONTINUE taking these medications        Instructions Last Dose Given Next Dose Due   ALPRAZolam 0.25 mg tablet  Commonly known as: Xanax            amLODIPine 10 mg tablet  Commonly known as: Norvasc           ascorbic acid 500 mg tablet  Commonly known as: Vitamin C           aspirin 81 mg EC tablet           biotin 5 mg capsule           chlorproMAZINE 10 mg tablet  Commonly known as: Thorazine           empagliflozin 10 mg  Commonly known as: Jardiance           folic acid 1 mg tablet  Commonly known as: Folvite           furosemide 20 mg tablet  Commonly known as: Lasix           glimepiride 4 mg tablet  Commonly known as: Amaryl           levothyroxine 100 mcg tablet  Commonly known as: Synthroid, Levoxyl           losartan 100 mg tablet  Commonly known as: Cozaar           metoprolol tartrate 75 mg tablet  Commonly known as: Lopressor      Take 1 tablet (75 mg) by mouth 2 times a day.       multivitamin with minerals tablet           omega-3 acid ethyl esters 1 gram capsule  Commonly known as: Lovaza           omeprazole 10 mg DR capsule  Commonly known as: PriLOSEC           polyethylene glycol 17 gram packet  Commonly known as: Glycolax, Miralax      Take 17 g by mouth once daily as needed (constipation).       traMADol 50 mg tablet  Commonly known as: Ultram           trifluoperazine 2 mg tablet  Commonly known as: Stelazine                  STOP taking these medications      acetaminophen 325 mg tablet  Commonly known as: Tylenol                  Where to Get Your Medications        These medications were sent to [facility]/pharmacy #[id number] - [address] AT [address] [address]      Phone: [phone]   fluconazole 100 mg tablet  oxyCODONE 5 mg immediate release tablet           OUTPATIENT FOLLOW-UP    Future Appointments   Date Time Provider Department Center   5/2/2024  1:00 PM Jessica Condon MD [id number]       Patient has been given the appropriate discharge instructions discharge medications and follow-up with appropriate physicians.  For all of the pertinent data  including  labs- consults-imaging please see the chart.

## 2024-01-28 ENCOUNTER — NURSING HOME VISIT (OUTPATIENT)
Dept: POST ACUTE CARE | Facility: EXTERNAL LOCATION | Age: 89
End: 2024-01-28
Payer: MEDICARE

## 2024-01-28 DIAGNOSIS — E11.69 DM TYPE 2 WITH DIABETIC MIXED HYPERLIPIDEMIA (MULTI): Primary | ICD-10-CM

## 2024-01-28 DIAGNOSIS — R05.2 SUBACUTE COUGH: ICD-10-CM

## 2024-01-28 DIAGNOSIS — E78.2 DM TYPE 2 WITH DIABETIC MIXED HYPERLIPIDEMIA (MULTI): Primary | ICD-10-CM

## 2024-01-28 DIAGNOSIS — S42.422D: ICD-10-CM

## 2024-01-28 DIAGNOSIS — N18.30 STAGE 3 CHRONIC KIDNEY DISEASE, UNSPECIFIED WHETHER STAGE 3A OR 3B CKD (MULTI): ICD-10-CM

## 2024-01-28 DIAGNOSIS — Z86.73 OLD CEREBROVASCULAR ACCIDENT (CVA) WITHOUT LATE EFFECT: ICD-10-CM

## 2024-01-28 PROCEDURE — 99308 SBSQ NF CARE LOW MDM 20: CPT | Performed by: INTERNAL MEDICINE

## 2024-01-28 NOTE — LETTER
Patient: Toshia Méndez  : 1929    Encounter Date: 2024    Subjective  Patient ID: Toshia Méndez is a 89 y.o. female who is acute skilled care being seen and evaluated for multiple medical problems.    This patient is a daily to be discharged and then nursing staff is telling me that she has been coughing, she has been having some sputum and some wheezing, patient was barely doing well after having fracture of humerus after which she fell and required another hospitalization.  Patient also has abnormal LFTs from which she has recovered completely.  Today when I see this patient she is sitting upright she is having some congestion COVID-19 smears has been negative.  She remains on antipsychotic medications and her blood sugars Fluctuating.  Otherwise Her Functional Status and General Condition Has Improved and Fortunately Fracture Was Not Worse after Having Second Fall and It Did Not Require Surgery.  Sling Has Been Taken off.         Review of Systems   Constitutional:  Negative for activity change and fatigue.   HENT:  Positive for congestion.    Respiratory:  Positive for cough and wheezing. Negative for apnea and shortness of breath.    Cardiovascular: Negative.    Gastrointestinal:  Negative for abdominal distention, blood in stool, constipation and nausea.   Musculoskeletal:  Positive for arthralgias and gait problem.   Neurological:  Negative for weakness.   Psychiatric/Behavioral:  The patient is not nervous/anxious.        Objective  There were no vitals taken for this visit.    Physical Exam  Vitals reviewed.   Constitutional:       Appearance: Normal appearance. She is normal weight.   HENT:      Head: Normocephalic.   Eyes:      Conjunctiva/sclera: Conjunctivae normal.   Cardiovascular:      Rate and Rhythm: Normal rate and regular rhythm.   Pulmonary:      Effort: Pulmonary effort is normal.      Breath sounds: Wheezing and rhonchi present.   Abdominal:      Palpations: Abdomen is  soft.   Musculoskeletal:         General: Tenderness present.      Cervical back: Neck supple.   Skin:     General: Skin is warm and dry.   Neurological:      General: No focal deficit present.   Psychiatric:         Mood and Affect: Mood normal.         Assessment/Plan  Problem List Items Addressed This Visit             ICD-10-CM    Stage 3 chronic kidney disease, unspecified whether stage 3a or 3b CKD (CMS/[facility]) N18.30    DM type 2 with diabetic mixed hyperlipidemia (CMS/[facility]) - Primary E11.69, E78.2    Old cerebrovascular accident (CVA) without late effect Z86.73    Closed displaced comminuted supracondylar fracture of left humerus without intercondylar fracture with routine healing S42.422D     Other Visit Diagnoses         Codes    Subacute cough     R05.2        She has a CVA from last year with the late effect of mild hemiparesis, obtain chest radiograph, if needed antibacterials can be considered, pulmonary infiltrates may not be ruled out from the portable x-ray which has been done here in the facility.  Patient can be discharged home unless she is showing any signs of hypoxemia or any febrile state, she is feeling well, chronic kidney disease persist, she was advised to follow-up with her primary care.  LFTs were normalized, fracture is going to be healed with some displacement.  Hopefully cough will subside if condition worsens please inform us otherwise patient can be discharged as planned.     Goals    None           Electronically Signed By: Jamal Villagran MD   1/29/24  8:53 PM

## 2024-01-29 ENCOUNTER — APPOINTMENT (OUTPATIENT)
Dept: GASTROENTEROLOGY | Facility: CLINIC | Age: 89
End: 2024-01-29
Payer: MEDICARE

## 2024-01-29 VITALS — HEART RATE: 78 BPM | SYSTOLIC BLOOD PRESSURE: 113 MMHG | DIASTOLIC BLOOD PRESSURE: 78 MMHG

## 2024-01-29 ASSESSMENT — ENCOUNTER SYMPTOMS
ARTHRALGIAS: 1
APNEA: 0
FATIGUE: 0
ABDOMINAL DISTENTION: 0
NAUSEA: 0
NERVOUS/ANXIOUS: 0
CONSTIPATION: 0
SHORTNESS OF BREATH: 0
WHEEZING: 1
WEAKNESS: 0
BLOOD IN STOOL: 0
CARDIOVASCULAR NEGATIVE: 1
COUGH: 1
ACTIVITY CHANGE: 0

## 2024-01-30 NOTE — PROGRESS NOTES
Subjective   Patient ID: Toshia Méndez is a 89 y.o. female who is acute skilled care being seen and evaluated for multiple medical problems.    This patient is a daily to be discharged and then nursing staff is telling me that she has been coughing, she has been having some sputum and some wheezing, patient was barely doing well after having fracture of humerus after which she fell and required another hospitalization.  Patient also has abnormal LFTs from which she has recovered completely.  Today when I see this patient she is sitting upright she is having some congestion COVID-19 smears has been negative.  She remains on antipsychotic medications and her blood sugars Fluctuating.  Otherwise Her Functional Status and General Condition Has Improved and Fortunately Fracture Was Not Worse after Having Second Fall and It Did Not Require Surgery.  Sling Has Been Taken off.         Review of Systems   Constitutional:  Negative for activity change and fatigue.   HENT:  Positive for congestion.    Respiratory:  Positive for cough and wheezing. Negative for apnea and shortness of breath.    Cardiovascular: Negative.    Gastrointestinal:  Negative for abdominal distention, blood in stool, constipation and nausea.   Musculoskeletal:  Positive for arthralgias and gait problem.   Neurological:  Negative for weakness.   Psychiatric/Behavioral:  The patient is not nervous/anxious.        Objective   There were no vitals taken for this visit.    Physical Exam  Vitals reviewed.   Constitutional:       Appearance: Normal appearance. She is normal weight.   HENT:      Head: Normocephalic.   Eyes:      Conjunctiva/sclera: Conjunctivae normal.   Cardiovascular:      Rate and Rhythm: Normal rate and regular rhythm.   Pulmonary:      Effort: Pulmonary effort is normal.      Breath sounds: Wheezing and rhonchi present.   Abdominal:      Palpations: Abdomen is soft.   Musculoskeletal:         General: Tenderness present.      Cervical  back: Neck supple.   Skin:     General: Skin is warm and dry.   Neurological:      General: No focal deficit present.   Psychiatric:         Mood and Affect: Mood normal.         Assessment/Plan   Problem List Items Addressed This Visit             ICD-10-CM    Stage 3 chronic kidney disease, unspecified whether stage 3a or 3b CKD (CMS/[facility]) N18.30    DM type 2 with diabetic mixed hyperlipidemia (CMS/[facility]) - Primary E11.69, E78.2    Old cerebrovascular accident (CVA) without late effect Z86.73    Closed displaced comminuted supracondylar fracture of left humerus without intercondylar fracture with routine healing S42.422D     Other Visit Diagnoses         Codes    Subacute cough     R05.2        She has a CVA from last year with the late effect of mild hemiparesis, obtain chest radiograph, if needed antibacterials can be considered, pulmonary infiltrates may not be ruled out from the portable x-ray which has been done here in the facility.  Patient can be discharged home unless she is showing any signs of hypoxemia or any febrile state, she is feeling well, chronic kidney disease persist, she was advised to follow-up with her primary care.  LFTs were normalized, fracture is going to be healed with some displacement.  Hopefully cough will subside if condition worsens please inform us otherwise patient can be discharged as planned.     Goals    None

## 2024-04-19 RX ORDER — LOSARTAN POTASSIUM 100 MG/1
100 TABLET ORAL DAILY
Qty: 14 TABLET | OUTPATIENT
Start: 2024-04-19

## 2024-06-12 ENCOUNTER — APPOINTMENT (OUTPATIENT)
Dept: NEUROLOGY | Facility: CLINIC | Age: 89
End: 2024-06-12
Payer: MEDICARE

## 2024-06-12 VITALS
SYSTOLIC BLOOD PRESSURE: 120 MMHG | DIASTOLIC BLOOD PRESSURE: 62 MMHG | HEART RATE: 67 BPM | WEIGHT: 134 LBS | BODY MASS INDEX: 26.31 KG/M2 | HEIGHT: 60 IN | TEMPERATURE: 97.3 F

## 2024-06-12 DIAGNOSIS — M48.062 SPINAL STENOSIS, LUMBAR REGION, WITH NEUROGENIC CLAUDICATION: ICD-10-CM

## 2024-06-12 DIAGNOSIS — R29.898 LEFT ARM WEAKNESS: ICD-10-CM

## 2024-06-12 DIAGNOSIS — Z86.73 OLD CEREBROVASCULAR ACCIDENT (CVA) WITHOUT LATE EFFECT: Primary | ICD-10-CM

## 2024-06-12 PROCEDURE — 3078F DIAST BP <80 MM HG: CPT | Performed by: STUDENT IN AN ORGANIZED HEALTH CARE EDUCATION/TRAINING PROGRAM

## 2024-06-12 PROCEDURE — 99213 OFFICE O/P EST LOW 20 MIN: CPT | Performed by: STUDENT IN AN ORGANIZED HEALTH CARE EDUCATION/TRAINING PROGRAM

## 2024-06-12 PROCEDURE — 1159F MED LIST DOCD IN RCRD: CPT | Performed by: STUDENT IN AN ORGANIZED HEALTH CARE EDUCATION/TRAINING PROGRAM

## 2024-06-12 PROCEDURE — 1036F TOBACCO NON-USER: CPT | Performed by: STUDENT IN AN ORGANIZED HEALTH CARE EDUCATION/TRAINING PROGRAM

## 2024-06-12 PROCEDURE — 1123F ACP DISCUSS/DSCN MKR DOCD: CPT | Performed by: STUDENT IN AN ORGANIZED HEALTH CARE EDUCATION/TRAINING PROGRAM

## 2024-06-12 PROCEDURE — 1160F RVW MEDS BY RX/DR IN RCRD: CPT | Performed by: STUDENT IN AN ORGANIZED HEALTH CARE EDUCATION/TRAINING PROGRAM

## 2024-06-12 PROCEDURE — 3074F SYST BP LT 130 MM HG: CPT | Performed by: STUDENT IN AN ORGANIZED HEALTH CARE EDUCATION/TRAINING PROGRAM

## 2024-06-12 ASSESSMENT — LIFESTYLE VARIABLES
SKIP TO QUESTIONS 9-10: 1
HOW OFTEN DO YOU HAVE SIX OR MORE DRINKS ON ONE OCCASION: NEVER
HOW OFTEN DO YOU HAVE A DRINK CONTAINING ALCOHOL: NEVER
HOW MANY STANDARD DRINKS CONTAINING ALCOHOL DO YOU HAVE ON A TYPICAL DAY: PATIENT DOES NOT DRINK
AUDIT-C TOTAL SCORE: 0

## 2024-06-12 ASSESSMENT — ENCOUNTER SYMPTOMS
OCCASIONAL FEELINGS OF UNSTEADINESS: 1
LOSS OF SENSATION IN FEET: 0
DEPRESSION: 0

## 2024-06-12 NOTE — LETTER
June 12, 2024     Jessica Condon MD  [id number]  [facility] [address]    Patient: Toshia Méndez   YOB: 1929   Date of Visit: 6/12/2024       Dear Dr. Jessica Condon MD:    Thank you for referring Toshia Méndez to me for evaluation. Below are my notes for this consultation.  If you have questions, please do not hesitate to call me. I look forward to following your patient along with you.       Sincerely,     Jessica Condon MD      CC: No Recipients  ______________________________________________________________________________________     Vascular Neurology FUV:    HPI: Toshia Méndez is a 89 y.o. female who presents for routine stroke follow up.    5/7/2023 presented to [facility] with sudden onset dizziness and vision changes. Received IV TNK. Found to have R PCA infarct. She then had 2 repeat hospitalizations 5/26 and 6/6 for which she experienced symptoms of dizziness, leg weakness but repeat imaging showed stable CT findings, MRI brain had no new infarct with stable subacute R PCA infarct without any cerebellar inVolvement. She was discharged to SNF on aspirin 81mg and atorvastatin 80mg daily.     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 Cox), T2DM, HTN, HLD, DVT, CKD  Prior hemorrhage of kidney and hx of GI ulcer     8/3/2023 - stroke prevention clinic. She reports being very active and is now home with 24-7 assistance by family members. She can walk and has a riding chair for her steps. She is independent in her ADLs. She needs help with cooking. She has occasional dizziness, worse first thing in the morning. She sits on the bed for 5-10 minutes upon awakening. Family planning to purchase a life alert button. She had her ppm interrogated a few days ago by her cardiologist and there was no atrial fibrillation.    6/12/24: reviewed MRI that showed no additional styles besides R PCA. MRA shows decreased flow in R PCA but  otherwise appears WNL. She is not taking her statin due to elevated liver enzymes. She is taking the aspirin. No new stroke symptoms since she was seen last. She has no concerns today.    ROS: Denies chest pain, SOB, abdominal pain today.     PMH:   Past Medical History:   Diagnosis Date   • Abnormal weight loss     Weight loss   • Anxiety disorder, unspecified     Anxiety   • Cardiomegaly     LVH (left ventricular hypertrophy)   • Difficulty in walking, not elsewhere classified     Difficulty walking   • Disorder of arteries and arterioles, unspecified (CMS-HCC)     Carotid artery disease   • Essential (primary) hypertension 11/07/2013    Benign essential hypertension   • Hyperlipidemia, unspecified 11/07/2013    Hyperlipidemia   • Other symptoms and signs involving the musculoskeletal system     Limb weakness   • Personal history of other diseases of the circulatory system     Personal history of coronary atherosclerosis   • Personal history of other diseases of the digestive system     History of gastroesophageal reflux (GERD)   • Personal history of other diseases of the nervous system and sense organs     History of diplopia   • Personal history of other diseases of the respiratory system     History of chronic bronchitis   • Personal history of other endocrine, nutritional and metabolic disease     History of diabetes mellitus   • Personal history of other mental and behavioral disorders     History of depression   • Personal history of other specified conditions     History of diarrhea   • Personal history of other venous thrombosis and embolism     History of deep venous thrombosis   • Stress incontinence (female) (male)     Stress incontinence        PSH:   Past Surgical History:   Procedure Laterality Date   • CARDIAC SURGERY  11/19/2014    Heart Surgery   • CHOLECYSTECTOMY  11/19/2014    Cholecystectomy   • COLONOSCOPY  11/19/2014    Colonoscopy (Fiberoptic)   • CORONARY ARTERY BYPASS GRAFT  02/11/2014     CABG   • DILATION AND CURETTAGE OF UTERUS  02/11/2014    Dilation And Curettage   • LUMBAR LAMINECTOMY  03/04/2015    Laminectomy Lumbar   • MR HEAD ANGIO WO IV CONTRAST  2/18/2020    MR HEAD ANGIO WO IV CONTRAST 2/18/2020 [facility]   • MR NECK ANGIO WO IV CONTRAST  2/18/2020    MR NECK ANGIO WO IV CONTRAST 2/18/2020 [facility]   • OTHER SURGICAL HISTORY  10/26/2021    Bronchoscopy   • OTHER SURGICAL HISTORY  10/26/2021    Arterial stent placement   • OTHER SURGICAL HISTORY  10/26/2021    Back surgery   • OTHER SURGICAL HISTORY  10/26/2021    Cataract surgery   • OTHER SURGICAL HISTORY  10/26/2021    Coronary artery bypass graft   • OTHER SURGICAL HISTORY  10/26/2021    Pacemaker insertion   • OTHER SURGICAL HISTORY  10/26/2021    Esophagogastroduodenoscopy   • OTHER SURGICAL HISTORY  02/11/2014    Previous Stent Placement   • TONSILLECTOMY  11/19/2014    Tonsillectomy        Allergies:   Allergies   Allergen Reactions   • Iodinated Contrast Media Shortness of breath   • Iodine Swelling   • Sulfa (Sulfonamide Antibiotics) Hives and Rash   • Sulfamethoxazole-Trimethoprim Hives and Rash        FH: No family history on file.     SH: Lives in [place] with two daughters. Feels safe at home. Smoking: None. Alcohol use: None. Illicit drug use: None. Occupation: Scrub tech prior to retiring.    Objective:    Visit Vitals  /62 (BP Location: Right arm, Patient Position: Sitting, BP Cuff Size: Adult)   Pulse 67   Temp 36.3 °C (97.3 °F) (Temporal)          Current Outpatient Medications:   •  ALPRAZolam (Xanax) 0.25 mg tablet, Take 0.5 tablets (0.125 mg) by mouth 2 times a day., Disp: , Rfl:   •  amLODIPine (Norvasc) 10 mg tablet, Take 1 tablet (10 mg) by mouth once daily., Disp: , Rfl:   •  ascorbic acid (Vitamin C) 500 mg tablet, Take 1 tablet (500 mg) by mouth once daily., Disp: , Rfl:   •  aspirin 81 mg EC tablet, Take 1 tablet (81 mg) by mouth once daily., Disp: , Rfl:   •  biotin 5 mg  capsule, Take 1 capsule (5 mg) by mouth once daily., Disp: , Rfl:   •  chlorproMAZINE (Thorazine) 10 mg tablet, Take 1 tablet (10 mg) by mouth once daily at bedtime., Disp: , Rfl:   •  empagliflozin (Jardiance) 10 mg, Take 1 tablet (10 mg) by mouth once daily., Disp: , Rfl:   •  folic acid (Folvite) 1 mg tablet, Take 1 tablet (1 mg) by mouth once daily., Disp: , Rfl:   •  furosemide (Lasix) 20 mg tablet, Take 1 tablet (20 mg) by mouth once a day on Monday, Wednesday, and Friday., Disp: , Rfl:   •  glimepiride (Amaryl) 4 mg tablet, Take 1 tablet (4 mg) by mouth 2 times a day., Disp: , Rfl:   •  levothyroxine (Synthroid, Levoxyl) 100 mcg tablet, Take 1 tablet (100 mcg) by mouth once daily in the morning. Take before meals., Disp: , Rfl:   •  losartan (Cozaar) 100 mg tablet, Take 1 tablet (100 mg) by mouth once daily., Disp: , Rfl:   •  metoprolol tartrate (Lopressor) 75 mg tablet, Take 1 tablet (75 mg) by mouth 2 times a day., Disp: , Rfl:   •  multivitamin with minerals tablet, Take 1 tablet by mouth once daily., Disp: , Rfl:   •  omega-3 acid ethyl esters (Lovaza) 1 gram capsule, Take 2 capsules (2 g) by mouth once daily., Disp: , Rfl:   •  omeprazole (PriLOSEC) 10 mg DR capsule, Take 1 capsule (10 mg) by mouth once daily in the morning. Take before meals., Disp: , Rfl:   •  oxyCODONE (Roxicodone) 5 mg immediate release tablet, Take 0.5 tablets (2.5 mg) by mouth every 4 hours if needed for severe pain (7 - 10)., Disp: 12 tablet, Rfl: 0  •  polyethylene glycol (Glycolax, Miralax) 17 gram packet, Take 17 g by mouth once daily as needed (constipation)., Disp: , Rfl:   •  traMADol (Ultram) 50 mg tablet, Take 1 tablet (50 mg) by mouth every 8 hours if needed for moderate pain (4 - 6)., Disp: , Rfl:   •  trifluoperazine (Stelazine) 2 mg tablet, Take 1 tablet (2 mg) by mouth once daily at bedtime., Disp: , Rfl:        Extensive review of notes in EMR, labs, tests, Interpretation of neuroimaging  MRI head results: No MRI  head results found for the past 12 months, CT head results: CT head wo IV contrast    Result Date: 1/8/2024  Interpreted By:  Buck Anna, STUDY: CT HEAD WO IV CONTRAST; CT CERVICAL SPINE WO IV CONTRAST;  1/8/2024 6:51 pm   INDICATION: Trauma. Signs/Symptoms:closed head injury.   COMPARISON: November 23, 2023 CT head and cervical spine examinations. October 31, 2020 chest CT   ACCESSION NUMBER(S): [id number]; [id number]   ORDERING CLINICIAN: MARCO NJ   TECHNIQUE: Axial noncontrast head and cervical spine CT   FINDINGS: Head:   Stable gliosis right parietooccipital region and right periventricular region most likely related to remote infarcts. The ventricles, sulci, and cisterns are mildly prominent compatible with atrophy which is stable. There are stable nonspecific low-density white matter changes which are most likely chronic microvascular changes. No detected acute intra, or extra-axial fluid collection, acute mass or mass effect. There is normal gray-white differentiation. No detected calvarial fracture. The paranasal sinuses and mastoid air cells are clear.   Cervical spine:   Alignment: Stable alignment including 2 mm C4 and C5 anterolisthesis.   Cranial cervical junction: Stable appearance.   Fracture: No detected acute fracture.   Vertebra and intervertebral disc spaces: Stable multilevel mild-to-moderate spondylosis and degenerative disc changes.   Paravertebral soft tissues: Stable appearance without evident hematoma. Mild scattered arterial vascular calcifications. Stable curvilinear bandlike thickening at the right lung apex compatible with postinflammatory scar with punctate calcifications.   Brain Injury (BIG) guidelines CT values:   Skull fracture: No SDH (subdural hematoma): None detected EDH (epidural hemtoma): None detected IPH (intraparenchymal hemorrhage): None detected SAH (subarachnoid hemorrhage): None detected IVH (intraventricular hemorrhage): No   Reference: Dakota LAINEZ  David RS, Hermes M, et al. The BIG (brain injury guidelines) project: defining the management of traumatic brain injury by acute care surgeons. J Trauma Acute Care Surg. 2014;76:191u035.       No acute intracranial abnormality was identified.   No detected fracture.   Stable pre-existing findings as reported.   MACRO: None     Signed by: Buck Anna 1/8/2024 7:05 PM Dictation workstation:   [id number]    CT head wo IV contrast    Result Date: 11/23/2023  Interpreted By:  Angelo Kiser, STUDY: CT HEAD WO IV CONTRAST;  11/23/2023 10:28 am   INDICATION: fall to R head with L shoulder pain.   COMPARISON: CT scan dated 06/11/2023   ACCESSION NUMBER(S): [id number]   ORDERING CLINICIAN: ELENA QUEZADA   TECHNIQUE: Noncontrast axial CT scan of head was performed. Angled reformats in brain and bone windows were generated. The images were reviewed in bone, brain, blood and soft tissue windows.   FINDINGS: CSF Spaces: The ventricles, sulci and basal cisterns are within normal limits. There is no extraaxial fluid collection.   Parenchyma: Right posterior parieto-occipital CSF density area likely related to prior infarct. Bilateral periventricular white matter changes related to chronic microvascular ischemia. The grey-white differentiation is intact. There is no mass effect or midline shift. There is no intracranial hemorrhage.   Calvarium: The calvarium is unremarkable.   Paranasal sinuses and mastoids: Visualized paranasal sinuses and mastoids are clear.       1. No acute intracranial hemorrhage or serious brain herniation. No depressed skull fracture. 2. 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   MACRO: None   Signed by: Angelo Kiser 11/23/2023 10:35 AM Dictation workstation:   [id number] , and Brain vessel imaging results: No brain vessel imaging results found for the past 12 months      Neurologic Exam:    Mental Status: Oriented to person, place, and time. Language is fluent. Naming intact to low and high frequency  objects. Repetition intact. Follows complex, cross body commands.   Cranial Nerves:   II: Pupils equal and reactive without evidence of APD.   III/IV/VI: EOMI without nystagmus.   V: Facial sensation intact and symmetric. Strong muscles of mastication.   VII: Face symmetric without evidence of facial droop.   VIII: Hearing intact bilaterally to finger rub.  IX: Palate elevates symmetrically. Uvula midline.  XII: Tongue protrudes midline   Motor: L arm is broken and she cannot lift it. Strength 4+ throughout other extremities.  Sensation: Sensation is intact and symmetric to light touch in bilateral upper and lower extremities.   Reflexes: Deferred  Coordination: No evidence of ataxia in upper extremity on R. Cannot test L.  Gait: Walks with assistive device. In a wheelchair here for ease.     Assessment/Plan:  Toshia Méndez is a 89 year old female who presents for follow up of a R PCA stroke. Her MRA showed R PCA narrowing and cutoff without any disease of other vessels. Cardiac workup unremarkable. She was taken off of her statin due to elevated liver enzymes; will not try another statin due to age (no major benefit) and risk of liver injury. She will continue aspirin and follow up as needed.     Continue aspirin 81mg  2.   Follow up PRN    Patient was seen and discussed with Dr. Condon.     Xiao Clay DO  Vascular Neurology Fellow PGY5  [facility]

## 2024-06-12 NOTE — PROGRESS NOTES
Vascular Neurology FUV:    HPI: Toshia Méndez is a 89 y.o. female who presents for routine stroke follow up.    5/7/2023 presented to [facility] with sudden onset dizziness and vision changes. Received IV TNK. Found to have R PCA infarct. She then had 2 repeat hospitalizations 5/26 and 6/6 for which she experienced symptoms of dizziness, leg weakness but repeat imaging showed stable CT findings, MRI brain had no new infarct with stable subacute R PCA infarct without any cerebellar inVolvement. She was discharged to SNF on aspirin 81mg and atorvastatin 80mg daily.     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 Cox), T2DM, HTN, HLD, DVT, CKD  Prior hemorrhage of kidney and hx of GI ulcer     8/3/2023 - stroke prevention clinic. She reports being very active and is now home with 24-7 assistance by family members. She can walk and has a riding chair for her steps. She is independent in her ADLs. She needs help with cooking. She has occasional dizziness, worse first thing in the morning. She sits on the bed for 5-10 minutes upon awakening. Family planning to purchase a life alert button. She had her ppm interrogated a few days ago by her cardiologist and there was no atrial fibrillation.    6/12/24: reviewed MRI that showed no additional styles besides R PCA. MRA shows decreased flow in R PCA but otherwise appears WNL. She is not taking her statin due to elevated liver enzymes. She is taking the aspirin. No new stroke symptoms since she was seen last. She has no concerns today.    ROS: Denies chest pain, SOB, abdominal pain today.     PMH:   Past Medical History:   Diagnosis Date    Abnormal weight loss     Weight loss    Anxiety disorder, unspecified     Anxiety    Cardiomegaly     LVH (left ventricular hypertrophy)    Difficulty in walking, not elsewhere classified     Difficulty walking    Disorder of arteries and arterioles, unspecified (CMS-HCC)     Carotid artery disease    Essential  (primary) hypertension 11/07/2013    Benign essential hypertension    Hyperlipidemia, unspecified 11/07/2013    Hyperlipidemia    Other symptoms and signs involving the musculoskeletal system     Limb weakness    Personal history of other diseases of the circulatory system     Personal history of coronary atherosclerosis    Personal history of other diseases of the digestive system     History of gastroesophageal reflux (GERD)    Personal history of other diseases of the nervous system and sense organs     History of diplopia    Personal history of other diseases of the respiratory system     History of chronic bronchitis    Personal history of other endocrine, nutritional and metabolic disease     History of diabetes mellitus    Personal history of other mental and behavioral disorders     History of depression    Personal history of other specified conditions     History of diarrhea    Personal history of other venous thrombosis and embolism     History of deep venous thrombosis    Stress incontinence (female) (male)     Stress incontinence        PSH:   Past Surgical History:   Procedure Laterality Date    CARDIAC SURGERY  11/19/2014    Heart Surgery    CHOLECYSTECTOMY  11/19/2014    Cholecystectomy    COLONOSCOPY  11/19/2014    Colonoscopy (Fiberoptic)    CORONARY ARTERY BYPASS GRAFT  02/11/2014    CABG    DILATION AND CURETTAGE OF UTERUS  02/11/2014    Dilation And Curettage    LUMBAR LAMINECTOMY  03/04/2015    Laminectomy Lumbar    MR HEAD ANGIO WO IV CONTRAST  2/18/2020    MR HEAD ANGIO WO IV CONTRAST 2/18/2020 [facility]    MR NECK ANGIO WO IV CONTRAST  2/18/2020    MR NECK ANGIO WO IV CONTRAST 2/18/2020 [facility]    OTHER SURGICAL HISTORY  10/26/2021    Bronchoscopy    OTHER SURGICAL HISTORY  10/26/2021    Arterial stent placement    OTHER SURGICAL HISTORY  10/26/2021    Back surgery    OTHER SURGICAL HISTORY  10/26/2021    Cataract surgery    OTHER SURGICAL HISTORY  10/26/2021     Coronary artery bypass graft    OTHER SURGICAL HISTORY  10/26/2021    Pacemaker insertion    OTHER SURGICAL HISTORY  10/26/2021    Esophagogastroduodenoscopy    OTHER SURGICAL HISTORY  02/11/2014    Previous Stent Placement    TONSILLECTOMY  11/19/2014    Tonsillectomy        Allergies:   Allergies   Allergen Reactions    Iodinated Contrast Media Shortness of breath    Iodine Swelling    Sulfa (Sulfonamide Antibiotics) Hives and Rash    Sulfamethoxazole-Trimethoprim Hives and Rash        FH: No family history on file.     SH: Lives in [place] with two daughters. Feels safe at home. Smoking: None. Alcohol use: None. Illicit drug use: None. Occupation: Scrub tech prior to retiring.    Objective:    Visit Vitals  /62 (BP Location: Right arm, Patient Position: Sitting, BP Cuff Size: Adult)   Pulse 67   Temp 36.3 °C (97.3 °F) (Temporal)          Current Outpatient Medications:     ALPRAZolam (Xanax) 0.25 mg tablet, Take 0.5 tablets (0.125 mg) by mouth 2 times a day., Disp: , Rfl:     amLODIPine (Norvasc) 10 mg tablet, Take 1 tablet (10 mg) by mouth once daily., Disp: , Rfl:     ascorbic acid (Vitamin C) 500 mg tablet, Take 1 tablet (500 mg) by mouth once daily., Disp: , Rfl:     aspirin 81 mg EC tablet, Take 1 tablet (81 mg) by mouth once daily., Disp: , Rfl:     biotin 5 mg capsule, Take 1 capsule (5 mg) by mouth once daily., Disp: , Rfl:     chlorproMAZINE (Thorazine) 10 mg tablet, Take 1 tablet (10 mg) by mouth once daily at bedtime., Disp: , Rfl:     empagliflozin (Jardiance) 10 mg, Take 1 tablet (10 mg) by mouth once daily., Disp: , Rfl:     folic acid (Folvite) 1 mg tablet, Take 1 tablet (1 mg) by mouth once daily., Disp: , Rfl:     furosemide (Lasix) 20 mg tablet, Take 1 tablet (20 mg) by mouth once a day on Monday, Wednesday, and Friday., Disp: , Rfl:     glimepiride (Amaryl) 4 mg tablet, Take 1 tablet (4 mg) by mouth 2 times a day., Disp: , Rfl:     levothyroxine (Synthroid, Levoxyl) 100 mcg tablet,  Take 1 tablet (100 mcg) by mouth once daily in the morning. Take before meals., Disp: , Rfl:     losartan (Cozaar) 100 mg tablet, Take 1 tablet (100 mg) by mouth once daily., Disp: , Rfl:     metoprolol tartrate (Lopressor) 75 mg tablet, Take 1 tablet (75 mg) by mouth 2 times a day., Disp: , Rfl:     multivitamin with minerals tablet, Take 1 tablet by mouth once daily., Disp: , Rfl:     omega-3 acid ethyl esters (Lovaza) 1 gram capsule, Take 2 capsules (2 g) by mouth once daily., Disp: , Rfl:     omeprazole (PriLOSEC) 10 mg DR capsule, Take 1 capsule (10 mg) by mouth once daily in the morning. Take before meals., Disp: , Rfl:     oxyCODONE (Roxicodone) 5 mg immediate release tablet, Take 0.5 tablets (2.5 mg) by mouth every 4 hours if needed for severe pain (7 - 10)., Disp: 12 tablet, Rfl: 0    polyethylene glycol (Glycolax, Miralax) 17 gram packet, Take 17 g by mouth once daily as needed (constipation)., Disp: , Rfl:     traMADol (Ultram) 50 mg tablet, Take 1 tablet (50 mg) by mouth every 8 hours if needed for moderate pain (4 - 6)., Disp: , Rfl:     trifluoperazine (Stelazine) 2 mg tablet, Take 1 tablet (2 mg) by mouth once daily at bedtime., Disp: , Rfl:        Extensive review of notes in EMR, labs, tests, Interpretation of neuroimaging  MRI head results: No MRI head results found for the past 12 months, CT head results: CT head wo IV contrast    Result Date: 1/8/2024  Interpreted By:  Buck Anna, STUDY: CT HEAD WO IV CONTRAST; CT CERVICAL SPINE WO IV CONTRAST;  1/8/2024 6:51 pm   INDICATION: Trauma. Signs/Symptoms:closed head injury.   COMPARISON: November 23, 2023 CT head and cervical spine examinations. October 31, 2020 chest CT   ACCESSION NUMBER(S): [id number]; [id number]   ORDERING CLINICIAN: MARCO NJ   TECHNIQUE: Axial noncontrast head and cervical spine CT   FINDINGS: Head:   Stable gliosis right parietooccipital region and right periventricular region most likely related to remote  infarcts. The ventricles, sulci, and cisterns are mildly prominent compatible with atrophy which is stable. There are stable nonspecific low-density white matter changes which are most likely chronic microvascular changes. No detected acute intra, or extra-axial fluid collection, acute mass or mass effect. There is normal gray-white differentiation. No detected calvarial fracture. The paranasal sinuses and mastoid air cells are clear.   Cervical spine:   Alignment: Stable alignment including 2 mm C4 and C5 anterolisthesis.   Cranial cervical junction: Stable appearance.   Fracture: No detected acute fracture.   Vertebra and intervertebral disc spaces: Stable multilevel mild-to-moderate spondylosis and degenerative disc changes.   Paravertebral soft tissues: Stable appearance without evident hematoma. Mild scattered arterial vascular calcifications. Stable curvilinear bandlike thickening at the right lung apex compatible with postinflammatory scar with punctate calcifications.   Brain Injury (BIG) guidelines CT values:   Skull fracture: No SDH (subdural hematoma): None detected EDH (epidural hemtoma): None detected IPH (intraparenchymal hemorrhage): None detected SAH (subarachnoid hemorrhage): None detected IVH (intraventricular hemorrhage): No   Reference: Dakota LAINEZ, David RS, Hermes M, et al. The BIG (brain injury guidelines) project: defining the management of traumatic brain injury by acute care surgeons. J Trauma Acute Care Surg. 2014;76:390d466.       No acute intracranial abnormality was identified.   No detected fracture.   Stable pre-existing findings as reported.   MACRO: None     Signed by: Buck Anna 1/8/2024 7:05 PM Dictation workstation:   [id number]    CT head wo IV contrast    Result Date: 11/23/2023  Interpreted By:  Angelo Kiser, STUDY: CT HEAD WO IV CONTRAST;  11/23/2023 10:28 am   INDICATION: fall to R head with L shoulder pain.   COMPARISON: CT scan dated 06/11/2023   ACCESSION NUMBER(S):  [id number]   ORDERING CLINICIAN: ELENA QUEZADA   TECHNIQUE: Noncontrast axial CT scan of head was performed. Angled reformats in brain and bone windows were generated. The images were reviewed in bone, brain, blood and soft tissue windows.   FINDINGS: CSF Spaces: The ventricles, sulci and basal cisterns are within normal limits. There is no extraaxial fluid collection.   Parenchyma: Right posterior parieto-occipital CSF density area likely related to prior infarct. Bilateral periventricular white matter changes related to chronic microvascular ischemia. The grey-white differentiation is intact. There is no mass effect or midline shift. There is no intracranial hemorrhage.   Calvarium: The calvarium is unremarkable.   Paranasal sinuses and mastoids: Visualized paranasal sinuses and mastoids are clear.       1. No acute intracranial hemorrhage or serious brain herniation. No depressed skull fracture. 2. 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   MACRO: None   Signed by: Angelo Kiser 11/23/2023 10:35 AM Dictation workstation:   [id number] , and Brain vessel imaging results: No brain vessel imaging results found for the past 12 months      Neurologic Exam:    Mental Status: Oriented to person, place, and time. Language is fluent. Naming intact to low and high frequency objects. Repetition intact. Follows complex, cross body commands.   Cranial Nerves:   II: Pupils equal and reactive without evidence of APD.   III/IV/VI: EOMI without nystagmus.   V: Facial sensation intact and symmetric. Strong muscles of mastication.   VII: Face symmetric without evidence of facial droop.   VIII: Hearing intact bilaterally to finger rub.  IX: Palate elevates symmetrically. Uvula midline.  XII: Tongue protrudes midline   Motor: L arm is broken and she cannot lift it. Strength 4+ throughout other extremities.  Sensation: Sensation is intact and symmetric to light touch in bilateral upper and lower extremities.   Reflexes:  Deferred  Coordination: No evidence of ataxia in upper extremity on R. Cannot test L.  Gait: Walks with assistive device. In a wheelchair here for ease.     Assessment/Plan:  Toshia Méndez is a 89 year old female who presents for follow up of a R PCA stroke. Her MRA showed R PCA narrowing and cutoff without any disease of other vessels. Cardiac workup unremarkable. She was taken off of her statin due to elevated liver enzymes; will not try another statin due to age (no major benefit) and risk of liver injury. She will continue aspirin and follow up as needed.     Continue aspirin 81mg  2.   Follow up PRN    Patient was seen and discussed with Dr. Condon.     Xiao Clay DO  Vascular Neurology Fellow PGY5  [facility]

## 2024-07-22 ENCOUNTER — LAB REQUISITION (OUTPATIENT)
Dept: LAB | Facility: HOSPITAL | Age: 89
End: 2024-07-22
Payer: MEDICARE

## 2024-07-22 DIAGNOSIS — N39.0 URINARY TRACT INFECTION, SITE NOT SPECIFIED: ICD-10-CM

## 2024-07-22 PROCEDURE — 87186 SC STD MICRODIL/AGAR DIL: CPT

## 2024-07-22 PROCEDURE — 87086 URINE CULTURE/COLONY COUNT: CPT

## 2024-07-25 LAB — BACTERIA UR CULT: ABNORMAL

## 2024-11-15 ENCOUNTER — OFFICE VISIT (OUTPATIENT)
Dept: URGENT CARE | Age: 89
End: 2024-11-15
Payer: MEDICARE

## 2024-11-15 VITALS
HEART RATE: 67 BPM | SYSTOLIC BLOOD PRESSURE: 168 MMHG | DIASTOLIC BLOOD PRESSURE: 71 MMHG | RESPIRATION RATE: 20 BRPM | WEIGHT: 140 LBS | BODY MASS INDEX: 24.8 KG/M2 | HEIGHT: 63 IN | TEMPERATURE: 98.1 F | OXYGEN SATURATION: 97 %

## 2024-11-15 DIAGNOSIS — N30.01 ACUTE CYSTITIS WITH HEMATURIA: Primary | ICD-10-CM

## 2024-11-15 DIAGNOSIS — R30.0 BURNING WITH URINATION: ICD-10-CM

## 2024-11-15 LAB
POC APPEARANCE, URINE: CLEAR
POC BILIRUBIN, URINE: NEGATIVE
POC BLOOD, URINE: ABNORMAL
POC COLOR, URINE: YELLOW
POC GLUCOSE, URINE: ABNORMAL MG/DL
POC KETONES, URINE: NEGATIVE MG/DL
POC LEUKOCYTES, URINE: ABNORMAL
POC NITRITE,URINE: NEGATIVE
POC PH, URINE: 6 PH
POC PROTEIN, URINE: NEGATIVE MG/DL
POC SPECIFIC GRAVITY, URINE: 1.01
POC UROBILINOGEN, URINE: 0.2 EU/DL

## 2024-11-15 PROCEDURE — 87086 URINE CULTURE/COLONY COUNT: CPT

## 2024-11-15 RX ORDER — CEPHALEXIN 500 MG/1
500 CAPSULE ORAL 4 TIMES DAILY
Qty: 21 CAPSULE | Refills: 0 | Status: SHIPPED | OUTPATIENT
Start: 2024-11-15 | End: 2024-11-25

## 2024-11-15 NOTE — PATIENT INSTRUCTIONS
"You were seen for urinary symptoms.    Urine culture: Pending.  You will be notified if your antibiotic needs to be changed based on sensitivity results.    UTI    Plan:  1. Discharge home.  2. Take all medication as directed: Antibiotic.     3. Drink plenty of liquids such as sugar free cranberry juice  4. Follow up with your PCP in 2-3 days to discuss urine culture results and for reevaluation.    If your urine culture results are negative for bacterial growth, is contaminated, if you are still having symptoms despite treatment, or if your symptoms are lasting longer than expected; I advise follow up with your PCP to discuss test results and to have a reevaluation in order to rule out any other causes of your symptoms.    5. Return to the UC or ED immediately if new or worsening symptoms develop    Some approaches to preventing urinary tract infections are:  -Drinking more fluids  -Drinking cranberry juice  -Avoid bubble baths  -Encouraging \"females\" to wipe with toilet paper from front to back.    Go to the emergency department if you have any new or worsening symptoms.  -Worsening nausea and vomiting  -The inability to urinate  -Vaginal discharge, irritation or lesions  -Fever that lasts more than 2 days while taking an antibiotic  -Rash or swelling following an antibiotic  -Worsening back pain, fever, chills  "

## 2024-11-15 NOTE — PROGRESS NOTES
Subjective   Patient ID: Toshia Méndez is a 89 y.o. female. They present today with a chief complaint of No chief complaint on file..    CC: UTI symptoms    HPI: Patient presenting for concerns of  a UTI.  Symptoms include fatigue, urinary frequency, urgency, and dysuria.  Accompanied by her daughter who help provide part of the history.    No abdominal pain, nausea, vomiting, diarrhea, rash.  No back or flank pain.  No concern for STD.  No  skin irritation, discharge or other lesions.    Past Medical History  Allergies as of 11/15/2024 - Reviewed 11/15/2024   Allergen Reaction Noted    Iodinated contrast media Shortness of breath 11/23/2023    Iodine Swelling 06/27/2017    Sulfa (sulfonamide antibiotics) Hives and Rash 06/27/2017    Sulfamethoxazole-trimethoprim Hives and Rash 02/24/2020       (Not in a hospital admission)         Past Medical History:   Diagnosis Date    Abnormal weight loss     Weight loss    Anxiety disorder, unspecified     Anxiety    Cardiomegaly     LVH (left ventricular hypertrophy)    Difficulty in walking, not elsewhere classified     Difficulty walking    Disorder of arteries and arterioles, unspecified     Carotid artery disease    Essential (primary) hypertension 11/07/2013    Benign essential hypertension    Hyperlipidemia, unspecified 11/07/2013    Hyperlipidemia    Other symptoms and signs involving the musculoskeletal system     Limb weakness    Personal history of other diseases of the circulatory system     Personal history of coronary atherosclerosis    Personal history of other diseases of the digestive system     History of gastroesophageal reflux (GERD)    Personal history of other diseases of the nervous system and sense organs     History of diplopia    Personal history of other diseases of the respiratory system     History of chronic bronchitis    Personal history of other endocrine, nutritional and metabolic disease     History of diabetes mellitus    Personal  history of other mental and behavioral disorders     History of depression    Personal history of other specified conditions     History of diarrhea    Personal history of other venous thrombosis and embolism     History of deep venous thrombosis    Stress incontinence (female) (male)     Stress incontinence       Past Surgical History:   Procedure Laterality Date    CARDIAC SURGERY  11/19/2014    Heart Surgery    CHOLECYSTECTOMY  11/19/2014    Cholecystectomy    COLONOSCOPY  11/19/2014    Colonoscopy (Fiberoptic)    CORONARY ARTERY BYPASS GRAFT  02/11/2014    CABG    DILATION AND CURETTAGE OF UTERUS  02/11/2014    Dilation And Curettage    LUMBAR LAMINECTOMY  03/04/2015    Laminectomy Lumbar    MR HEAD ANGIO WO IV CONTRAST  2/18/2020    MR HEAD ANGIO WO IV CONTRAST 2/18/2020 [facility]    MR NECK ANGIO WO IV CONTRAST  2/18/2020    MR NECK ANGIO WO IV CONTRAST 2/18/2020 [facility]    OTHER SURGICAL HISTORY  10/26/2021    Bronchoscopy    OTHER SURGICAL HISTORY  10/26/2021    Arterial stent placement    OTHER SURGICAL HISTORY  10/26/2021    Back surgery    OTHER SURGICAL HISTORY  10/26/2021    Cataract surgery    OTHER SURGICAL HISTORY  10/26/2021    Coronary artery bypass graft    OTHER SURGICAL HISTORY  10/26/2021    Pacemaker insertion    OTHER SURGICAL HISTORY  10/26/2021    Esophagogastroduodenoscopy    OTHER SURGICAL HISTORY  02/11/2014    Previous Stent Placement    TONSILLECTOMY  11/19/2014    Tonsillectomy        reports that she has never smoked. She has never used smokeless tobacco. She reports that she does not currently use alcohol. She reports that she does not currently use drugs.    Review of Systems  Review of Systems      After reviewing all body systems I have documented pertinent findings above in the history.  All other Systems reviewed and are negative for complaint.  Pertinent positive and negatives are listed in the above HPI.      Objective    Vitals:    11/15/24 1035   BP:  "168/71   BP Location: Right arm   Patient Position: Sitting   BP Cuff Size: Adult   Pulse: 67   Resp: 20   Temp: 36.7 °C (98.1 °F)   TempSrc: Oral   SpO2: 97%   Weight: 63.5 kg (140 lb)   Height: 1.6 m (5' 3\")     No LMP recorded. Patient is postmenopausal.    Physical Exam    General: Alert, oriented, and cooperative.  No acute distress. Well developed, well nourished.     Skin: Skin is warm, and dry. No rashes or lesions.    Eyes: Sclera and conjunctivae normal     Neck: Supple.     Cardiac: Regular rate and rhythm    Respiratory:  No acute respiratory distress.  Regular rate of breathing.  No accessory muscle use.  No tripoding.      Abdomen: Soft, nontender.  No CVA tenderness.    Procedures    Point of Care Test & Imaging Results from this visit    No results found.    Diagnostic study results (if any) were reviewed by Efrain Pickard PA-C.    Assessment/Plan   Allergies, medications, history, and pertinent labs/EKGs/Imaging reviewed by Efrain Pickard PA-C.       MDM:  Patient presenting for UTI type symptoms.    Reports no concerns for STD.  No reported nausea or vomiting. Denied flank and back pain. Denies  lesions, vaginal discharge, rash, or tenderness.     Patient overall looks well.  Does not appear systemically ill or toxic.  No abdominal tenderness, guarding or rebound. No CVA tenderness.     Urine: Dip: Suspicious for UTI  Urine culture: Pending      No clinical findings to suggest Pyelonephritis or Urinary Stone.    Low concerns for STD, PID, HSV, or irritant causes.     Advised follow-up with PCP for reevaluation. Pt/family instructed to return if symptoms worsen or if new symptoms develop. Patient/family expressed understanding and consented to the above plan. No barriers of communication were apparent and I answered all questions.    Orders and Diagnoses  Diagnoses and all orders for this visit:  Acute cystitis with hematuria  -     cephalexin (Keflex) 500 mg capsule; Take 1 capsule (500 mg) " by mouth 4 times a day for 10 days.  Burning with urination  -     POCT UA Automated manually resulted  -     Urine Culture  -     cephalexin (Keflex) 500 mg capsule; Take 1 capsule (500 mg) by mouth 4 times a day for 10 days.    Patient disposition: Home    Electronically signed by Efrain Pickard PA-C  12:13 PM

## 2024-11-17 LAB — BACTERIA UR CULT: ABNORMAL

## 2024-11-18 LAB — BACTERIA UR CULT: ABNORMAL

## 2024-11-25 ENCOUNTER — NURSING HOME VISIT (OUTPATIENT)
Dept: POST ACUTE CARE | Facility: EXTERNAL LOCATION | Age: 89
End: 2024-11-25
Payer: MEDICARE

## 2024-11-25 DIAGNOSIS — N39.0 URINARY TRACT INFECTION WITHOUT HEMATURIA, SITE UNSPECIFIED: ICD-10-CM

## 2024-11-25 DIAGNOSIS — E11.9 HYPERTENSION COMPLICATING DIABETES: ICD-10-CM

## 2024-11-25 DIAGNOSIS — K59.00 CONSTIPATION, UNSPECIFIED CONSTIPATION TYPE: ICD-10-CM

## 2024-11-25 DIAGNOSIS — I10 HYPERTENSION COMPLICATING DIABETES: ICD-10-CM

## 2024-11-25 DIAGNOSIS — G51.0 BELL'S PALSY: Primary | ICD-10-CM

## 2024-11-25 DIAGNOSIS — R53.1 WEAKNESS: ICD-10-CM

## 2024-11-25 PROCEDURE — 99304 1ST NF CARE SF/LOW MDM 25: CPT | Performed by: STUDENT IN AN ORGANIZED HEALTH CARE EDUCATION/TRAINING PROGRAM

## 2025-02-12 NOTE — PROGRESS NOTES
Date of Service: 11/25/24      HPI/Subjective  Toshia Méndez is a 89 y.o. female  With past medical history of diabetes, hypertension, hypothyroidism, anxiety.    She was recently admitted to the hospital from 11 20-11 21 for strokelike symptoms which included left-sided facial droop, left upper extremity and left lower extremity weakness.  Patient was found to have likely Bell's palsy and was treated with Medrol Dosepak.  Coincidently was also found to have UTI and was started on oral Keflex.  She is currently admitted to [facility] for skilled rehabilitation.  Currently patient is complaining of intermittent constipation and is agreeable to try Senokot twice daily.  Continue to work with speech therapy for evaluation and treatment of Bell's palsy.  Left-sided facial droop, left upper extremity and left lower extremity weakness are still present but improved from hospitalization.  Working with PT OT.  No other complaints or concerns at this time.    Review of systems is negative unless mentioned above    Other Medical, Surgical, Family, Social Hx, Allergies per chart in PCC.   Medication list reviewed. Please see PCC.     Objective:   Physical Exam     Vital signs reviewed. Please see chart in PCC.     General: NAD. NCAT.  Alert, awake  HEENT: Extraocular movements intact.  MMM. Nares patent bl.  Cardiovascular: RRR. S1/S2 wnl.   Respiratory: CTABL. No acute respiratory distress.   GI: Soft, NT abdomen. BS present x 4.   MSK: Left upper extremity, lower extremity weakness.   Extremities: No major edema.   Skin: No visible rashes or bruises.   Neuro: Left upper, lower extremity weakness.  Left-sided facial droop.  No other acute focal deficits noted.  Psych: Mood wnl.          REVIEW OF SYSTEMS   ROS reviewed within HPI and is otherwise negative       Assessment and Plan  Encounter Diagnoses   Name Primary?    Bell's palsy Yes    Weakness     Hypertension complicating diabetes     Constipation,  unspecified constipation type     Urinary tract infection without hematuria, site unspecified        -Continue SLP for bells palsy  -Finish steroid course  -Finish course of keflex for UTI  -Continue home medications for HTN  -Monitor BG levels. Continue jardiance. Low carb diet recommended.   -Senakot S BID for constipation.   -Pre-Admission hospital notes reviewed  -Pertinent radiology, if any, reviewed   -Current rehab plan reviewed; continue pending any major changes  -Current medication therapy reviewed. Continue and monitor for adverse effects.  -Monitor vitals  -Monitor labs  -Continue home medications for chronic medical conditions.   -PT/OT as tolerated  -Low carb, Low sodium, Low fat diet advised         Charting was completed using voice recognition technology and may include unintended errors.    Elena Kowalski MD

## 2025-07-17 ENCOUNTER — APPOINTMENT (OUTPATIENT)
Dept: NEUROLOGY | Facility: CLINIC | Age: OVER 89
End: 2025-07-17
Payer: MEDICARE